# Patient Record
Sex: MALE | Race: BLACK OR AFRICAN AMERICAN | NOT HISPANIC OR LATINO | Employment: FULL TIME | URBAN - METROPOLITAN AREA
[De-identification: names, ages, dates, MRNs, and addresses within clinical notes are randomized per-mention and may not be internally consistent; named-entity substitution may affect disease eponyms.]

---

## 2023-04-14 DIAGNOSIS — D64.9 MILD ANEMIA: Primary | ICD-10-CM

## 2023-04-14 DIAGNOSIS — I10 BENIGN ESSENTIAL HYPERTENSION: ICD-10-CM

## 2023-04-14 PROBLEM — G43.909 MIGRAINES: Status: ACTIVE | Noted: 2023-04-14

## 2023-04-14 PROBLEM — R09.81 CHRONIC NASAL CONGESTION: Status: ACTIVE | Noted: 2023-04-14

## 2023-04-14 PROBLEM — D57.3 SICKLE CELL TRAIT (CMS-HCC): Status: ACTIVE | Noted: 2023-04-14

## 2023-04-14 PROBLEM — J30.2 SEASONAL ALLERGIES: Status: ACTIVE | Noted: 2023-04-14

## 2023-04-14 RX ORDER — FLUTICASONE PROPIONATE 50 MCG
2 SPRAY, SUSPENSION (ML) NASAL DAILY
COMMUNITY
Start: 2021-05-07 | End: 2023-04-17 | Stop reason: SDUPTHER

## 2023-04-14 RX ORDER — CETIRIZINE HYDROCHLORIDE 10 MG/1
1 TABLET ORAL DAILY
COMMUNITY
Start: 2021-06-11 | End: 2023-06-12 | Stop reason: WASHOUT

## 2023-04-14 RX ORDER — AMLODIPINE BESYLATE 10 MG/1
1 TABLET ORAL DAILY
COMMUNITY
Start: 2021-05-07 | End: 2023-04-17 | Stop reason: SDUPTHER

## 2023-04-16 PROBLEM — Z00.00 ANNUAL PHYSICAL EXAM: Status: ACTIVE | Noted: 2023-04-16

## 2023-04-16 NOTE — ASSESSMENT & PLAN NOTE
Start chlorthalidone as directed - Risks, benefits and side effects reviewed with patient.  Check BMP 3-4 weeks  Continue current dose amlodipine  Continue to monitor - call if consistently >140/90  Recheck 1 month

## 2023-04-16 NOTE — ASSESSMENT & PLAN NOTE
Yearly physical done.   Has received both initial COVID vaccines and at least one booster. Bivalent vaccine recommended.  Boostrix recommended   Labs pending

## 2023-04-16 NOTE — PROGRESS NOTES
"Subjective   Patient ID: Dena Rivero is a 22 y.o. male who presents for yearly physical.    HPI  Patient presents today for Annual Physical.    Has been taking BP med as directed - does not check BP at home.    Has had increased anxiety especially when going into public places since the pandemic. Denies depression - denies suicidal or homicidal ideation..     Also would like to start PreP. Is sexually active with male partners. No known diagnosis of HIV - has not had an HIV test in past.    Patient otherwise feels well. No other complaints or concerns.    The patient's relevant past medical, surgical, family, and social history was reviewed in [employer].  All pertinent lab work and results for this visit were reviewed with patient - labs from this AM are pending.      Review of Systems   A complete review of systems was performed and all systems were normal except what is noted in the HPI.        Objective   /83   Pulse 105   Temp 36.6 °C (97.9 °F)   Ht 1.956 m (6' 5\")   Wt (!) 172 kg (379 lb 9.6 oz)   SpO2 96%   BMI 45.01 kg/m²    Physical Exam  Constitutional:       General: He is not in acute distress.     Appearance: Normal appearance. He is obese.   HENT:      Head: Normocephalic and atraumatic.   Cardiovascular:      Rate and Rhythm: Normal rate and regular rhythm.      Heart sounds: Normal heart sounds.   Pulmonary:      Effort: Pulmonary effort is normal.      Breath sounds: Normal breath sounds.   Abdominal:      General: Abdomen is flat. Bowel sounds are normal.      Palpations: Abdomen is soft.      Tenderness: There is no abdominal tenderness.   Musculoskeletal:      Right lower leg: No edema.      Left lower leg: No edema.   Neurological:      Mental Status: He is alert.   Psychiatric:         Mood and Affect: Mood normal.         Behavior: Behavior normal.         Thought Content: Thought content normal.         Health Maintenance Due   Topic Date Due    HIV Screening  Never done    " Varicella Vaccines (2 of 2 - 2-dose childhood series) 01/03/2014    Hepatitis C Screening  Never done    COVID-19 Vaccine (3 - Booster for Moderna series) 07/18/2021    DTaP/Tdap/Td Vaccines (6 - Td or Tdap) 12/22/2021        Assessment/Plan   Problem List Items Addressed This Visit       Benign essential hypertension     Start chlorthalidone as directed - Risks, benefits and side effects reviewed with patient.  Check BMP 3-4 weeks  Continue current dose amlodipine  Continue to monitor - call if consistently >140/90  Recheck 1 month           Relevant Medications    amLODIPine (Norvasc) 10 mg tablet    chlorthalidone (Hygroton) 25 mg tablet    Other Relevant Orders    Basic metabolic panel    Mild anemia     Labs pending         Seasonal allergies    Relevant Medications    fluticasone (Flonase) 50 mcg/actuation nasal spray    Sickle cell trait (CMS/HCC)     No issues         Annual physical exam - Primary     Yearly physical done.   Has received both initial COVID vaccines and at least one booster. Bivalent vaccine recommended.  Boostrix recommended   Labs pending           At risk for HIV due to homosexual contact     HIV, Hep C, Hep B and syphilis testing next labs  Will discuss PreP further at next office visit   Other baseline labs (renal function) are pending  Urine for Chlamydia and gonorrhea today   No prior history of STI         Relevant Orders    HIV 1/2 antibodies, rapid    Hepatitis C Antibody    Hepatitis B surface antigen    C. Trachomatis / N. Gonorrhoeae, Amplified Detection    Hepatitis B surface antibody     Other Visit Diagnoses       Encounter for hepatitis C screening test for low risk patient        Relevant Orders    Hepatitis C Antibody    Generalized anxiety disorder        Relevant Medications    busPIRone (Buspar) 15 mg tablet              Patient understands and agrees with care plan.           Katerine Osborne MD

## 2023-04-17 ENCOUNTER — OFFICE VISIT (OUTPATIENT)
Dept: PRIMARY CARE | Facility: CLINIC | Age: 23
End: 2023-04-17
Payer: COMMERCIAL

## 2023-04-17 ENCOUNTER — LAB (OUTPATIENT)
Dept: LAB | Facility: LAB | Age: 23
End: 2023-04-17
Payer: COMMERCIAL

## 2023-04-17 VITALS
HEIGHT: 77 IN | HEART RATE: 105 BPM | SYSTOLIC BLOOD PRESSURE: 159 MMHG | DIASTOLIC BLOOD PRESSURE: 83 MMHG | BODY MASS INDEX: 37.19 KG/M2 | OXYGEN SATURATION: 96 % | WEIGHT: 315 LBS | TEMPERATURE: 97.9 F

## 2023-04-17 DIAGNOSIS — I10 BENIGN ESSENTIAL HYPERTENSION: ICD-10-CM

## 2023-04-17 DIAGNOSIS — D64.9 MILD ANEMIA: ICD-10-CM

## 2023-04-17 DIAGNOSIS — F41.1 GENERALIZED ANXIETY DISORDER: ICD-10-CM

## 2023-04-17 DIAGNOSIS — J30.2 SEASONAL ALLERGIES: ICD-10-CM

## 2023-04-17 DIAGNOSIS — D57.3 SICKLE CELL TRAIT (CMS-HCC): ICD-10-CM

## 2023-04-17 DIAGNOSIS — Z00.00 ANNUAL PHYSICAL EXAM: Primary | ICD-10-CM

## 2023-04-17 DIAGNOSIS — Z91.89 AT RISK FOR HIV DUE TO HOMOSEXUAL CONTACT: ICD-10-CM

## 2023-04-17 DIAGNOSIS — Z11.59 ENCOUNTER FOR HEPATITIS C SCREENING TEST FOR LOW RISK PATIENT: ICD-10-CM

## 2023-04-17 LAB
ALANINE AMINOTRANSFERASE (SGPT) (U/L) IN SER/PLAS: 23 U/L (ref 10–52)
ALBUMIN (G/DL) IN SER/PLAS: 4.1 G/DL (ref 3.4–5)
ALKALINE PHOSPHATASE (U/L) IN SER/PLAS: 83 U/L (ref 33–120)
ANION GAP IN SER/PLAS: 12 MMOL/L (ref 10–20)
ASPARTATE AMINOTRANSFERASE (SGOT) (U/L) IN SER/PLAS: 20 U/L (ref 9–39)
BILIRUBIN TOTAL (MG/DL) IN SER/PLAS: 0.3 MG/DL (ref 0–1.2)
CALCIUM (MG/DL) IN SER/PLAS: 8.9 MG/DL (ref 8.6–10.3)
CARBON DIOXIDE, TOTAL (MMOL/L) IN SER/PLAS: 28 MMOL/L (ref 21–32)
CHLORIDE (MMOL/L) IN SER/PLAS: 104 MMOL/L (ref 98–107)
CHOLESTEROL (MG/DL) IN SER/PLAS: 172 MG/DL (ref 0–199)
CHOLESTEROL IN HDL (MG/DL) IN SER/PLAS: 44.9 MG/DL
CHOLESTEROL IN LDL (MG/DL) IN SER/PLAS BY DIRECT ASSAY: 128 MG/DL (ref 0–129)
CHOLESTEROL/HDL RATIO: 3.8
COBALAMIN (VITAMIN B12) (PG/ML) IN SER/PLAS: 210 PG/ML (ref 211–911)
CREATININE (MG/DL) IN SER/PLAS: 0.89 MG/DL (ref 0.5–1.3)
ERYTHROCYTE DISTRIBUTION WIDTH (RATIO) BY AUTOMATED COUNT: 12.4 % (ref 11.5–14.5)
ERYTHROCYTE MEAN CORPUSCULAR HEMOGLOBIN CONCENTRATION (G/DL) BY AUTOMATED: 31.4 G/DL (ref 32–36)
ERYTHROCYTE MEAN CORPUSCULAR VOLUME (FL) BY AUTOMATED COUNT: 85 FL (ref 80–100)
ERYTHROCYTES (10*6/UL) IN BLOOD BY AUTOMATED COUNT: 5.37 X10E12/L (ref 4.5–5.9)
FERRITIN (UG/LL) IN SER/PLAS: 55 UG/L (ref 20–300)
FOLATE (NG/ML) IN SER/PLAS: 5.6 NG/ML
GFR MALE: >90 ML/MIN/1.73M2
GLUCOSE (MG/DL) IN SER/PLAS: 95 MG/DL (ref 74–99)
HEMATOCRIT (%) IN BLOOD BY AUTOMATED COUNT: 45.6 % (ref 41–52)
HEMOGLOBIN (G/DL) IN BLOOD: 14.3 G/DL (ref 13.5–17.5)
IRON (UG/DL) IN SER/PLAS: 55 UG/DL (ref 35–150)
IRON BINDING CAPACITY (UG/DL) IN SER/PLAS: 309 UG/DL (ref 240–445)
IRON SATURATION (%) IN SER/PLAS: 18 % (ref 25–45)
LDL: 115 MG/DL (ref 0–119)
LEUKOCYTES (10*3/UL) IN BLOOD BY AUTOMATED COUNT: 7.2 X10E9/L (ref 4.4–11.3)
NON HDL CHOLESTEROL: 127 MG/DL (ref 0–149)
PLATELETS (10*3/UL) IN BLOOD AUTOMATED COUNT: 260 X10E9/L (ref 150–450)
POTASSIUM (MMOL/L) IN SER/PLAS: 4.7 MMOL/L (ref 3.5–5.3)
PROTEIN TOTAL: 7.7 G/DL (ref 6.4–8.2)
SODIUM (MMOL/L) IN SER/PLAS: 139 MMOL/L (ref 136–145)
THYROTROPIN (MIU/L) IN SER/PLAS BY DETECTION LIMIT <= 0.05 MIU/L: 2.2 MIU/L (ref 0.44–3.98)
TRIGLYCERIDE (MG/DL) IN SER/PLAS: 61 MG/DL (ref 0–149)
UREA NITROGEN (MG/DL) IN SER/PLAS: 12 MG/DL (ref 6–23)
VLDL: 12 MG/DL (ref 0–40)

## 2023-04-17 PROCEDURE — 82607 VITAMIN B-12: CPT

## 2023-04-17 PROCEDURE — 1036F TOBACCO NON-USER: CPT | Performed by: FAMILY MEDICINE

## 2023-04-17 PROCEDURE — 85027 COMPLETE CBC AUTOMATED: CPT

## 2023-04-17 PROCEDURE — 82746 ASSAY OF FOLIC ACID SERUM: CPT

## 2023-04-17 PROCEDURE — 99214 OFFICE O/P EST MOD 30 MIN: CPT | Performed by: FAMILY MEDICINE

## 2023-04-17 PROCEDURE — 84443 ASSAY THYROID STIM HORMONE: CPT

## 2023-04-17 PROCEDURE — 87491 CHLMYD TRACH DNA AMP PROBE: CPT

## 2023-04-17 PROCEDURE — 99395 PREV VISIT EST AGE 18-39: CPT | Performed by: FAMILY MEDICINE

## 2023-04-17 PROCEDURE — 87591 N.GONORRHOEAE DNA AMP PROB: CPT

## 2023-04-17 PROCEDURE — 3008F BODY MASS INDEX DOCD: CPT | Performed by: FAMILY MEDICINE

## 2023-04-17 PROCEDURE — 3079F DIAST BP 80-89 MM HG: CPT | Performed by: FAMILY MEDICINE

## 2023-04-17 PROCEDURE — 83540 ASSAY OF IRON: CPT

## 2023-04-17 PROCEDURE — 80053 COMPREHEN METABOLIC PANEL: CPT

## 2023-04-17 PROCEDURE — 80061 LIPID PANEL: CPT

## 2023-04-17 PROCEDURE — 83721 ASSAY OF BLOOD LIPOPROTEIN: CPT

## 2023-04-17 PROCEDURE — 3077F SYST BP >= 140 MM HG: CPT | Performed by: FAMILY MEDICINE

## 2023-04-17 PROCEDURE — 83550 IRON BINDING TEST: CPT

## 2023-04-17 PROCEDURE — 82728 ASSAY OF FERRITIN: CPT

## 2023-04-17 PROCEDURE — 36415 COLL VENOUS BLD VENIPUNCTURE: CPT

## 2023-04-17 RX ORDER — BUSPIRONE HYDROCHLORIDE 15 MG/1
7.5 TABLET ORAL 2 TIMES DAILY
Qty: 30 TABLET | Refills: 11 | Status: SHIPPED | OUTPATIENT
Start: 2023-04-17 | End: 2023-06-12 | Stop reason: SDUPTHER

## 2023-04-17 RX ORDER — FLUTICASONE PROPIONATE 50 MCG
2 SPRAY, SUSPENSION (ML) NASAL DAILY
Qty: 16 G | Refills: 2 | Status: SHIPPED | OUTPATIENT
Start: 2023-04-17

## 2023-04-17 RX ORDER — AMLODIPINE BESYLATE 10 MG/1
10 TABLET ORAL DAILY
Qty: 30 TABLET | Refills: 3 | Status: SHIPPED | OUTPATIENT
Start: 2023-04-17 | End: 2024-02-02 | Stop reason: SDUPTHER

## 2023-04-17 RX ORDER — CHLORTHALIDONE 25 MG/1
25 TABLET ORAL DAILY
Qty: 30 TABLET | Refills: 11 | Status: SHIPPED | OUTPATIENT
Start: 2023-04-17 | End: 2024-02-02 | Stop reason: SDUPTHER

## 2023-04-17 ASSESSMENT — PATIENT HEALTH QUESTIONNAIRE - PHQ9
SUM OF ALL RESPONSES TO PHQ9 QUESTIONS 1 AND 2: 0
1. LITTLE INTEREST OR PLEASURE IN DOING THINGS: NOT AT ALL
2. FEELING DOWN, DEPRESSED OR HOPELESS: NOT AT ALL

## 2023-04-17 NOTE — ASSESSMENT & PLAN NOTE
HIV, Hep C, Hep B and syphilis testing next labs  Will discuss PreP further at next office visit   Other baseline labs (renal function) are pending  Urine for Chlamydia and gonorrhea today   No prior history of STI

## 2023-04-17 NOTE — PATIENT INSTRUCTIONS
I would like you to follow up in 1 months  Please have all labs that were ordered done at least 1 week prior to your visit.    Continue current medication for hypertension. Continue to monitor blood pressure.  Call if blood pressure consistently >140/90.  Low salt diet recommended.  Increase physical activity as able.  Reevaluate in 1 months.

## 2023-04-18 LAB
CHLAMYDIA TRACH., AMPLIFIED: NEGATIVE
N. GONORRHEA, AMPLIFIED: NEGATIVE

## 2023-04-18 RX ORDER — MULTIVITAMIN/IRON/FOLIC ACID 18MG-0.4MG
1 TABLET ORAL DAILY
COMMUNITY
End: 2023-06-12 | Stop reason: WASHOUT

## 2023-04-18 NOTE — TELEPHONE ENCOUNTER
----- Message from Katerine Osborne MD sent at 4/18/2023  9:26 AM EDT -----  Please notify patient B vitamins are low  Recommd OTC B complex please put in chart  Must keep office visit - will give b12 injection at that visit

## 2023-06-02 PROBLEM — E53.8 B12 DEFICIENCY: Status: ACTIVE | Noted: 2023-06-02

## 2023-06-02 PROBLEM — F41.1 GENERALIZED ANXIETY DISORDER: Status: ACTIVE | Noted: 2023-06-02

## 2023-06-05 ENCOUNTER — APPOINTMENT (OUTPATIENT)
Dept: PRIMARY CARE | Facility: CLINIC | Age: 23
End: 2023-06-05

## 2023-06-10 ENCOUNTER — LAB (OUTPATIENT)
Dept: LAB | Facility: LAB | Age: 23
End: 2023-06-10
Payer: COMMERCIAL

## 2023-06-10 DIAGNOSIS — Z11.59 ENCOUNTER FOR HEPATITIS C SCREENING TEST FOR LOW RISK PATIENT: ICD-10-CM

## 2023-06-10 DIAGNOSIS — Z91.89 AT RISK FOR HIV DUE TO HOMOSEXUAL CONTACT: ICD-10-CM

## 2023-06-10 DIAGNOSIS — E53.8 B12 DEFICIENCY: ICD-10-CM

## 2023-06-10 DIAGNOSIS — I10 BENIGN ESSENTIAL HYPERTENSION: ICD-10-CM

## 2023-06-10 LAB
ANION GAP IN SER/PLAS: 15 MMOL/L (ref 10–20)
CALCIUM (MG/DL) IN SER/PLAS: 9.8 MG/DL (ref 8.6–10.3)
CARBON DIOXIDE, TOTAL (MMOL/L) IN SER/PLAS: 24 MMOL/L (ref 21–32)
CHLORIDE (MMOL/L) IN SER/PLAS: 104 MMOL/L (ref 98–107)
COBALAMIN (VITAMIN B12) (PG/ML) IN SER/PLAS: 231 PG/ML (ref 211–911)
CREATININE (MG/DL) IN SER/PLAS: 0.9 MG/DL (ref 0.5–1.3)
GFR MALE: >90 ML/MIN/1.73M2
GLUCOSE (MG/DL) IN SER/PLAS: 83 MG/DL (ref 74–99)
HEPATITIS B VIRUS SURFACE AB (MIU/ML) IN SERUM: <3.1 MIU/ML
HEPATITIS B VIRUS SURFACE AG PRESENCE IN SERUM: NONREACTIVE
HEPATITIS C VIRUS AB PRESENCE IN SERUM: NONREACTIVE
HIV 1/ 2 AG/AB SCREEN: NONREACTIVE
POTASSIUM (MMOL/L) IN SER/PLAS: 4.1 MMOL/L (ref 3.5–5.3)
SODIUM (MMOL/L) IN SER/PLAS: 139 MMOL/L (ref 136–145)
UREA NITROGEN (MG/DL) IN SER/PLAS: 8 MG/DL (ref 6–23)

## 2023-06-10 PROCEDURE — 82607 VITAMIN B-12: CPT

## 2023-06-10 PROCEDURE — 87340 HEPATITIS B SURFACE AG IA: CPT

## 2023-06-10 PROCEDURE — 87389 HIV-1 AG W/HIV-1&-2 AB AG IA: CPT

## 2023-06-10 PROCEDURE — 36415 COLL VENOUS BLD VENIPUNCTURE: CPT

## 2023-06-10 PROCEDURE — 86803 HEPATITIS C AB TEST: CPT

## 2023-06-10 PROCEDURE — 80048 BASIC METABOLIC PNL TOTAL CA: CPT

## 2023-06-10 PROCEDURE — 86706 HEP B SURFACE ANTIBODY: CPT

## 2023-06-12 ENCOUNTER — OFFICE VISIT (OUTPATIENT)
Dept: PRIMARY CARE | Facility: CLINIC | Age: 23
End: 2023-06-12
Payer: COMMERCIAL

## 2023-06-12 VITALS
HEIGHT: 77 IN | BODY MASS INDEX: 37.19 KG/M2 | HEART RATE: 88 BPM | TEMPERATURE: 97.6 F | WEIGHT: 315 LBS | DIASTOLIC BLOOD PRESSURE: 94 MMHG | OXYGEN SATURATION: 95 % | SYSTOLIC BLOOD PRESSURE: 147 MMHG

## 2023-06-12 DIAGNOSIS — Z91.89 AT RISK FOR HIV DUE TO HOMOSEXUAL CONTACT: ICD-10-CM

## 2023-06-12 DIAGNOSIS — J01.10 ACUTE NON-RECURRENT FRONTAL SINUSITIS: ICD-10-CM

## 2023-06-12 DIAGNOSIS — F41.1 GENERALIZED ANXIETY DISORDER: ICD-10-CM

## 2023-06-12 DIAGNOSIS — E53.8 B12 DEFICIENCY: Primary | ICD-10-CM

## 2023-06-12 DIAGNOSIS — I10 BENIGN ESSENTIAL HYPERTENSION: ICD-10-CM

## 2023-06-12 DIAGNOSIS — E66.01 MORBID OBESITY WITH BODY MASS INDEX (BMI) OF 40.0 TO 44.9 IN ADULT (MULTI): ICD-10-CM

## 2023-06-12 PROBLEM — D64.9 MILD ANEMIA: Status: RESOLVED | Noted: 2023-04-14 | Resolved: 2023-06-12

## 2023-06-12 PROCEDURE — 3077F SYST BP >= 140 MM HG: CPT | Performed by: FAMILY MEDICINE

## 2023-06-12 PROCEDURE — 96372 THER/PROPH/DIAG INJ SC/IM: CPT | Performed by: FAMILY MEDICINE

## 2023-06-12 PROCEDURE — 1036F TOBACCO NON-USER: CPT | Performed by: FAMILY MEDICINE

## 2023-06-12 PROCEDURE — 3008F BODY MASS INDEX DOCD: CPT | Performed by: FAMILY MEDICINE

## 2023-06-12 PROCEDURE — 99214 OFFICE O/P EST MOD 30 MIN: CPT | Performed by: FAMILY MEDICINE

## 2023-06-12 PROCEDURE — 3080F DIAST BP >= 90 MM HG: CPT | Performed by: FAMILY MEDICINE

## 2023-06-12 RX ORDER — CYANOCOBALAMIN 1000 UG/ML
1000 INJECTION, SOLUTION INTRAMUSCULAR; SUBCUTANEOUS ONCE
Status: COMPLETED | OUTPATIENT
Start: 2023-06-12 | End: 2023-06-12

## 2023-06-12 RX ORDER — METHYLPREDNISOLONE 4 MG/1
TABLET ORAL
Qty: 21 TABLET | Refills: 0 | Status: SHIPPED | OUTPATIENT
Start: 2023-06-12 | End: 2023-06-19

## 2023-06-12 RX ORDER — METFORMIN HYDROCHLORIDE 500 MG/1
500 TABLET ORAL
Qty: 30 TABLET | Refills: 11 | Status: SHIPPED | OUTPATIENT
Start: 2023-06-12 | End: 2024-02-02 | Stop reason: SDUPTHER

## 2023-06-12 RX ORDER — LANOLIN ALCOHOL/MO/W.PET/CERES
1000 CREAM (GRAM) TOPICAL DAILY
Qty: 30 TABLET | Refills: 11
Start: 2023-06-12 | End: 2023-12-08 | Stop reason: WASHOUT

## 2023-06-12 RX ORDER — LOSARTAN POTASSIUM 50 MG/1
50 TABLET ORAL DAILY
Qty: 30 TABLET | Refills: 11 | Status: SHIPPED | OUTPATIENT
Start: 2023-06-12 | End: 2024-02-02 | Stop reason: DRUGHIGH

## 2023-06-12 RX ORDER — BUSPIRONE HYDROCHLORIDE 15 MG/1
15 TABLET ORAL 2 TIMES DAILY
Qty: 60 TABLET | Refills: 11 | Status: SHIPPED | OUTPATIENT
Start: 2023-06-12 | End: 2024-02-02 | Stop reason: SDUPTHER

## 2023-06-12 RX ORDER — DOXYCYCLINE 100 MG/1
100 TABLET ORAL 2 TIMES DAILY
Qty: 20 TABLET | Refills: 0 | Status: SHIPPED | OUTPATIENT
Start: 2023-06-12 | End: 2023-06-22

## 2023-06-12 RX ADMIN — CYANOCOBALAMIN 1000 MCG: 1000 INJECTION, SOLUTION INTRAMUSCULAR; SUBCUTANEOUS at 12:22

## 2023-06-12 ASSESSMENT — PATIENT HEALTH QUESTIONNAIRE - PHQ9
1. LITTLE INTEREST OR PLEASURE IN DOING THINGS: NOT AT ALL
SUM OF ALL RESPONSES TO PHQ9 QUESTIONS 1 AND 2: 0
2. FEELING DOWN, DEPRESSED OR HOPELESS: NOT AT ALL

## 2023-06-12 NOTE — PROGRESS NOTES
Subjective   Patient ID: Dena Rivero is a 22 y.o. male who presents for Follow-up.      HPI  Patient presents today for follow up labs and chronic conditions.  Anxiety some improved on buspar - still some shakiness. No significant side effects - denies suicidal or homicidal ideation.   Tolerating BP meds no side effects.  Would like to start PreP    Patient otherwise feels well. No other complaints or concerns.    The patient's relevant past medical, surgical, family, and social history was reviewed in [employer].  All pertinent lab work and results for this visit were reviewed with patient.    Recent Results (from the past 1344 hour(s))   C. Trachomatis / N. Gonorrhoeae, Amplified Detection    Collection Time: 04/17/23  3:16 PM   Result Value Ref Range    Neisseria gonorrhea,Amplified NEGATIVE Negative    Chlamydia trachomatis, Amplified NEGATIVE Negative   HIV 1/2 antibodies, rapid    Collection Time: 06/10/23  9:25 AM   Result Value Ref Range    HIV 1 and 2 Screen NONREACTIVE NONREACTIVE   Hepatitis C Antibody    Collection Time: 06/10/23  9:25 AM   Result Value Ref Range    Hepatitis C Ab NONREACTIVE NONREACTIVE   Hepatitis B surface antigen    Collection Time: 06/10/23  9:25 AM   Result Value Ref Range    Hepatitis B Surface Ag NONREACTIVE NONREACTIVE   Hepatitis B surface antibody    Collection Time: 06/10/23  9:25 AM   Result Value Ref Range    Hepatitis B Surface Ab <3.1 <10 mIU/mL   Basic metabolic panel    Collection Time: 06/10/23  9:25 AM   Result Value Ref Range    Glucose 83 74 - 99 mg/dL    Sodium 139 136 - 145 mmol/L    Potassium 4.1 3.5 - 5.3 mmol/L    Chloride 104 98 - 107 mmol/L    Bicarbonate 24 21 - 32 mmol/L    Anion Gap 15 10 - 20 mmol/L    Urea Nitrogen 8 6 - 23 mg/dL    Creatinine 0.90 0.50 - 1.30 mg/dL    GFR MALE >90 >90 mL/min/1.73m2    Calcium 9.8 8.6 - 10.3 mg/dL   Vitamin B12    Collection Time: 06/10/23  9:25 AM   Result Value Ref Range    Vitamin B-12 231 211 - 911 pg/mL  "          Review of Systems   A complete review of systems was performed and all systems were normal except what is noted in the HPI.        Objective   BP (!) 147/94   Pulse 88   Temp 36.4 °C (97.6 °F)   Ht 1.956 m (6' 5\")   Wt (!) 165 kg (364 lb 3.2 oz)   SpO2 95%   BMI 43.19 kg/m²    Physical Exam  Constitutional:       General: He is not in acute distress.     Appearance: Normal appearance. He is obese.   HENT:      Head: Normocephalic and atraumatic.   Cardiovascular:      Rate and Rhythm: Normal rate and regular rhythm.      Heart sounds: Normal heart sounds.   Pulmonary:      Effort: Pulmonary effort is normal.      Breath sounds: Normal breath sounds.   Abdominal:      General: Abdomen is flat. Bowel sounds are normal.      Palpations: Abdomen is soft.      Tenderness: There is no abdominal tenderness.   Musculoskeletal:      Right lower leg: No edema.      Left lower leg: No edema.   Neurological:      Mental Status: He is alert.   Psychiatric:         Mood and Affect: Mood normal.         Behavior: Behavior normal.         Thought Content: Thought content normal.         Health Maintenance Due   Topic Date Due    Varicella Vaccines (2 of 2 - 2-dose childhood series) 01/03/2014    COVID-19 Vaccine (3 - Booster for Moderna series) 07/18/2021    DTaP/Tdap/Td Vaccines (6 - Td or Tdap) 12/22/2021        Assessment/Plan   Problem List Items Addressed This Visit       Benign essential hypertension - Primary     Start losartan as directed Risks, benefits and side effects reviewed with patient.   otherwise continue current medication   Reevaluate in 3 months.           Relevant Medications    losartan (Cozaar) 50 mg tablet    At risk for HIV due to homosexual contact     Screening labs within normal limits   Will refer to PreP clinic  Safe sex counseling done         Generalized anxiety disorder     Increase buspar to 15 mg BID Risks, benefits and side effects reviewed with patient.   Call for worsening " depression or anxiety, suicidal or homicidal ideation.   Reevaluate in 3 months.           Relevant Medications    busPIRone (Buspar) 15 mg tablet    B12 deficiency     B12 injection today  Start OTC b12 as directed   Reevaluate in 3 months.           Relevant Medications    cyanocobalamin (Vitamin B-12) 1,000 mcg tablet    Other Relevant Orders    Vitamin B12    Morbid obesity with body mass index (BMI) of 40.0 to 44.9 in adult (CMS/[facility])     Start metformin as directed - Risks, benefits and side effects reviewed with patient.   Work on diet reviewed with patient.   Recommend at least 150 minutes of cardiovascular exercise weekly.   Reevaluate in 3 months.           Relevant Medications    metFORMIN (Glucophage) 500 mg tablet     Other Visit Diagnoses       Acute non-recurrent frontal sinusitis        start medrol dose pack and doxcycline as directed - risks/benefits/side effects reviewed with patient  increase fluids, rest  call if worsens or persists    Relevant Medications    methylPREDNISolone (Medrol Dospak) 4 mg tablets    doxycycline (Adoxa) 100 mg tablet              Patient understands and agrees with care plan.           Katerine Osborne MD

## 2023-06-12 NOTE — LETTER
June 12, 2023     Patient: Dena Rivero   YOB: 2000   Date of Visit: 6/12/2023       To Whom It May Concern:    Dena Rivero was seen in my clinic on 6/12/2023 at 11:40 am. Please excuse Dena for his absence from work on this day to make the appointment.    If you have any questions or concerns, please don't hesitate to call.         Sincerely,         Katerine Osborne MD        CC: No Recipients

## 2023-06-12 NOTE — PATIENT INSTRUCTIONS
I would like you to follow up in 3 months  Please have all labs that were ordered done at least 1 week prior to your visit.    Continue current medication for hypertension. Continue to monitor blood pressure.  Call if blood pressure consistently >140/90.  Low salt diet recommended.  Increase physical activity as able.  Reevaluate in 3 months.     Recommend healthy diet based around fruits, vegetables, and lean proteins such as chicken, turkey, fish, and beans.  Also include moderate portions of healthy carbohydrates such as wheat bread and pasta, sweet potatoes. Limit sweets and alcoholic beverages. Try not drink more than 100 calories in beverages daily.   It is important to get a protein-rich breakfast daily such as oatmeal, eggs or Greek yogurt.  Increase activity as able to a recommended goal of at least 30 minutes of cardiovascular exercise (walking, swimming, biking, jogging etc.) at least 5 days weekly and a goal of 45 minutes or more most days of the week for weight loss. This exercise can be done all at one time or broken up into 2 or more sessions throughout the day.

## 2023-06-12 NOTE — ASSESSMENT & PLAN NOTE
Start metformin as directed - Risks, benefits and side effects reviewed with patient.   Work on diet reviewed with patient.   Recommend at least 150 minutes of cardiovascular exercise weekly.   Reevaluate in 3 months.

## 2023-06-12 NOTE — ASSESSMENT & PLAN NOTE
Increase buspar to 15 mg BID Risks, benefits and side effects reviewed with patient.   Call for worsening depression or anxiety, suicidal or homicidal ideation.   Reevaluate in 3 months.

## 2023-06-12 NOTE — ASSESSMENT & PLAN NOTE
Start losartan as directed Risks, benefits and side effects reviewed with patient.   otherwise continue current medication   Reevaluate in 3 months.

## 2023-08-28 ENCOUNTER — APPOINTMENT (OUTPATIENT)
Dept: PRIMARY CARE | Facility: CLINIC | Age: 23
End: 2023-08-28

## 2023-09-22 ENCOUNTER — APPOINTMENT (OUTPATIENT)
Dept: PRIMARY CARE | Facility: CLINIC | Age: 23
End: 2023-09-22

## 2023-11-03 ENCOUNTER — APPOINTMENT (OUTPATIENT)
Dept: PRIMARY CARE | Facility: CLINIC | Age: 23
End: 2023-11-03

## 2023-12-04 ENCOUNTER — TELEPHONE (OUTPATIENT)
Dept: PRIMARY CARE | Facility: CLINIC | Age: 23
End: 2023-12-04

## 2023-12-04 NOTE — TELEPHONE ENCOUNTER
Patient called in with a compliant of a stomach virus that has lasted about 10 days. He said he has had stomach pain, nausea, vomiting. He also said he has had a sore throat and neck pain, and that the neck pain migrates down through his right arm. Urgent Care was heavily advised if anything worsens. Please advise.

## 2023-12-06 ASSESSMENT — ENCOUNTER SYMPTOMS
HOARSE VOICE: 0
STRIDOR: 0
VOMITING: 0
COUGH: 1
NECK PAIN: 1
TROUBLE SWALLOWING: 1
ABDOMINAL PAIN: 1
SORE THROAT: 1
DIARRHEA: 1
HEADACHES: 1
SWOLLEN GLANDS: 1
SHORTNESS OF BREATH: 0

## 2023-12-07 PROBLEM — G47.30 SLEEP APNEA: Status: ACTIVE | Noted: 2023-12-07

## 2023-12-07 PROBLEM — J03.90 TONSILLITIS: Status: ACTIVE | Noted: 2023-12-07

## 2023-12-07 RX ORDER — AMOXICILLIN 500 MG/1
500 CAPSULE ORAL 2 TIMES DAILY
COMMUNITY
Start: 2023-12-04 | End: 2024-01-31 | Stop reason: ALTCHOICE

## 2023-12-07 NOTE — PROGRESS NOTES
Subjective   Patient ID: Dena Rivero is a 23 y.o. male who presents for Follow-up.  Sore Throat   This is a chronic problem. The current episode started 1 to 4 weeks ago. The problem has been gradually worsening. The pain is worse on the right side. The maximum temperature recorded prior to his arrival was more than 104 F. The fever has been present for 1 to 2 days. The pain is at a severity of 9/10. Associated symptoms include abdominal pain, coughing, diarrhea, headaches, neck pain, swollen glands and trouble swallowing. Pertinent negatives include no congestion, drooling, ear discharge, ear pain, hoarse voice, plugged ear sensation, shortness of breath, stridor or vomiting. He has had no exposure to strep or mono.     Patient presents today for ER follow up. Seen [facility] ER 12/4 with complaint of fever, sore throat and abdominal pain. ER work up showed mild leukocysosis. CT neck showed tonsillitis without abscess. CT abdomen benign. Testing for COVID, flu, mono and strep were negative. Discharged on amoxil. Returned to ER 12/6 with persistent symptoms. Chlamydia testing done which was negative. Treated with IV fluids, decadron, zosyn and zithromax. Noted to be mildly hypoxic while sleeping - ER recommended testing for [place].  Since 12/6 patient not much better. Feels ok for about 12 hours after seen in ER and gets IV fluids and antibiotic but then throat hirts as much as it did before. Having trouble with po intake due to pain. Stomach pain has resolved. Still having fevers off and on.    Otherwise feels well. No other complaints or concerns.    The patient's relevant past medical, surgical, family and social history was reviewed in [employer].    Review of Systems   HENT:  Positive for sore throat and trouble swallowing. Negative for congestion, drooling, ear discharge, ear pain and hoarse voice.    Respiratory:  Positive for cough. Negative for shortness of breath and stridor.    Gastrointestinal:  Positive for  abdominal pain and diarrhea. Negative for vomiting.   Musculoskeletal:  Positive for neck pain.   Neurological:  Positive for headaches.     A complete review of systems was performed and all systems were normal except what is noted in the HPI.    Objective   Physical Exam  Constitutional:       General: He is not in acute distress.     Appearance: Normal appearance. He is obese.   HENT:      Head: Normocephalic and atraumatic.      Right Ear: Tympanic membrane and ear canal normal.      Left Ear: Tympanic membrane and ear canal normal.      Mouth/Throat:      Lips: Pink.      Mouth: Mucous membranes are moist.      Palate: No mass and lesions.      Pharynx: Posterior oropharyngeal erythema present. No pharyngeal swelling or uvula swelling.      Tonsils: Tonsillar exudate present. No tonsillar abscesses. 3+ on the right. 3+ on the left.   Cardiovascular:      Rate and Rhythm: Normal rate and regular rhythm.      Heart sounds: Normal heart sounds.   Pulmonary:      Effort: Pulmonary effort is normal.      Breath sounds: Normal breath sounds.   Abdominal:      General: Abdomen is flat. Bowel sounds are normal.      Palpations: Abdomen is soft.      Tenderness: There is no abdominal tenderness.   Musculoskeletal:      Right lower leg: No edema.      Left lower leg: No edema.   Neurological:      Mental Status: He is alert.   Psychiatric:         Mood and Affect: Mood normal.         Behavior: Behavior normal.         Thought Content: Thought content normal.             Assessment/Plan   Problem List Items Addressed This Visit       Benign essential hypertension     Well controlled. Continue current medication and recheck in 3 months.           Tonsillitis - Primary     S/P decadron x 2, zosyn and zithromax in ER  Strep/COVID/flu/mono and chlamydia testing all negative  CT ER negative for abscess  Throat culture today  Ceftriaxone 1 gm IM today  Kenalog 40 mg Im today  Stop amoxil  Start cefdinir and prednisone as  directed 12/9/23 - Risks, benefits and side effects reviewed with patient.   Start Magic Mouthwash as directed   Small sips fluids, ice chips frequently  Tylenol or motrin as directed as needed   Increase fluids, rest  call if worsens or persists   Recheck in 3-5 days         Relevant Medications    cefTRIAXone (Rocephin) vial 1 g (Start on 12/8/2023  1:45 PM)    triamcinolone acetonide (Kenalog-40) injection 40 mg (Start on 12/8/2023  1:45 PM)    cefdinir (Omnicef) 300 mg capsule    predniSONE (Deltasone) 20 mg tablet    magic mouthwash (lidocaine, diphenhydrAMINE, Maalox 1:1:1)    Other Relevant Orders    Tissue/Wound Culture/Smear    Sleep apnea     Will arrange for follow up testing once acute issues resolved             Patient and family understand and agree with treatment plan.         Katerine Osborne MD

## 2023-12-07 NOTE — PATIENT INSTRUCTIONS
I would like you to follow up in 3-5 days.  Please have all labs that were ordered done at least 1 week prior to your visit.

## 2023-12-07 NOTE — ASSESSMENT & PLAN NOTE
S/P decadron x 2, zosyn and zithromax in ER  Strep/COVID/flu/mono and chlamydia testing all negative  CT ER negative for abscess  Throat culture today  Ceftriaxone 1 gm IM today  Kenalog 40 mg Im today  Stop amoxil  Start cefdinir and prednisone as directed 12/9/23 - Risks, benefits and side effects reviewed with patient.   Start Magic Mouthwash as directed   Small sips fluids, ice chips frequently  Tylenol or motrin as directed as needed   Increase fluids, rest  call if worsens or persists   Recheck in 3-5 days

## 2023-12-08 ENCOUNTER — OFFICE VISIT (OUTPATIENT)
Dept: PRIMARY CARE | Facility: CLINIC | Age: 23
End: 2023-12-08
Payer: COMMERCIAL

## 2023-12-08 ENCOUNTER — TELEPHONE (OUTPATIENT)
Dept: PRIMARY CARE | Facility: CLINIC | Age: 23
End: 2023-12-08

## 2023-12-08 VITALS
HEART RATE: 104 BPM | DIASTOLIC BLOOD PRESSURE: 83 MMHG | HEIGHT: 77 IN | BODY MASS INDEX: 37.19 KG/M2 | TEMPERATURE: 97.8 F | WEIGHT: 315 LBS | OXYGEN SATURATION: 94 % | SYSTOLIC BLOOD PRESSURE: 138 MMHG

## 2023-12-08 DIAGNOSIS — G47.33 OBSTRUCTIVE SLEEP APNEA SYNDROME: ICD-10-CM

## 2023-12-08 DIAGNOSIS — I10 BENIGN ESSENTIAL HYPERTENSION: ICD-10-CM

## 2023-12-08 DIAGNOSIS — J03.90 TONSILLITIS: Primary | ICD-10-CM

## 2023-12-08 PROCEDURE — 3079F DIAST BP 80-89 MM HG: CPT | Performed by: FAMILY MEDICINE

## 2023-12-08 PROCEDURE — 87070 CULTURE OTHR SPECIMN AEROBIC: CPT

## 2023-12-08 PROCEDURE — 99213 OFFICE O/P EST LOW 20 MIN: CPT | Performed by: FAMILY MEDICINE

## 2023-12-08 PROCEDURE — 96372 THER/PROPH/DIAG INJ SC/IM: CPT | Performed by: FAMILY MEDICINE

## 2023-12-08 PROCEDURE — 1036F TOBACCO NON-USER: CPT | Performed by: FAMILY MEDICINE

## 2023-12-08 PROCEDURE — 3008F BODY MASS INDEX DOCD: CPT | Performed by: FAMILY MEDICINE

## 2023-12-08 PROCEDURE — 87075 CULTR BACTERIA EXCEPT BLOOD: CPT

## 2023-12-08 PROCEDURE — 3075F SYST BP GE 130 - 139MM HG: CPT | Performed by: FAMILY MEDICINE

## 2023-12-08 RX ORDER — CEFDINIR 300 MG/1
300 CAPSULE ORAL 2 TIMES DAILY
Qty: 20 CAPSULE | Refills: 0 | Status: SHIPPED | OUTPATIENT
Start: 2023-12-08 | End: 2024-01-31 | Stop reason: ALTCHOICE

## 2023-12-08 RX ORDER — PREDNISONE 20 MG/1
TABLET ORAL
Qty: 21 TABLET | Refills: 0 | Status: SHIPPED | OUTPATIENT
Start: 2023-12-08 | End: 2024-02-02 | Stop reason: ALTCHOICE

## 2023-12-08 RX ORDER — LIDOCAINE HYDROCHLORIDE 20 MG/ML
1.25 SOLUTION OROPHARYNGEAL EVERY 6 HOURS PRN
Qty: 100 ML | Refills: 0 | Status: SHIPPED | OUTPATIENT
Start: 2023-12-08 | End: 2023-12-28

## 2023-12-08 RX ORDER — DIPHENHYDRAMINE HCL 12.5MG/5ML
25 LIQUID (ML) ORAL EVERY 6 HOURS PRN
Qty: 118 ML | Refills: 0 | Status: SHIPPED | OUTPATIENT
Start: 2023-12-08 | End: 2024-02-02 | Stop reason: WASHOUT

## 2023-12-08 RX ORDER — TRIAMCINOLONE ACETONIDE 40 MG/ML
40 INJECTION, SUSPENSION INTRA-ARTICULAR; INTRAMUSCULAR ONCE
Status: COMPLETED | OUTPATIENT
Start: 2023-12-08 | End: 2023-12-08

## 2023-12-08 RX ORDER — CEFTRIAXONE 1 G/1
1 INJECTION, POWDER, FOR SOLUTION INTRAMUSCULAR; INTRAVENOUS ONCE
Status: COMPLETED | OUTPATIENT
Start: 2023-12-08 | End: 2023-12-08

## 2023-12-08 RX ADMIN — TRIAMCINOLONE ACETONIDE 40 MG: 40 INJECTION, SUSPENSION INTRA-ARTICULAR; INTRAMUSCULAR at 14:01

## 2023-12-08 RX ADMIN — CEFTRIAXONE 1 G: 1 INJECTION, POWDER, FOR SOLUTION INTRAMUSCULAR; INTRAVENOUS at 14:00

## 2023-12-08 ASSESSMENT — ENCOUNTER SYMPTOMS
COUGH: 1
HOARSE VOICE: 0
NECK PAIN: 1
SHORTNESS OF BREATH: 0
STRIDOR: 0
VOMITING: 0
HEADACHES: 1
SWOLLEN GLANDS: 1
TROUBLE SWALLOWING: 1
ABDOMINAL PAIN: 1
DIARRHEA: 1
SORE THROAT: 1

## 2023-12-08 ASSESSMENT — PAIN SCALES - GENERAL: PAINLEVEL: 8

## 2023-12-11 LAB
BACTERIA SPEC CULT: ABNORMAL
GRAM STN SPEC: ABNORMAL
GRAM STN SPEC: ABNORMAL

## 2023-12-14 ENCOUNTER — TELEPHONE (OUTPATIENT)
Dept: PRIMARY CARE | Facility: CLINIC | Age: 23
End: 2023-12-14
Payer: COMMERCIAL

## 2023-12-14 NOTE — TELEPHONE ENCOUNTER
Please call patient and see how his tonsillitis is doing - he was going  see IB this week but got rescheduled.

## 2023-12-19 ENCOUNTER — APPOINTMENT (OUTPATIENT)
Dept: PRIMARY CARE | Facility: CLINIC | Age: 23
End: 2023-12-19
Payer: COMMERCIAL

## 2024-01-30 ENCOUNTER — LAB (OUTPATIENT)
Dept: LAB | Facility: LAB | Age: 24
End: 2024-01-30
Payer: COMMERCIAL

## 2024-01-30 DIAGNOSIS — Z91.89 AT RISK FOR HIV DUE TO HOMOSEXUAL CONTACT: ICD-10-CM

## 2024-01-30 DIAGNOSIS — I10 BENIGN ESSENTIAL HYPERTENSION: ICD-10-CM

## 2024-01-30 DIAGNOSIS — E53.8 B12 DEFICIENCY: ICD-10-CM

## 2024-01-30 LAB
ANION GAP SERPL CALC-SCNC: 10 MMOL/L (ref 10–20)
BUN SERPL-MCNC: 9 MG/DL (ref 6–23)
CALCIUM SERPL-MCNC: 9 MG/DL (ref 8.6–10.3)
CHLORIDE SERPL-SCNC: 104 MMOL/L (ref 98–107)
CO2 SERPL-SCNC: 29 MMOL/L (ref 21–32)
CREAT SERPL-MCNC: 0.89 MG/DL (ref 0.5–1.3)
EGFRCR SERPLBLD CKD-EPI 2021: >90 ML/MIN/1.73M*2
ERYTHROCYTE [DISTWIDTH] IN BLOOD BY AUTOMATED COUNT: 12.4 % (ref 11.5–14.5)
GLUCOSE SERPL-MCNC: 96 MG/DL (ref 74–99)
HCT VFR BLD AUTO: 45.1 % (ref 41–52)
HGB BLD-MCNC: 14.4 G/DL (ref 13.5–17.5)
HIV 1+2 AB+HIV1 P24 AG SERPL QL IA: NONREACTIVE
MCH RBC QN AUTO: 26.9 PG (ref 26–34)
MCHC RBC AUTO-ENTMCNC: 31.9 G/DL (ref 32–36)
MCV RBC AUTO: 84 FL (ref 80–100)
NRBC BLD-RTO: 0 /100 WBCS (ref 0–0)
PLATELET # BLD AUTO: 256 X10*3/UL (ref 150–450)
POTASSIUM SERPL-SCNC: 4.2 MMOL/L (ref 3.5–5.3)
RBC # BLD AUTO: 5.36 X10*6/UL (ref 4.5–5.9)
SODIUM SERPL-SCNC: 139 MMOL/L (ref 136–145)
VIT B12 SERPL-MCNC: 205 PG/ML (ref 211–911)
WBC # BLD AUTO: 6 X10*3/UL (ref 4.4–11.3)

## 2024-01-30 PROCEDURE — 85027 COMPLETE CBC AUTOMATED: CPT

## 2024-01-30 PROCEDURE — 87389 HIV-1 AG W/HIV-1&-2 AB AG IA: CPT

## 2024-01-30 PROCEDURE — 36415 COLL VENOUS BLD VENIPUNCTURE: CPT

## 2024-01-30 PROCEDURE — 80048 BASIC METABOLIC PNL TOTAL CA: CPT

## 2024-01-30 PROCEDURE — 82607 VITAMIN B-12: CPT

## 2024-01-31 PROBLEM — J03.90 TONSILLITIS: Status: RESOLVED | Noted: 2023-12-07 | Resolved: 2024-01-31

## 2024-01-31 NOTE — ASSESSMENT & PLAN NOTE
Some breakthrough symptoms - denies suicidal or homicidal ideation.   Start sertraline as directed - Risks, benefits and side effects reviewed with patient.   Continue current dose buspar  Call for worsening depression or anxiety, suicidal or homicidal ideation.   Recheck 6 weeks

## 2024-01-31 NOTE — ASSESSMENT & PLAN NOTE
Increase losartan to 100 mg daily - Risks, benefits and side effects reviewed with patient.   Recheck 6-8 weeks

## 2024-01-31 NOTE — ASSESSMENT & PLAN NOTE
Work on diet reviewed with patient.   Recommend at least 150 minutes of cardiovascular exercise weekly.   Reevaluate in 3 months.

## 2024-01-31 NOTE — PATIENT INSTRUCTIONS
Please start OTC b12 1000 mcg daily    Increase losartan to 100 mg daily, Otherwise continue current medication for hypertension. Continue to monitor blood pressure.  Call if blood pressure consistently >140/90.  Low salt diet recommended.  Increase physical activity as able.  Reevaluate in 6 weeks..      I would like you to follow up in 3 months  Please have all labs that were ordered done at least 1 week prior to your visit.    Recommend healthy diet based around fruits, vegetables, and lean proteins such as chicken, turkey, fish, and beans.  Also include moderate portions of healthy carbohydrates such as wheat bread and pasta, sweet potatoes. Limit sweets and alcoholic beverages. Try not drink more than 100 calories in beverages daily.   It is important to get a protein-rich breakfast daily such as oatmeal, eggs or Greek yogurt.  Increase activity as able to a recommended goal of at least 30 minutes of cardiovascular exercise (walking, swimming, biking, jogging etc.) at least 5 days weekly and a goal of 45 minutes or more most days of the week for weight loss. This exercise can be done all at one time or broken up into 2 or more sessions throughout the day.

## 2024-01-31 NOTE — PROGRESS NOTES
Subjective   Patient ID: Dena Rivero is a 23 y.o. male who presents for Follow-up.    HPI  Patient presents today for follow up labs and chronic conditions.  Would like to start PreP - at risk due to having male sexual partners.  Patient otherwise feels well. No other complaints or concerns.    The patient's relevant past medical, surgical, family, and social history was reviewed in [facility].  All pertinent lab work and results for this visit were reviewed with patient.    Lab on 01/30/2024   Component Date Value Ref Range Status    Vitamin B12 01/30/2024 205 (L)  211 - 911 pg/mL Final    Glucose 01/30/2024 96  74 - 99 mg/dL Final    Sodium 01/30/2024 139  136 - 145 mmol/L Final    Potassium 01/30/2024 4.2  3.5 - 5.3 mmol/L Final    Chloride 01/30/2024 104  98 - 107 mmol/L Final    Bicarbonate 01/30/2024 29  21 - 32 mmol/L Final    Anion Gap 01/30/2024 10  10 - 20 mmol/L Final    Urea Nitrogen 01/30/2024 9  6 - 23 mg/dL Final    Creatinine 01/30/2024 0.89  0.50 - 1.30 mg/dL Final    eGFR 01/30/2024 >90  >60 mL/min/1.73m*2 Final    Calculations of estimated GFR are performed using the 2021 CKD-EPI Study Refit equation without the race variable for the IDMS-Traceable creatinine methods.  https://jasn.asnjournals.org/content/early/2021/09/22/ASN.3390918086    Calcium 01/30/2024 9.0  8.6 - 10.3 mg/dL Final    HIV 1/2 Antigen/Antibody Screen wi* 01/30/2024 Nonreactive  Nonreactive Final    WBC 01/30/2024 6.0  4.4 - 11.3 x10*3/uL Final    nRBC 01/30/2024 0.0  0.0 - 0.0 /100 WBCs Final    RBC 01/30/2024 5.36  4.50 - 5.90 x10*6/uL Final    Hemoglobin 01/30/2024 14.4  13.5 - 17.5 g/dL Final    Hematocrit 01/30/2024 45.1  41.0 - 52.0 % Final    MCV 01/30/2024 84  80 - 100 fL Final    MCH 01/30/2024 26.9  26.0 - 34.0 pg Final    MCHC 01/30/2024 31.9 (L)  32.0 - 36.0 g/dL Final    RDW 01/30/2024 12.4  11.5 - 14.5 % Final    Platelets 01/30/2024 256  150 - 450 x10*3/uL Final           Review of Systems   A complete review of  "systems was performed and all systems were normal except what is noted in the HPI.        Objective   /85   Pulse 82   Temp 36.3 °C (97.3 °F)   Ht 1.956 m (6' 5\")   Wt (!) 169 kg (371 lb 12.8 oz)   SpO2 98%   BMI 44.09 kg/m²    Physical Exam  Constitutional:       General: He is not in acute distress.     Appearance: Normal appearance. He is obese.   HENT:      Head: Normocephalic and atraumatic.   Cardiovascular:      Rate and Rhythm: Normal rate and regular rhythm.      Heart sounds: Normal heart sounds.   Pulmonary:      Effort: Pulmonary effort is normal.      Breath sounds: Normal breath sounds.   Abdominal:      General: Abdomen is flat. Bowel sounds are normal.      Palpations: Abdomen is soft.      Tenderness: There is no abdominal tenderness.   Musculoskeletal:      Right lower leg: No edema.      Left lower leg: No edema.   Neurological:      Mental Status: He is alert.   Psychiatric:         Mood and Affect: Mood normal.         Behavior: Behavior normal.         Thought Content: Thought content normal.         Health Maintenance Due   Topic Date Due    Varicella Vaccines (2 of 2 - 2-dose childhood series) 01/03/2014    COVID-19 Vaccine (3 - Moderna risk series) 06/20/2021    DTaP/Tdap/Td Vaccines (6 - Td or Tdap) 12/22/2021    Influenza Vaccine (1) 09/01/2023        Assessment/Plan   Problem List Items Addressed This Visit       Benign essential hypertension - Primary     Increase losartan to 100 mg daily - Risks, benefits and side effects reviewed with patient.   Recheck 6-8 weeks           Relevant Medications    amLODIPine (Norvasc) 10 mg tablet    chlorthalidone (Hygroton) 25 mg tablet    losartan (Cozaar) 100 mg tablet    Other Relevant Orders    Basic metabolic panel (Completed)    TSH with reflex to Free T4 if abnormal    Lipid Panel    Cholesterol, LDL Direct    Comprehensive Metabolic Panel    Referral to Clinical Pharmacy    At risk for HIV due to homosexual contact     Repeat HIV " negative  Refer to clinical pharmacy for assistance with PreP         Relevant Orders    HIV 1/2 Antigen/Antibody Screen with Reflex to Confirmation (Completed)    Referral to Clinical Pharmacy    Generalized anxiety disorder     Some breakthrough symptoms - denies suicidal or homicidal ideation.   Start sertraline as directed - Risks, benefits and side effects reviewed with patient.   Continue current dose buspar  Call for worsening depression or anxiety, suicidal or homicidal ideation.   Recheck 6 weeks         Relevant Medications    busPIRone (Buspar) 15 mg tablet    sertraline (Zoloft) 50 mg tablet    B12 deficiency     Low  B12 injection today  Restart OTC b12   Recheck 3 months         Relevant Medications    cyanocobalamin (Vitamin B-12) injection 1,000 mcg    Other Relevant Orders    CBC (Completed)    Vitamin B12    CBC    Vitamin B12    Morbid obesity with body mass index (BMI) of 40.0 to 44.9 in adult (CMS/[facility])     Work on diet reviewed with patient.   Recommend at least 150 minutes of cardiovascular exercise weekly.   Reevaluate in 3 months.           Relevant Medications    metFORMIN (Glucophage) 500 mg tablet    Other Relevant Orders    TSH with reflex to Free T4 if abnormal    Hemoglobin A1C         Patient understands and agrees with care plan.           Katerine Osborne MD

## 2024-02-02 ENCOUNTER — OFFICE VISIT (OUTPATIENT)
Dept: PRIMARY CARE | Facility: CLINIC | Age: 24
End: 2024-02-02
Payer: COMMERCIAL

## 2024-02-02 VITALS
BODY MASS INDEX: 37.19 KG/M2 | TEMPERATURE: 97.3 F | HEART RATE: 82 BPM | SYSTOLIC BLOOD PRESSURE: 145 MMHG | WEIGHT: 315 LBS | OXYGEN SATURATION: 98 % | DIASTOLIC BLOOD PRESSURE: 85 MMHG | HEIGHT: 77 IN

## 2024-02-02 DIAGNOSIS — E53.8 B12 DEFICIENCY: ICD-10-CM

## 2024-02-02 DIAGNOSIS — Z91.89 AT RISK FOR HIV DUE TO HOMOSEXUAL CONTACT: ICD-10-CM

## 2024-02-02 DIAGNOSIS — E66.01 MORBID OBESITY WITH BODY MASS INDEX (BMI) OF 40.0 TO 44.9 IN ADULT (MULTI): ICD-10-CM

## 2024-02-02 DIAGNOSIS — I10 BENIGN ESSENTIAL HYPERTENSION: Primary | ICD-10-CM

## 2024-02-02 DIAGNOSIS — F41.1 GENERALIZED ANXIETY DISORDER: ICD-10-CM

## 2024-02-02 PROCEDURE — 3079F DIAST BP 80-89 MM HG: CPT | Performed by: FAMILY MEDICINE

## 2024-02-02 PROCEDURE — 1036F TOBACCO NON-USER: CPT | Performed by: FAMILY MEDICINE

## 2024-02-02 PROCEDURE — 96372 THER/PROPH/DIAG INJ SC/IM: CPT | Performed by: FAMILY MEDICINE

## 2024-02-02 PROCEDURE — 99214 OFFICE O/P EST MOD 30 MIN: CPT | Performed by: FAMILY MEDICINE

## 2024-02-02 PROCEDURE — 90715 TDAP VACCINE 7 YRS/> IM: CPT | Performed by: FAMILY MEDICINE

## 2024-02-02 PROCEDURE — 90471 IMMUNIZATION ADMIN: CPT | Performed by: FAMILY MEDICINE

## 2024-02-02 PROCEDURE — 3077F SYST BP >= 140 MM HG: CPT | Performed by: FAMILY MEDICINE

## 2024-02-02 PROCEDURE — 3008F BODY MASS INDEX DOCD: CPT | Performed by: FAMILY MEDICINE

## 2024-02-02 RX ORDER — BUSPIRONE HYDROCHLORIDE 15 MG/1
15 TABLET ORAL 2 TIMES DAILY
Qty: 180 TABLET | Refills: 3 | Status: SHIPPED | OUTPATIENT
Start: 2024-02-02 | End: 2025-02-01

## 2024-02-02 RX ORDER — METFORMIN HYDROCHLORIDE 500 MG/1
500 TABLET ORAL
Qty: 180 TABLET | Refills: 3 | Status: SHIPPED | OUTPATIENT
Start: 2024-02-02 | End: 2025-02-01

## 2024-02-02 RX ORDER — AMLODIPINE BESYLATE 10 MG/1
10 TABLET ORAL DAILY
Qty: 90 TABLET | Refills: 3 | Status: SHIPPED | OUTPATIENT
Start: 2024-02-02 | End: 2025-02-01

## 2024-02-02 RX ORDER — SERTRALINE HYDROCHLORIDE 50 MG/1
TABLET, FILM COATED ORAL
Qty: 90 TABLET | Refills: 3 | Status: SHIPPED | OUTPATIENT
Start: 2024-02-02

## 2024-02-02 RX ORDER — LANOLIN ALCOHOL/MO/W.PET/CERES
1000 CREAM (GRAM) TOPICAL DAILY
COMMUNITY

## 2024-02-02 RX ORDER — CHLORTHALIDONE 25 MG/1
25 TABLET ORAL DAILY
Qty: 90 TABLET | Refills: 3 | Status: SHIPPED | OUTPATIENT
Start: 2024-02-02 | End: 2025-02-01

## 2024-02-02 RX ORDER — CYANOCOBALAMIN 1000 UG/ML
1000 INJECTION, SOLUTION INTRAMUSCULAR; SUBCUTANEOUS ONCE
Status: COMPLETED | OUTPATIENT
Start: 2024-02-02 | End: 2024-02-02

## 2024-02-02 RX ORDER — LOSARTAN POTASSIUM 100 MG/1
100 TABLET ORAL DAILY
Qty: 90 TABLET | Refills: 3 | Status: SHIPPED | OUTPATIENT
Start: 2024-02-02 | End: 2025-02-01

## 2024-02-02 RX ORDER — LOSARTAN POTASSIUM 50 MG/1
50 TABLET ORAL DAILY
Qty: 30 TABLET | Refills: 11 | Status: CANCELLED | OUTPATIENT
Start: 2024-02-02 | End: 2025-02-01

## 2024-02-02 RX ADMIN — CYANOCOBALAMIN 1000 MCG: 1000 INJECTION, SOLUTION INTRAMUSCULAR; SUBCUTANEOUS at 11:31

## 2024-02-02 ASSESSMENT — PATIENT HEALTH QUESTIONNAIRE - PHQ9
SUM OF ALL RESPONSES TO PHQ9 QUESTIONS 1 AND 2: 0
2. FEELING DOWN, DEPRESSED OR HOPELESS: NOT AT ALL
1. LITTLE INTEREST OR PLEASURE IN DOING THINGS: NOT AT ALL

## 2024-02-16 ENCOUNTER — TELEMEDICINE (OUTPATIENT)
Dept: PHARMACY | Facility: HOSPITAL | Age: 24
End: 2024-02-16
Payer: COMMERCIAL

## 2024-02-16 DIAGNOSIS — Z91.89 AT RISK FOR HIV DUE TO HOMOSEXUAL CONTACT: ICD-10-CM

## 2024-02-16 DIAGNOSIS — I10 BENIGN ESSENTIAL HYPERTENSION: ICD-10-CM

## 2024-02-16 RX ORDER — EMTRICITABINE AND TENOFOVIR DISOPROXIL FUMARATE 200; 300 MG/1; MG/1
1 TABLET, FILM COATED ORAL DAILY
Qty: 30 TABLET | Refills: 0 | Status: SHIPPED | OUTPATIENT
Start: 2024-02-16 | End: 2024-03-15 | Stop reason: SDUPTHER

## 2024-02-16 NOTE — ASSESSMENT & PLAN NOTE
Patient seen today for interest in PrEP. We discuss necessary lab testing once PrEP is initated. Also encouraged the importance of adherence for the prevention of infection. Patient is agreeable to continuing follow up with myself and Dr. Osborne for management. Labs are in order for patient to start PrEP therapy today.     Discuss Truvada (CDC recommended PrEP therapy). We review potential adverse events and importance of adherence to medication, appointments and monitoring.     Plan:   START Truvada 200/300 once daily  New rx sent to [facility] per patient request. If any concerns with ability to get the rx, patient to call me back.   Counseled patient on MOA, expectations, side effects, duration of therapy, contraindications, administration techniques, and monitoring parameters  Answered all other questions and concerns

## 2024-02-16 NOTE — ASSESSMENT & PLAN NOTE
Patient's last office blood pressure was elevated to 145/85 (goal <130/80). At last PCP visit, losartan was increased to 100 mg. Patient tolerated dose increase well. He states that his home blood pressures normally average around 130/80. Education provided today on proper monitoring of blood pressures.     Plan:   CONTINUE amlodipine, losartan and chlorthalidone

## 2024-02-16 NOTE — PROGRESS NOTES
"Subjective   Patient ID: Dena Rivero is a 23 y.o. male who presents for Hypertension, PrEP.    Referring Provider: Katerine Osborne MD     HPI  Hypertension    Current Pharmacotherapy:   Amlodipine 10 mg   Losartan 100 mg (recently increased)  Chlorthalidone 25 mg    Notable Historical Therapy: none    Current Monitoring:   Last Office BP Readin/85    BP Cuff at Home: Yes   Testing BP at Home: Yes, but not regularly (every couple days)  -testing when he gets home from work     Home BP Readings: 130/80 range (no specific values)    Home Blood Pressure Monitoring Education Provided: Yes (24)     PrEP  Last Negative HIV test (every 3 months):2024  Renal Function test (every 6 months): >90 (2024)  Lipid Panel (annual): 2023      Review of Systems    Medication System Management:  Affordability/Accessibility: none  Adherence/Organization: none  Adverse Effects: none    Objective     There were no vitals taken for this visit.     Labs  Lab Results   Component Value Date    BILITOT 0.3 2023    CALCIUM 9.0 2024    CO2 29 2024     2024    CREATININE 0.89 2024    GLUCOSE 96 2024    ALKPHOS 83 2023    K 4.2 2024    PROT 7.7 2023     2024    AST 20 2023    ALT 23 2023    BUN 9 2024    ANIONGAP 10 2024    ALBUMIN 4.1 2023    GFRMALE >90 06/10/2023     Lab Results   Component Value Date    TRIG 61 2023    CHOL 172 2023    HDL 44.9 2023     No results found for: \"HGBA1C\"    Current Outpatient Medications on File Prior to Visit   Medication Sig Dispense Refill    amLODIPine (Norvasc) 10 mg tablet Take 1 tablet (10 mg) by mouth once daily. 90 tablet 3    busPIRone (Buspar) 15 mg tablet Take 1 tablet (15 mg) by mouth 2 times a day. 180 tablet 3    chlorthalidone (Hygroton) 25 mg tablet Take 1 tablet (25 mg) by mouth once daily. 90 tablet 3    cyanocobalamin (Vitamin B-12) 1,000 " mcg tablet Take 1 tablet (1,000 mcg) by mouth once daily.      fluticasone (Flonase) 50 mcg/actuation nasal spray Administer 2 sprays into each nostril once daily. (Patient taking differently: Administer 2 sprays into each nostril if needed for rhinitis or allergies.) 16 g 2    losartan (Cozaar) 100 mg tablet Take 1 tablet (100 mg) by mouth once daily. 90 tablet 3    metFORMIN (Glucophage) 500 mg tablet Take 1 tablet (500 mg) by mouth 2 times a day with meals. 180 tablet 3    sertraline (Zoloft) 50 mg tablet Take 1/2 tab daily for first 8 days then take 1 tab daily 90 tablet 3     No current facility-administered medications on file prior to visit.        Assessment/Plan   Problem List Items Addressed This Visit       Benign essential hypertension     Patient's last office blood pressure was elevated to 145/85 (goal <130/80). At last PCP visit, losartan was increased to 100 mg. Patient tolerated dose increase well. He states that his home blood pressures normally average around 130/80. Education provided today on proper monitoring of blood pressures.     Plan:   CONTINUE amlodipine, losartan and chlorthalidone          At risk for HIV due to homosexual contact     Patient seen today for interest in PrEP. We discuss necessary lab testing once PrEP is initated. Also encouraged the importance of adherence for the prevention of infection. Patient is agreeable to continuing follow up with myself and Dr. Osborne for management. Labs are in order for patient to start PrEP therapy today.     Discuss Truvada (CDC recommended PrEP therapy). We review potential adverse events and importance of adherence to medication, appointments and monitoring.     Plan:   START Truvada 200/300 once daily  New rx sent to [facility] per patient request. If any concerns with ability to get the rx, patient to call me back.   Counseled patient on MOA, expectations, side effects, duration of therapy, contraindications, administration techniques, and  monitoring parameters  Answered all other questions and concerns          Pharmacy Follow Up: 3/15 @ 10:30 AM   PCP Follow Up: 3/25/2024    Gail Mosquera PharmD    Continue all meds under the continuation of care with the referring provider and clinical pharmacy team.

## 2024-03-15 ENCOUNTER — TELEMEDICINE (OUTPATIENT)
Dept: PHARMACY | Facility: HOSPITAL | Age: 24
End: 2024-03-15
Payer: COMMERCIAL

## 2024-03-15 DIAGNOSIS — Z91.89 AT RISK FOR HIV DUE TO HOMOSEXUAL CONTACT: ICD-10-CM

## 2024-03-15 DIAGNOSIS — I10 BENIGN ESSENTIAL HYPERTENSION: Primary | ICD-10-CM

## 2024-03-15 RX ORDER — EMTRICITABINE AND TENOFOVIR DISOPROXIL FUMARATE 200; 300 MG/1; MG/1
1 TABLET, FILM COATED ORAL DAILY
Qty: 30 TABLET | Refills: 2 | Status: SHIPPED | OUTPATIENT
Start: 2024-03-15 | End: 2024-06-13

## 2024-03-15 NOTE — ASSESSMENT & PLAN NOTE
Patient's last office blood pressure was elevated to 145/85 (goal <130/80). At last PCP visit, losartan was increased to 100 mg. Patient states that his blood pressures continue to stay close to goal of 130/80, with the occasional pressure reaching closer to 140/80. Encourage him to continue to test blood pressures a few times weekly to allow for improved accuracy of medication titration.    Plan:   CONTINUE amlodipine, losartan and chlorthalidone

## 2024-03-15 NOTE — PROGRESS NOTES
"Subjective   Patient ID: Dena Rivero is a 23 y.o. male who presents for Hypertension, PrEP.    Referring Provider: Katerine Osborne MD     HPI  Hypertension    Current Pharmacotherapy:   Amlodipine 10 mg   Losartan 100 mg (recently increased)  Chlorthalidone 25 mg    Notable Historical Therapy: none    Current Monitoring:   Last Office BP Readin/85    BP Cuff at Home: Yes   Testing BP at Home: Yes, but not regularly (every couple days)  -testing when he gets home from work     Home BP Readings: 130/80 range (no specific values)    Home Blood Pressure Monitoring Education Provided: Yes (24)     PrEP  Last Negative HIV test (every 3 months): negative (2024)  Renal Function test (every 6 months): >90 (2024)  Lipid Panel (annual): 2023      Review of Systems    Medication System Management:  Affordability/Accessibility: none  Adherence/Organization: none  Adverse Effects: none    Objective     There were no vitals taken for this visit.     Labs  Lab Results   Component Value Date    BILITOT 0.3 2023    CALCIUM 9.0 2024    CO2 29 2024     2024    CREATININE 0.89 2024    GLUCOSE 96 2024    ALKPHOS 83 2023    K 4.2 2024    PROT 7.7 2023     2024    AST 20 2023    ALT 23 2023    BUN 9 2024    ANIONGAP 10 2024    ALBUMIN 4.1 2023    GFRMALE >90 06/10/2023     Lab Results   Component Value Date    TRIG 61 2023    CHOL 172 2023    HDL 44.9 2023     No results found for: \"HGBA1C\"    Current Outpatient Medications on File Prior to Visit   Medication Sig Dispense Refill    amLODIPine (Norvasc) 10 mg tablet Take 1 tablet (10 mg) by mouth once daily. 90 tablet 3    busPIRone (Buspar) 15 mg tablet Take 1 tablet (15 mg) by mouth 2 times a day. 180 tablet 3    chlorthalidone (Hygroton) 25 mg tablet Take 1 tablet (25 mg) by mouth once daily. 90 tablet 3    cyanocobalamin (Vitamin " B-12) 1,000 mcg tablet Take 1 tablet (1,000 mcg) by mouth once daily.      fluticasone (Flonase) 50 mcg/actuation nasal spray Administer 2 sprays into each nostril once daily. (Patient taking differently: Administer 2 sprays into each nostril if needed for rhinitis or allergies.) 16 g 2    losartan (Cozaar) 100 mg tablet Take 1 tablet (100 mg) by mouth once daily. 90 tablet 3    metFORMIN (Glucophage) 500 mg tablet Take 1 tablet (500 mg) by mouth 2 times a day with meals. 180 tablet 3    sertraline (Zoloft) 50 mg tablet Take 1/2 tab daily for first 8 days then take 1 tab daily 90 tablet 3    [DISCONTINUED] emtricitabine-tenofovir, TDF, (Truvada) 200-300 mg tablet Take 1 tablet by mouth once daily. 30 tablet 0     No current facility-administered medications on file prior to visit.        Assessment/Plan   Problem List Items Addressed This Visit       Benign essential hypertension - Primary     Patient's last office blood pressure was elevated to 145/85 (goal <130/80). At last PCP visit, losartan was increased to 100 mg. Patient states that his blood pressures continue to stay close to goal of 130/80, with the occasional pressure reaching closer to 140/80. Encourage him to continue to test blood pressures a few times weekly to allow for improved accuracy of medication titration.    Plan:   CONTINUE amlodipine, losartan and chlorthalidone          At risk for HIV due to homosexual contact     At last visit, patient started PrEP therapy with Truvada. He has had some off-and-on GI upset. We discuss trying to take daily with food to see if this will improve symptoms. Patient is agreeable. Otherwise, no questions or concerns with therapy at this time.     Patient will be due for HIV test and lipid panel at the end of April, 2024. Asked patient to please stop and get the labs approximately 1 week prior to appointment.     Plan:   CONTINUE Truvada 200/300 once daily  Refills sent to [facility] per patient request. If any concerns  with ability to get the rx, patient to call me back.          Relevant Medications    emtricitabine-tenofovir, TDF, (Truvada) 200-300 mg tablet    Other Relevant Orders    Lipid panel    HIV 1/2 Antigen/Antibody Screen with Reflex to Confirmation    LDL cholesterol, direct     Pharmacy Follow Up: 4/26 @ 10:30 AM   PCP Follow Up: 3/25/2024    Gail Mosquera, PharmD    Continue all meds under the continuation of care with the referring provider and clinical pharmacy team.

## 2024-03-15 NOTE — ASSESSMENT & PLAN NOTE
At last visit, patient started PrEP therapy with Truvada. He has had some off-and-on GI upset. We discuss trying to take daily with food to see if this will improve symptoms. Patient is agreeable. Otherwise, no questions or concerns with therapy at this time.     Patient will be due for HIV test and lipid panel at the end of April, 2024. Asked patient to please stop and get the labs approximately 1 week prior to appointment.     Plan:   CONTINUE Truvada 200/300 once daily  Refills sent to [facility] per patient request. If any concerns with ability to get the rx, patient to call me back.

## 2024-04-26 ENCOUNTER — APPOINTMENT (OUTPATIENT)
Dept: PHARMACY | Facility: HOSPITAL | Age: 24
End: 2024-04-26
Payer: COMMERCIAL

## 2024-05-10 ENCOUNTER — APPOINTMENT (OUTPATIENT)
Dept: PRIMARY CARE | Facility: CLINIC | Age: 24
End: 2024-05-10
Payer: COMMERCIAL

## 2024-06-21 DIAGNOSIS — J01.90 ACUTE NON-RECURRENT SINUSITIS, UNSPECIFIED LOCATION: Primary | ICD-10-CM

## 2024-06-21 RX ORDER — DOXYCYCLINE 100 MG/1
100 TABLET ORAL 2 TIMES DAILY
Qty: 20 TABLET | Refills: 0 | Status: SHIPPED | OUTPATIENT
Start: 2024-06-21 | End: 2024-07-01

## 2024-06-22 ENCOUNTER — HOSPITAL ENCOUNTER (EMERGENCY)
Facility: HOSPITAL | Age: 24
Discharge: HOME | End: 2024-06-22
Attending: EMERGENCY MEDICINE
Payer: COMMERCIAL

## 2024-06-22 ENCOUNTER — APPOINTMENT (OUTPATIENT)
Dept: RADIOLOGY | Facility: HOSPITAL | Age: 24
End: 2024-06-22
Payer: COMMERCIAL

## 2024-06-22 VITALS
DIASTOLIC BLOOD PRESSURE: 92 MMHG | OXYGEN SATURATION: 100 % | HEART RATE: 78 BPM | SYSTOLIC BLOOD PRESSURE: 146 MMHG | TEMPERATURE: 98.3 F | RESPIRATION RATE: 18 BRPM | HEIGHT: 77 IN | WEIGHT: 315 LBS | BODY MASS INDEX: 37.19 KG/M2

## 2024-06-22 DIAGNOSIS — G51.0 RIGHT-SIDED BELL'S PALSY: Primary | ICD-10-CM

## 2024-06-22 LAB
ANION GAP SERPL CALC-SCNC: 12 MMOL/L (ref 10–20)
BASOPHILS # BLD AUTO: 0.04 X10*3/UL (ref 0–0.1)
BASOPHILS NFR BLD AUTO: 0.5 %
BUN SERPL-MCNC: 11 MG/DL (ref 6–23)
CALCIUM SERPL-MCNC: 9.1 MG/DL (ref 8.6–10.3)
CHLORIDE SERPL-SCNC: 105 MMOL/L (ref 98–107)
CO2 SERPL-SCNC: 25 MMOL/L (ref 21–32)
CREAT SERPL-MCNC: 0.94 MG/DL (ref 0.5–1.3)
EGFRCR SERPLBLD CKD-EPI 2021: >90 ML/MIN/1.73M*2
EOSINOPHIL # BLD AUTO: 0.04 X10*3/UL (ref 0–0.7)
EOSINOPHIL NFR BLD AUTO: 0.5 %
ERYTHROCYTE [DISTWIDTH] IN BLOOD BY AUTOMATED COUNT: 11.9 % (ref 11.5–14.5)
GLUCOSE BLD MANUAL STRIP-MCNC: 98 MG/DL (ref 74–99)
GLUCOSE SERPL-MCNC: 92 MG/DL (ref 74–99)
HCT VFR BLD AUTO: 44.8 % (ref 36–52)
HGB BLD-MCNC: 15.3 G/DL (ref 12–17.5)
HIV 1+2 AB+HIV1 P24 AG SERPL QL IA: NONREACTIVE
IMM GRANULOCYTES # BLD AUTO: 0.01 X10*3/UL (ref 0–0.7)
IMM GRANULOCYTES NFR BLD AUTO: 0.1 % (ref 0–0.9)
LYMPHOCYTES # BLD AUTO: 2.49 X10*3/UL (ref 1.2–4.8)
LYMPHOCYTES NFR BLD AUTO: 30.4 %
MCH RBC QN AUTO: 28.3 PG (ref 26–34)
MCHC RBC AUTO-ENTMCNC: 34.2 G/DL (ref 32–36)
MCV RBC AUTO: 83 FL (ref 80–100)
MONOCYTES # BLD AUTO: 0.79 X10*3/UL (ref 0.1–1)
MONOCYTES NFR BLD AUTO: 9.6 %
NEUTROPHILS # BLD AUTO: 4.83 X10*3/UL (ref 1.2–7.7)
NEUTROPHILS NFR BLD AUTO: 58.9 %
NRBC BLD-RTO: 0 /100 WBCS (ref 0–0)
PLATELET # BLD AUTO: 268 X10*3/UL (ref 150–450)
POTASSIUM SERPL-SCNC: 3.8 MMOL/L (ref 3.5–5.3)
RBC # BLD AUTO: 5.41 X10*6/UL (ref 4–5.9)
SODIUM SERPL-SCNC: 138 MMOL/L (ref 136–145)
WBC # BLD AUTO: 8.2 X10*3/UL (ref 4.4–11.3)

## 2024-06-22 PROCEDURE — 2500000002 HC RX 250 W HCPCS SELF ADMINISTERED DRUGS (ALT 637 FOR MEDICARE OP, ALT 636 FOR OP/ED): Performed by: EMERGENCY MEDICINE

## 2024-06-22 PROCEDURE — 82947 ASSAY GLUCOSE BLOOD QUANT: CPT

## 2024-06-22 PROCEDURE — 80074 ACUTE HEPATITIS PANEL: CPT | Mod: PORLAB | Performed by: EMERGENCY MEDICINE

## 2024-06-22 PROCEDURE — 36415 COLL VENOUS BLD VENIPUNCTURE: CPT | Performed by: EMERGENCY MEDICINE

## 2024-06-22 PROCEDURE — 2500000004 HC RX 250 GENERAL PHARMACY W/ HCPCS (ALT 636 FOR OP/ED): Performed by: EMERGENCY MEDICINE

## 2024-06-22 PROCEDURE — 85025 COMPLETE CBC W/AUTO DIFF WBC: CPT | Performed by: EMERGENCY MEDICINE

## 2024-06-22 PROCEDURE — 87389 HIV-1 AG W/HIV-1&-2 AB AG IA: CPT | Mod: PORLAB | Performed by: EMERGENCY MEDICINE

## 2024-06-22 PROCEDURE — 71045 X-RAY EXAM CHEST 1 VIEW: CPT | Mod: FOREIGN READ | Performed by: RADIOLOGY

## 2024-06-22 PROCEDURE — 99283 EMERGENCY DEPT VISIT LOW MDM: CPT

## 2024-06-22 PROCEDURE — 86780 TREPONEMA PALLIDUM: CPT | Mod: PORLAB | Performed by: EMERGENCY MEDICINE

## 2024-06-22 PROCEDURE — 71045 X-RAY EXAM CHEST 1 VIEW: CPT

## 2024-06-22 PROCEDURE — 80048 BASIC METABOLIC PNL TOTAL CA: CPT | Performed by: EMERGENCY MEDICINE

## 2024-06-22 RX ORDER — PREDNISONE 20 MG/1
60 TABLET ORAL ONCE
Status: COMPLETED | OUTPATIENT
Start: 2024-06-22 | End: 2024-06-22

## 2024-06-22 RX ORDER — VALACYCLOVIR HYDROCHLORIDE 1 G/1
1000 TABLET, FILM COATED ORAL ONCE
Status: COMPLETED | OUTPATIENT
Start: 2024-06-22 | End: 2024-06-22

## 2024-06-22 RX ORDER — PREDNISONE 20 MG/1
60 TABLET ORAL DAILY
Qty: 21 TABLET | Refills: 0 | Status: SHIPPED | OUTPATIENT
Start: 2024-06-22 | End: 2024-06-29

## 2024-06-22 RX ORDER — VALACYCLOVIR HYDROCHLORIDE 1 G/1
1000 TABLET, FILM COATED ORAL 3 TIMES DAILY
Qty: 21 TABLET | Refills: 0 | Status: SHIPPED | OUTPATIENT
Start: 2024-06-22 | End: 2024-06-29

## 2024-06-22 RX ORDER — VALACYCLOVIR HYDROCHLORIDE 1 G/1
1000 TABLET, FILM COATED ORAL EVERY 8 HOURS SCHEDULED
Status: DISCONTINUED | OUTPATIENT
Start: 2024-06-22 | End: 2024-06-22

## 2024-06-22 RX ADMIN — VALACYCLOVIR 1000 MG: 1 TABLET, FILM COATED ORAL at 20:16

## 2024-06-22 RX ADMIN — PREDNISONE 60 MG: 20 TABLET ORAL at 18:42

## 2024-06-22 ASSESSMENT — LIFESTYLE VARIABLES
EVER FELT BAD OR GUILTY ABOUT YOUR DRINKING: NO
HAVE YOU EVER FELT YOU SHOULD CUT DOWN ON YOUR DRINKING: NO
HAVE PEOPLE ANNOYED YOU BY CRITICIZING YOUR DRINKING: NO
EVER HAD A DRINK FIRST THING IN THE MORNING TO STEADY YOUR NERVES TO GET RID OF A HANGOVER: NO
TOTAL SCORE: 0

## 2024-06-22 ASSESSMENT — PAIN SCALES - GENERAL
PAINLEVEL_OUTOF10: 0 - NO PAIN
PAINLEVEL_OUTOF10: 0 - NO PAIN

## 2024-06-22 ASSESSMENT — PAIN - FUNCTIONAL ASSESSMENT: PAIN_FUNCTIONAL_ASSESSMENT: 0-10

## 2024-06-22 ASSESSMENT — COLUMBIA-SUICIDE SEVERITY RATING SCALE - C-SSRS
6. HAVE YOU EVER DONE ANYTHING, STARTED TO DO ANYTHING, OR PREPARED TO DO ANYTHING TO END YOUR LIFE?: NO
1. IN THE PAST MONTH, HAVE YOU WISHED YOU WERE DEAD OR WISHED YOU COULD GO TO SLEEP AND NOT WAKE UP?: NO
2. HAVE YOU ACTUALLY HAD ANY THOUGHTS OF KILLING YOURSELF?: NO

## 2024-06-22 NOTE — DISCHARGE INSTRUCTIONS
Please take the antiviral medications and steroids for the next 1 week.  Please use the artificial tears in your right eye to avoid dryness as her right eye may not close completely.  Please follow-up with your primary care doctor as well as her eye doctor within the next week.  Return for any worsening facial droop, numbness, weakness or any other concerning symptoms.

## 2024-06-22 NOTE — ED PROVIDER NOTES
HPI   No chief complaint on file.      HPI    This is a 23-year-old nonbinary patient who presents with facial droop that started at approximately 11 AM.  Patient was driving to visit a friend and they felt unsteadiness in the right side of their face.  Friend noticed facial droop.  Patient noted recent illness specifically stating that her allergies were acting up and it had a nonproductive cough.  Was put on an antibiotic for this.  Denies any other focal numbness or weakness in arms or legs, denies any speech deficits.  Denies any recent head trauma.                    No data recorded                     Patient History   Past Medical History:   Diagnosis Date    Allergic     Anemia     Headache     Hypertension     Mild anemia 04/14/2023    Personal history of diseases of the blood and blood-forming organs and certain disorders involving the immune mechanism     History of sickle cell trait    Personal history of diseases of the skin and subcutaneous tissue 03/07/2017    History of keloid of skin    Personal history of other diseases of urinary system 08/22/2016    History of hematuria    Tonsillitis 12/07/2023     No past surgical history on file.  Family History   Problem Relation Name Age of Onset    Hypertension Maternal Grandmother Malaika Rivero     Diabetes Maternal Grandmother Malaika Rivero      Social History     Tobacco Use    Smoking status: Never    Smokeless tobacco: Never   Vaping Use    Vaping status: Some Days    Substances: CBD    Devices: Disposable   Substance Use Topics    Alcohol use: Not Currently     Comment: Socially.    Drug use: Never       Physical Exam   ED Triage Vitals [06/22/24 1742]   Temperature Heart Rate Respirations BP   36.8 °C (98.3 °F) 91 16 (!) 210/136      Pulse Ox Temp Source Heart Rate Source Patient Position   97 % Temporal Monitor Sitting      BP Location FiO2 (%)     Left arm --       Physical Exam  Vitals reviewed.   Constitutional:       Appearance: Normal  appearance. Lad is obese.   HENT:      Head: Normocephalic and atraumatic.   Eyes:      Extraocular Movements: Extraocular movements intact.      Conjunctiva/sclera: Conjunctivae normal.      Pupils: Pupils are equal, round, and reactive to light.   Cardiovascular:      Rate and Rhythm: Normal rate.      Pulses: Normal pulses.   Pulmonary:      Effort: Pulmonary effort is normal.   Musculoskeletal:         General: Normal range of motion.      Cervical back: Normal range of motion and neck supple.   Skin:     General: Skin is warm and dry.      Capillary Refill: Capillary refill takes less than 2 seconds.   Neurological:      Mental Status: Lad is alert.      Comments: Right facial droop, diminished eyebrow lift on right, increased sensation to light touch on left side of face, 5/5 strength in all 4 extremities, normal sensation intact to light touch in all 4 extremities.  Normal finger-nose and heel-to-shin bilaterally.         ED Course & MDM   Diagnoses as of 06/26/24 0508   Right-sided Bell's palsy       Medical Decision Making  EKG interpreted by myself (ED attending physician). I independently interpreted the (Study: EKG), which showed - n/a, not ordered    External Records Reviewed: I reviewed recent and relevant outside records including: Reviewed prior ED notes for past medical history, has a history of sickle cell trait, hypertension    Independent Interpretation of Studies:  I independently interpreted the [CXR, CT, POCUS, etc.], which showed   -reviewed portable chest x-ray which was negative for any signs of pneumonia, cardiomegaly, or other acute cardiac or respiratory abnormalities.    Escalation of Care:  Appropriate for outpatient management    Patient comes in with right facial droop that started at 11 AM.  On my initial evaluation patient was found to have right-sided Bell's palsy, there was clear forehead involvement.  Patient had no other focal numbness, weakness or speech deficits.  Will start  oral antivirals as well as prednisone here as well as start artificial tears.  Potential etiology is prior upper respiratory tract infection.  Chest x-ray was obtained to evaluate for progression to pneumonia - this was negative for PNA on my read     Social Determinants Affecting Care: none    Prescription Drug Consideration: Steroids, antivirals, eyedrops    Diagnostic testing considered: Basic labs ordered which did not show any abnormalities    Discussion of Management with Other Providers:   I discussed the patient/results with: Spoke with patient and mother about Bell's palsy in depth.  Patient does have a primary care doctor as well as an eye doctor.  Recommended close outpatient follow-up.  Will also put referral for ENT for the Bell's palsy.  Discussed outpatient treatment       Raul Wood MD MPH  06/22/24 5048       Raul Wood MD MPH  06/26/24 4742

## 2024-06-23 LAB
HAV IGM SER QL: NONREACTIVE
HBV CORE IGM SER QL: NONREACTIVE
HBV SURFACE AG SERPL QL IA: NONREACTIVE
HCV AB SER QL: NONREACTIVE
TREPONEMA PALLIDUM IGG+IGM AB [PRESENCE] IN SERUM OR PLASMA BY IMMUNOASSAY: NONREACTIVE

## 2024-06-24 ENCOUNTER — OFFICE VISIT (OUTPATIENT)
Dept: PRIMARY CARE | Facility: CLINIC | Age: 24
End: 2024-06-24
Payer: COMMERCIAL

## 2024-06-24 VITALS
BODY MASS INDEX: 37.19 KG/M2 | HEART RATE: 83 BPM | OXYGEN SATURATION: 95 % | SYSTOLIC BLOOD PRESSURE: 165 MMHG | TEMPERATURE: 97.5 F | DIASTOLIC BLOOD PRESSURE: 92 MMHG | RESPIRATION RATE: 18 BRPM | WEIGHT: 315 LBS | HEIGHT: 77 IN

## 2024-06-24 DIAGNOSIS — J30.2 SEASONAL ALLERGIES: Primary | ICD-10-CM

## 2024-06-24 DIAGNOSIS — I10 BENIGN ESSENTIAL HYPERTENSION: ICD-10-CM

## 2024-06-24 DIAGNOSIS — J06.9 ACUTE UPPER RESPIRATORY INFECTION: ICD-10-CM

## 2024-06-24 DIAGNOSIS — G51.0 BELL'S PALSY: ICD-10-CM

## 2024-06-24 PROCEDURE — 99214 OFFICE O/P EST MOD 30 MIN: CPT | Performed by: NURSE PRACTITIONER

## 2024-06-24 PROCEDURE — 3077F SYST BP >= 140 MM HG: CPT | Performed by: NURSE PRACTITIONER

## 2024-06-24 PROCEDURE — 3008F BODY MASS INDEX DOCD: CPT | Performed by: NURSE PRACTITIONER

## 2024-06-24 PROCEDURE — 3078F DIAST BP <80 MM HG: CPT | Performed by: NURSE PRACTITIONER

## 2024-06-24 PROCEDURE — 1036F TOBACCO NON-USER: CPT | Performed by: NURSE PRACTITIONER

## 2024-06-24 RX ORDER — EMTRICITABINE AND TENOFOVIR DISOPROXIL FUMARATE 200; 300 MG/1; MG/1
1 TABLET, FILM COATED ORAL
COMMUNITY
Start: 2024-06-20

## 2024-06-24 RX ORDER — FLUTICASONE PROPIONATE 50 MCG
2 SPRAY, SUSPENSION (ML) NASAL DAILY
Qty: 16 G | Refills: 2 | Status: SHIPPED | OUTPATIENT
Start: 2024-06-24

## 2024-06-24 RX ORDER — AZITHROMYCIN 250 MG/1
TABLET, FILM COATED ORAL DAILY
Qty: 6 TABLET | Refills: 0 | Status: SHIPPED | OUTPATIENT
Start: 2024-06-24 | End: 2024-06-29

## 2024-06-24 ASSESSMENT — ENCOUNTER SYMPTOMS
PALPITATIONS: 0
DIARRHEA: 0
COUGH: 1
WEAKNESS: 0
FEVER: 0
SINUS PAIN: 1
SORE THROAT: 0
HEADACHES: 0
FATIGUE: 0
NAUSEA: 0
CONSTIPATION: 0
DIZZINESS: 0
VOMITING: 0
SHORTNESS OF BREATH: 0
WHEEZING: 0
SINUS PRESSURE: 1

## 2024-06-24 NOTE — PROGRESS NOTES
"Subjective   Patient ID: Robert Rivero is a 23 y.o. adult who presents for ER Follow-up (6/22/24), Hypertension, Bell's Palsy, and Cough.    Emergency department follow-up.  Patient seen in ED on 6/22/2024 and diagnosed with Bell's palsy/right sided facial weakness  Patient treated with prednisone taper and antiviral  Also had a chest x-ray for follow-up pneumonia.  Recent treatment with antibiotic course prior to right-sided facial drooping occurring.  Chest x-ray unremarkable    Hypertension: Elevated on today.  Likely related to prednisone taper  Patient has taken chlorthalidone, losartan, and amlodipine on today  Asymptomatic  Reports that typically he is 140 systolic and 80s diastolic    Upper respiratory infection/seasonal allergies: Patient on doxycycline.  Chest x-ray unremarkable.  Asking for change in antibiotic.  Has not felt any relief of congestion, cough and sinus pressure    ER Follow-up  Associated symptoms include coughing. Pertinent negatives include no chest pain, fatigue, fever, headaches, nausea, sore throat, vomiting or weakness.        Review of Systems   Constitutional:  Negative for fatigue and fever.   HENT:  Positive for postnasal drip, sinus pressure, sinus pain and sneezing. Negative for ear pain and sore throat.    Respiratory:  Positive for cough. Negative for shortness of breath and wheezing.    Cardiovascular:  Negative for chest pain and palpitations.   Gastrointestinal:  Negative for constipation, diarrhea, nausea and vomiting.   Neurological:  Negative for dizziness, weakness and headaches.       Objective   BP (!) 165/92   Pulse 83   Temp 36.4 °C (97.5 °F) (Temporal)   Resp 18   Ht 1.956 m (6' 5\")   Wt (!) 162 kg (357 lb 3.2 oz)   SpO2 95%   BMI 42.36 kg/m²     Physical Exam  Vitals reviewed.   Constitutional:       Appearance: Robert is obese.   HENT:      Right Ear: Tympanic membrane normal.      Left Ear: Tympanic membrane normal.      Nose: Congestion present. "   Cardiovascular:      Rate and Rhythm: Normal rate and regular rhythm.      Pulses: Normal pulses.      Heart sounds: Normal heart sounds.   Pulmonary:      Breath sounds: Normal breath sounds.   Neurological:      Mental Status: Lad is alert and oriented to person, place, and time.   Psychiatric:         Mood and Affect: Mood normal.         Behavior: Behavior normal.         Assessment/Plan   Problem List Items Addressed This Visit             ICD-10-CM    Benign essential hypertension I10     No added salt diet, increase physical activity  Continue losartan 100 mg, chlorthalidone 25 mg, amlodipine 10 mg  Follow-up 1 month  Likely increased due to prednisone         Seasonal allergies - Primary J30.2     Refill on flonase   Follow up 1 month for eval         Relevant Medications    fluticasone (Flonase) 50 mcg/actuation nasal spray    azithromycin (Zithromax) 250 mg tablet    Acute upper respiratory infection J06.9     Discontinue doxycycline  Start Z-Cristian as directed  Follow-up 1 month for eval         Relevant Medications    fluticasone (Flonase) 50 mcg/actuation nasal spray    azithromycin (Zithromax) 250 mg tablet    Bell's palsy G51.0     Continue prednisone taper, antiviral   Some improvement   No numbness, tingling or speech changes.

## 2024-06-24 NOTE — ASSESSMENT & PLAN NOTE
Continue prednisone taper, antiviral   Some improvement   No numbness, tingling or speech changes.

## 2024-06-24 NOTE — ASSESSMENT & PLAN NOTE
No added salt diet, increase physical activity  Continue losartan 100 mg, chlorthalidone 25 mg, amlodipine 10 mg  Follow-up 1 month  Likely increased due to prednisone

## 2024-06-28 ENCOUNTER — TELEPHONE (OUTPATIENT)
Dept: PRIMARY CARE | Facility: CLINIC | Age: 24
End: 2024-06-28
Payer: COMMERCIAL

## 2024-06-28 DIAGNOSIS — G51.0 RIGHT-SIDED BELL'S PALSY: ICD-10-CM

## 2024-06-28 NOTE — TELEPHONE ENCOUNTER
Spoke with patient gave  recommendations to give medication some time to work if not better after weekend to call back next step would be PT

## 2024-06-28 NOTE — TELEPHONE ENCOUNTER
Rx Refill Request Telephone Encounter    Name:  Dena Rivero  :  [id number]  Medication Name:        valACYclovir (Valtrex) 1 gram tablet [[id number]]  1 tab taken by mouth 3 times a day      predniSONE (Deltasone) tablet 60 mg   [[id number]] take 3 tabs by mouth once daily                      Specific Pharmacy location:  [facility] in [place]  Date of last appointment:  2024  Date of next appointment:  2024  Best number to reach patient:  [phone]

## 2024-07-25 ENCOUNTER — APPOINTMENT (OUTPATIENT)
Dept: PRIMARY CARE | Facility: CLINIC | Age: 24
End: 2024-07-25
Payer: COMMERCIAL

## 2024-09-20 ENCOUNTER — APPOINTMENT (OUTPATIENT)
Dept: PRIMARY CARE | Facility: CLINIC | Age: 24
End: 2024-09-20
Payer: COMMERCIAL

## 2024-11-22 DIAGNOSIS — I10 BENIGN ESSENTIAL HYPERTENSION: Primary | ICD-10-CM

## 2024-12-02 ENCOUNTER — APPOINTMENT (OUTPATIENT)
Dept: PHARMACY | Facility: HOSPITAL | Age: 24
End: 2024-12-02
Payer: COMMERCIAL

## 2024-12-02 DIAGNOSIS — I10 BENIGN ESSENTIAL HYPERTENSION: ICD-10-CM

## 2024-12-02 DIAGNOSIS — Z29.81 ENCOUNTER FOR PRE-EXPOSURE PROPHYLAXIS FOR HIV: Primary | ICD-10-CM

## 2024-12-02 DIAGNOSIS — F41.1 GENERALIZED ANXIETY DISORDER: ICD-10-CM

## 2024-12-02 RX ORDER — BUSPIRONE HYDROCHLORIDE 15 MG/1
15 TABLET ORAL 2 TIMES DAILY
Qty: 180 TABLET | Refills: 0 | Status: SHIPPED | OUTPATIENT
Start: 2024-12-02 | End: 2025-12-02

## 2024-12-02 RX ORDER — SERTRALINE HYDROCHLORIDE 50 MG/1
TABLET, FILM COATED ORAL
Qty: 90 TABLET | Refills: 0 | Status: SHIPPED | OUTPATIENT
Start: 2024-12-02

## 2024-12-02 RX ORDER — CHLORTHALIDONE 25 MG/1
25 TABLET ORAL DAILY
Qty: 90 TABLET | Refills: 0 | Status: SHIPPED | OUTPATIENT
Start: 2024-12-02 | End: 2025-12-02

## 2024-12-02 RX ORDER — LOSARTAN POTASSIUM 100 MG/1
100 TABLET ORAL DAILY
Qty: 90 TABLET | Refills: 0 | Status: SHIPPED | OUTPATIENT
Start: 2024-12-02 | End: 2025-12-02

## 2024-12-02 RX ORDER — AMLODIPINE BESYLATE 10 MG/1
10 TABLET ORAL DAILY
Qty: 90 TABLET | Refills: 0 | Status: SHIPPED | OUTPATIENT
Start: 2024-12-02 | End: 2025-12-02

## 2024-12-02 NOTE — ASSESSMENT & PLAN NOTE
At last visit, patient started PrEP therapy with Truvada. Patient has done well with therapy.     We discuss that it is time for updated labs. Patient to get updated HIV, lipid panel, and renal function panel this week. Will then be eligible for further refills.     Plan:   CONTINUE Truvada 200/300 once daily  Refills sent to [facility] per patient request. If any concerns with ability to get the rx, patient to call me back.

## 2024-12-02 NOTE — PROGRESS NOTES
"  Clinical Pharmacy Appointment    Patient ID: Dena Rivero \"Robert\" is a 24 y.o. adult who presents for PrEP.    Pt is here for Follow Up     Referring Provider: Princess Quinonez APRN-CNP  PCP: DEBORAH Tijerina   Last visit with PCP: 2024   Next visit with PCP: --      Subjective     Interval History  N/A    HPI  Hypertension  Current Pharmacotherapy:   Amlodipine 10 mg   Losartan 100 mg   Chlorthalidone 25 mg     Notable Historical Therapy: none     Current Monitoring:   Last Office BP Readin/92     BP Cuff at Home: Yes   Testing BP at Home: Yes, but not regularly (every couple days)  -testing when he gets home from work      Home BP Readings:      Home Blood Pressure Monitoring Education Provided: Yes (24)     PrEP  Last Negative HIV test (every 3 months): negative (2024)  Renal Function test (every 6 months): >90 (2024)  Lipid Panel (annual): 2023      Objective   No Known Allergies  Social History     Social History Narrative    Not on file      Medication Review  Current Outpatient Medications   Medication Instructions    amLODIPine (NORVASC) 10 mg, oral, Daily    busPIRone (BUSPAR) 15 mg, oral, 2 times daily    chlorthalidone (HYGROTON) 25 mg, oral, Daily    cyanocobalamin (VITAMIN B-12) 1,000 mcg, oral, Daily    emtricitabine-tenofovir, TDF, (Truvada) 200-300 mg tablet 1 tablet, oral, Daily (630)    fluticasone (Flonase) 50 mcg/actuation nasal spray 2 sprays, Each Nostril, Daily    losartan (COZAAR) 100 mg, oral, Daily    sertraline (Zoloft) 50 mg tablet Take 1/2 tab daily for first 8 days then take 1 tab daily      Vitals  BP Readings from Last 2 Encounters:   24 (!) 165/92   24 (!) 146/92     BMI Readings from Last 1 Encounters:   24 42.36 kg/m²      Labs  A1C  No results found for: \"HGBA1C\"  [facility]  Lab Results   Component Value Date    CALCIUM 9.1 2024     2024    K 3.8 2024    CO2 25 2024     2024    BUN 11 " "06/22/2024    CREATININE 0.94 06/22/2024    EGFR >90 06/22/2024     LFTs  Lab Results   Component Value Date    ALT 23 04/17/2023    AST 20 04/17/2023    ALKPHOS 83 04/17/2023    BILITOT 0.3 04/17/2023     FLP  Lab Results   Component Value Date    TRIG 61 04/17/2023    CHOL 172 04/17/2023    LDLF 115 04/17/2023    HDL 44.9 04/17/2023     Urine Microalbumin  No results found for: \"MICROALBCREA\"    Weight Management  Wt Readings from Last 3 Encounters:   06/24/24 (!) 162 kg (357 lb 3.2 oz)   06/22/24 143 kg (316 lb)   02/02/24 (!) 169 kg (371 lb 12.8 oz)      There is no height or weight on file to calculate BMI.     Assessment/Plan   Problem List Items Addressed This Visit       Benign essential hypertension    Relevant Medications    amLODIPine (Norvasc) 10 mg tablet    chlorthalidone (Hygroton) 25 mg tablet    losartan (Cozaar) 100 mg tablet    Encounter for pre-exposure prophylaxis for HIV - Primary     At last visit, patient started PrEP therapy with Truvada. Patient has done well with therapy.     We discuss that it is time for updated labs. Patient to get updated HIV, lipid panel, and renal function panel this week. Will then be eligible for further refills.     Plan:   CONTINUE Truvada 200/300 once daily  Refills sent to [facility] per patient request. If any concerns with ability to get the rx, patient to call me back.          Relevant Orders    HIV 1/2 Antigen/Antibody Screen with Reflex to Confirmation    Renal function panel    Lipid panel    Generalized anxiety disorder    Relevant Medications    busPIRone (Buspar) 15 mg tablet    sertraline (Zoloft) 50 mg tablet       Clinical Pharmacist follow-up: 3-6 mo, Telehealth visit    Continue all meds under the continuation of care with the referring provider and clinical pharmacy team.    Thank you,  Gail Mosquera, PharmD  Clinical Pharmacy Specialist     Verbal consent to manage patient's drug therapy was obtained from the patient. They were informed they may " decline to participate or withdraw from participation in pharmacy services at any time.

## 2024-12-12 ENCOUNTER — LAB (OUTPATIENT)
Dept: LAB | Facility: LAB | Age: 24
End: 2024-12-12
Payer: COMMERCIAL

## 2024-12-12 DIAGNOSIS — Z29.81 ENCOUNTER FOR PRE-EXPOSURE PROPHYLAXIS FOR HIV: ICD-10-CM

## 2024-12-12 LAB
ALBUMIN SERPL BCP-MCNC: 4.1 G/DL (ref 3.4–5)
ANION GAP SERPL CALC-SCNC: 12 MMOL/L (ref 10–20)
BUN SERPL-MCNC: 13 MG/DL (ref 6–23)
CALCIUM SERPL-MCNC: 8.8 MG/DL (ref 8.6–10.3)
CHLORIDE SERPL-SCNC: 105 MMOL/L (ref 98–107)
CHOLEST SERPL-MCNC: 177 MG/DL (ref 0–199)
CHOLESTEROL/HDL RATIO: 3.9
CO2 SERPL-SCNC: 26 MMOL/L (ref 21–32)
CREAT SERPL-MCNC: 0.9 MG/DL (ref 0.5–1.3)
EGFRCR SERPLBLD CKD-EPI 2021: >90 ML/MIN/1.73M*2
GLUCOSE SERPL-MCNC: 91 MG/DL (ref 74–99)
HDLC SERPL-MCNC: 45.7 MG/DL
HIV 1+2 AB+HIV1 P24 AG SERPL QL IA: NONREACTIVE
LDLC SERPL CALC-MCNC: 120 MG/DL
NON HDL CHOLESTEROL: 131 MG/DL (ref 0–149)
PHOSPHATE SERPL-MCNC: 4 MG/DL (ref 2.5–4.9)
POTASSIUM SERPL-SCNC: 4.3 MMOL/L (ref 3.5–5.3)
SODIUM SERPL-SCNC: 139 MMOL/L (ref 136–145)
TRIGL SERPL-MCNC: 57 MG/DL (ref 0–114)
VLDL: 11 MG/DL (ref 0–40)

## 2024-12-12 PROCEDURE — 87389 HIV-1 AG W/HIV-1&-2 AB AG IA: CPT

## 2024-12-12 PROCEDURE — 80069 RENAL FUNCTION PANEL: CPT

## 2024-12-12 PROCEDURE — 36415 COLL VENOUS BLD VENIPUNCTURE: CPT

## 2024-12-12 PROCEDURE — 80061 LIPID PANEL: CPT

## 2025-01-08 ENCOUNTER — OFFICE VISIT (OUTPATIENT)
Dept: URGENT CARE | Age: 25
End: 2025-01-08
Payer: COMMERCIAL

## 2025-01-08 VITALS
DIASTOLIC BLOOD PRESSURE: 92 MMHG | OXYGEN SATURATION: 99 % | SYSTOLIC BLOOD PRESSURE: 127 MMHG | TEMPERATURE: 98.9 F | HEART RATE: 112 BPM

## 2025-01-08 DIAGNOSIS — J02.9 SORE THROAT: Primary | ICD-10-CM

## 2025-01-08 DIAGNOSIS — J22 ACUTE LOWER RESPIRATORY TRACT INFECTION: ICD-10-CM

## 2025-01-08 DIAGNOSIS — R05.1 ACUTE COUGH: ICD-10-CM

## 2025-01-08 LAB
POC BINAX EXPIRATION: NORMAL
POC RAPID INFLUENZA A: NEGATIVE
POC RAPID INFLUENZA B: NEGATIVE
POC RAPID STREP: NEGATIVE
POC SARS-COV-2 AG BINAX: NORMAL

## 2025-01-08 PROCEDURE — 1036F TOBACCO NON-USER: CPT | Performed by: PHYSICIAN ASSISTANT

## 2025-01-08 PROCEDURE — 87811 SARS-COV-2 COVID19 W/OPTIC: CPT | Performed by: PHYSICIAN ASSISTANT

## 2025-01-08 PROCEDURE — 87804 INFLUENZA ASSAY W/OPTIC: CPT | Performed by: PHYSICIAN ASSISTANT

## 2025-01-08 PROCEDURE — 87880 STREP A ASSAY W/OPTIC: CPT | Performed by: PHYSICIAN ASSISTANT

## 2025-01-08 PROCEDURE — 3080F DIAST BP >= 90 MM HG: CPT | Performed by: PHYSICIAN ASSISTANT

## 2025-01-08 PROCEDURE — 99204 OFFICE O/P NEW MOD 45 MIN: CPT | Performed by: PHYSICIAN ASSISTANT

## 2025-01-08 PROCEDURE — 3074F SYST BP LT 130 MM HG: CPT | Performed by: PHYSICIAN ASSISTANT

## 2025-01-08 PROCEDURE — 94640 AIRWAY INHALATION TREATMENT: CPT | Performed by: PHYSICIAN ASSISTANT

## 2025-01-08 RX ORDER — AZITHROMYCIN 250 MG/1
TABLET, FILM COATED ORAL
Qty: 6 TABLET | Refills: 0 | Status: SHIPPED | OUTPATIENT
Start: 2025-01-08 | End: 2025-01-13

## 2025-01-08 RX ORDER — IPRATROPIUM BROMIDE AND ALBUTEROL SULFATE 2.5; .5 MG/3ML; MG/3ML
3 SOLUTION RESPIRATORY (INHALATION) ONCE
Status: COMPLETED | OUTPATIENT
Start: 2025-01-08 | End: 2025-01-08

## 2025-01-08 RX ORDER — BENZONATATE 100 MG/1
100 CAPSULE ORAL 3 TIMES DAILY PRN
Qty: 42 CAPSULE | Refills: 0 | Status: SHIPPED | OUTPATIENT
Start: 2025-01-08 | End: 2025-02-07

## 2025-01-08 RX ORDER — METHYLPREDNISOLONE 4 MG/1
TABLET ORAL
Qty: 21 TABLET | Refills: 0 | Status: SHIPPED | OUTPATIENT
Start: 2025-01-08 | End: 2025-01-14

## 2025-01-08 RX ORDER — ALBUTEROL SULFATE 90 UG/1
2 INHALANT RESPIRATORY (INHALATION) EVERY 4 HOURS PRN
Qty: 6.7 G | Refills: 0 | Status: SHIPPED | OUTPATIENT
Start: 2025-01-08 | End: 2026-01-08

## 2025-01-08 RX ADMIN — IPRATROPIUM BROMIDE AND ALBUTEROL SULFATE 3 ML: 2.5; .5 SOLUTION RESPIRATORY (INHALATION) at 10:01

## 2025-01-08 ASSESSMENT — ENCOUNTER SYMPTOMS
WHEEZING: 0
FEVER: 1
SORE THROAT: 1
CHEST TIGHTNESS: 1
FATIGUE: 1
COUGH: 1

## 2025-01-08 NOTE — PROGRESS NOTES
"Subjective   Patient ID: Dena Rivero \"Robert\" is a 24 y.o. adult. They present today with a chief complaint of Sore Throat and Cough (Sweats day/night).    History of Present Illness  Has had a cough for 2 weeks.  Cough is occasionally productive.  Denies chest pain endorses some tightness in his chest and shortness of breath at times.  Has had a sore throat nasal congestion headache fevers chills body aches.      History provided by:  Patient   used: No    Sore Throat   Associated symptoms include congestion and coughing.   Cough  Associated symptoms include a fever and a sore throat. Pertinent negatives include no wheezing.       Past Medical History  Allergies as of 01/08/2025    (No Known Allergies)       (Not in a hospital admission)       Past Medical History:   Diagnosis Date    Allergic     Anemia     Headache     Hypertension     Mild anemia 04/14/2023    Personal history of diseases of the blood and blood-forming organs and certain disorders involving the immune mechanism     History of sickle cell trait    Personal history of diseases of the skin and subcutaneous tissue 03/07/2017    History of keloid of skin    Personal history of other diseases of urinary system 08/22/2016    History of hematuria    Tonsillitis 12/07/2023       History reviewed. No pertinent surgical history.     reports that Lad has never smoked. Lad has never used smokeless tobacco. Lad reports that Lad does not currently use alcohol. Lad reports that Lad does not use drugs.    Review of Systems  Review of Systems   Constitutional:  Positive for fatigue and fever.   HENT:  Positive for congestion and sore throat.    Respiratory:  Positive for cough and chest tightness. Negative for wheezing.                                   Objective    Vitals:    01/08/25 0933   BP: (!) 127/92   Pulse: (!) 112   Temp: 37.2 °C (98.9 °F)   SpO2: 99%     No LMP recorded.    Physical Exam  Vitals (Tachycardic.  Blood pressure " slightly elevated) and nursing note reviewed.   Constitutional:       Appearance: Lad is obese.      Comments: Pleasant cooperative 24-year-old in no acute stress   HENT:      Head: Normocephalic and atraumatic.      Right Ear: Tympanic membrane, ear canal and external ear normal.      Left Ear: Tympanic membrane, ear canal and external ear normal.      Nose: Congestion present.      Mouth/Throat:      Mouth: Mucous membranes are moist.   Eyes:      Extraocular Movements: Extraocular movements intact.      Conjunctiva/sclera: Conjunctivae normal.      Pupils: Pupils are equal, round, and reactive to light.   Cardiovascular:      Rate and Rhythm: Tachycardia present.      Pulses: Normal pulses.   Pulmonary:      Effort: Pulmonary effort is normal. No respiratory distress.      Comments: Breath sounds diminished laterally although this may in part be due to body habitus  Musculoskeletal:      Cervical back: Normal range of motion and neck supple.   Skin:     General: Skin is warm and dry.   Neurological:      General: No focal deficit present.      Mental Status: Robert is oriented to person, place, and time.   Psychiatric:         Mood and Affect: Mood normal.         Behavior: Behavior normal.         Procedures    Point of Care Test & Imaging Results from this visit  No results found for this visit on 01/08/25.   No results found.    Diagnostic study results (if any) were reviewed by June Justin PA-C.    Assessment/Plan   Allergies, medications, history, and pertinent labs/EKGs/Imaging reviewed by June Justin PA-C.     Medical Decision Making flu and strep testing was negative here  History and physical nation are consistent with bronchitis.  Breath sounds improved after an albuterol treatment here.  His blood pressure and heart rate were somewhat elevated.  He denied taking any over-the-counter medications.  He was advised to monitor his vital signs and a room elevated seek treatment.  Will treat his  acute lower respiratory tract infection with Zithromax, we will give him Medrol Dosepak and inhaler and Tessalon Perles to help with his symptoms.  If he does not feel improved in a day or 2 he is encouraged to return to urgent care.  If his symptoms worsen especially if he develops severe or concerning symptoms such as shortness of breath chest pain etc. he will go to the emergency department.  He requested and was given a work use.    Orders and Diagnoses  Diagnoses and all orders for this visit:  Sore throat  -     POCT Covid-19 Rapid Antigen  -     POCT rapid strep A manually resulted  -     POCT Influenza A/B manually resulted  Acute lower respiratory tract infection  Acute cough      Medical Admin Record      Patient disposition: Home    Electronically signed by June Justin PA-C  9:46 AM

## 2025-01-08 NOTE — LETTER
January 8, 2025     Patient: Dena Rivero   YOB: 2000   Date of Visit: 1/8/2025       To Whom It May Concern:    Dena Rivero was seen in my clinic on 1/8/2025 at 9:45 am. Please excuse Dena for Lad's absence from work on this day to make the appointment. Dena may return to work on 1/10/2025 due to his illness.    If you have any questions or concerns, please don't hesitate to call.         Sincerely,         June Justin PA-C        CC: No Recipients

## 2025-01-24 ENCOUNTER — TELEPHONE (OUTPATIENT)
Dept: PRIMARY CARE | Facility: CLINIC | Age: 25
End: 2025-01-24
Payer: COMMERCIAL

## 2025-01-24 DIAGNOSIS — J02.9 PHARYNGITIS, UNSPECIFIED ETIOLOGY: Primary | ICD-10-CM

## 2025-01-24 DIAGNOSIS — R05.1 ACUTE COUGH: ICD-10-CM

## 2025-01-24 DIAGNOSIS — K62.89 RECTAL IRRITATION: ICD-10-CM

## 2025-01-24 RX ORDER — TRIAMCINOLONE ACETONIDE 1 MG/G
CREAM TOPICAL 2 TIMES DAILY
Qty: 30 G | Refills: 0 | Status: ON HOLD | OUTPATIENT
Start: 2025-01-24 | End: 2025-02-01 | Stop reason: ALTCHOICE

## 2025-01-24 RX ORDER — PROMETHAZINE HYDROCHLORIDE AND DEXTROMETHORPHAN HYDROBROMIDE 6.25; 15 MG/5ML; MG/5ML
5 SYRUP ORAL EVERY 4 HOURS PRN
Qty: 120 ML | Refills: 0 | Status: ON HOLD | OUTPATIENT
Start: 2025-01-24 | End: 2025-02-23

## 2025-01-24 RX ORDER — AZITHROMYCIN 250 MG/1
TABLET, FILM COATED ORAL
Qty: 6 TABLET | Refills: 0 | Status: ON HOLD | OUTPATIENT
Start: 2025-01-24 | End: 2025-02-01 | Stop reason: ALTCHOICE

## 2025-01-24 NOTE — TELEPHONE ENCOUNTER
The patient called today to state they had called earlier in the week but there were no messages in their chart.     Lad was in urgent care on 1/8/2025 for sore throat and cough.    They were given:    Albuterol ( Proventil HFA) 90mcg/actuation inhaler   Benzonatate (Tessalon) 100mg capsule     The patient now has additional symptoms of the chills and diarrhea, along with still having cough and sore throat.     They stated that an OTC steroid cream is not helping the rectum and surrounding area which is irritated.     They are requesting a steroid cream, cough medicine and antibiotics if possible.     Pharmacy: [facility]    Last office visit: 6/24/2024

## 2025-01-30 ENCOUNTER — OFFICE VISIT (OUTPATIENT)
Dept: PRIMARY CARE | Facility: CLINIC | Age: 25
End: 2025-01-30
Payer: COMMERCIAL

## 2025-01-30 VITALS
DIASTOLIC BLOOD PRESSURE: 82 MMHG | OXYGEN SATURATION: 97 % | HEART RATE: 107 BPM | SYSTOLIC BLOOD PRESSURE: 146 MMHG | TEMPERATURE: 97.3 F | WEIGHT: 315 LBS | BODY MASS INDEX: 42.81 KG/M2

## 2025-01-30 DIAGNOSIS — R59.9 LYMPH NODE ENLARGEMENT: ICD-10-CM

## 2025-01-30 DIAGNOSIS — R74.01 TRANSAMINITIS: ICD-10-CM

## 2025-01-30 DIAGNOSIS — M79.10 MYALGIA: Primary | ICD-10-CM

## 2025-01-30 PROCEDURE — 3077F SYST BP >= 140 MM HG: CPT | Performed by: NURSE PRACTITIONER

## 2025-01-30 PROCEDURE — 1036F TOBACCO NON-USER: CPT | Performed by: NURSE PRACTITIONER

## 2025-01-30 PROCEDURE — 99213 OFFICE O/P EST LOW 20 MIN: CPT | Performed by: NURSE PRACTITIONER

## 2025-01-30 PROCEDURE — 3079F DIAST BP 80-89 MM HG: CPT | Performed by: NURSE PRACTITIONER

## 2025-01-30 RX ORDER — METHOCARBAMOL 500 MG/1
500 TABLET, FILM COATED ORAL 4 TIMES DAILY PRN
Qty: 40 TABLET | Refills: 0 | Status: ON HOLD | OUTPATIENT
Start: 2025-01-30 | End: 2025-03-31

## 2025-01-30 ASSESSMENT — ENCOUNTER SYMPTOMS
NERVOUS/ANXIOUS: 0
WEAKNESS: 0
VOMITING: 0
CONSTIPATION: 0
CHILLS: 0
CONFUSION: 0
DIZZINESS: 0
COUGH: 0
FATIGUE: 1
PALPITATIONS: 0
HEADACHES: 0
MYALGIAS: 1
ACTIVITY CHANGE: 0
NAUSEA: 0
FEVER: 0
ABDOMINAL PAIN: 0
SHORTNESS OF BREATH: 0
DIARRHEA: 0
APNEA: 0
RESPIRATORY NEGATIVE: 1

## 2025-01-30 NOTE — ASSESSMENT & PLAN NOTE
Trial off Truvada  Recommend safe sex practices  Robaxin 500 mg 4 times daily as needed.  Risk/benefits/side effects discussed  Recheck liver enzymes, add lipase and amylase  Ultrasound liver

## 2025-01-30 NOTE — PROGRESS NOTES
Subjective   Patient ID: Robert Rivero is a 24 y.o. adult who presents for myalgia and Elevated Hepatic Enzymes.    Patient seen in urgent care on 1/8/2025 due to cough, sweats and sore throat.  Negative COVID, influenza A&B and strep.  Patient was treated for acute lower respiratory tract infection with Zithromax, Medrol Dosepak, albuterol, Tessalon Perles.  Respiratory symptoms improved however patient returned to ED for eval on yesterday 1/29/25 d/t myalgias and lymph node swelling. During ED visit patient had unremarkable chest x-ray, negative Monospot test.  Normal CBC with differential.  Today patient also reports he feels fatigue and is developing a rash on upper extremities.  Patient is on Truvada (prophylaxis) Negative Rapid HIV 1/29/25.  Discussed side effect profile as well as noted elevated liver enzymes.  Patient also reports that he feels some abdominal pain         Review of Systems   Constitutional:  Positive for fatigue. Negative for activity change, chills and fever.   Respiratory: Negative.  Negative for apnea, cough and shortness of breath.    Cardiovascular:  Negative for chest pain and palpitations.   Gastrointestinal:  Negative for abdominal pain, constipation, diarrhea, nausea and vomiting.   Musculoskeletal:  Positive for myalgias.   Neurological:  Negative for dizziness, weakness and headaches.   Psychiatric/Behavioral:  Negative for confusion. The patient is not nervous/anxious.        Objective   /82   Pulse 107   Temp 36.3 °C (97.3 °F) (Temporal)   Wt (!) 164 kg (361 lb)   SpO2 97%   BMI 42.81 kg/m²     Physical Exam  Vitals reviewed.   Constitutional:       Appearance: Normal appearance.   HENT:      Head: Normocephalic.   Cardiovascular:      Rate and Rhythm: Normal rate and regular rhythm.      Pulses: Normal pulses.      Heart sounds: Normal heart sounds.   Pulmonary:      Effort: Pulmonary effort is normal. No respiratory distress.      Breath sounds: Normal breath sounds.  No wheezing.   Abdominal:      General: Bowel sounds are normal.   Lymphadenopathy:      Head:      Right side of head: Occipital adenopathy present.      Left side of head: Occipital adenopathy present.   Neurological:      General: No focal deficit present.      Mental Status: Lad is alert and oriented to person, place, and time.   Psychiatric:         Mood and Affect: Mood normal.         Behavior: Behavior normal.         Assessment/Plan   Problem List Items Addressed This Visit             ICD-10-CM    Myalgia - Primary M79.10     Trial off Truvada  Recommend safe sex practices  Robaxin 500 mg 4 times daily as needed.  Risk/benefits/side effects discussed  Recheck liver enzymes, add lipase and amylase  Ultrasound liver         Relevant Medications    methocarbamol (Robaxin) 500 mg tablet    Other Relevant Orders    HIV 1/2 Antigen/Antibody Screen with Reflex to Confirmation    Creatine Kinase    Transaminitis R74.01     Recheck liver enzymes, lipase and amylase  Ultrasound of liver         Relevant Orders    Hepatic function panel    Lipase    Amylase    US abdomen limited liver    Hepatitis C Antibody    Lymph node enlargement R59.9     CBC unremarkable           Relevant Orders    HIV 1/2 Antigen/Antibody Screen with Reflex to Confirmation

## 2025-01-31 ENCOUNTER — HOSPITAL ENCOUNTER (OUTPATIENT)
Facility: HOSPITAL | Age: 25
Setting detail: OBSERVATION
DRG: 442 | End: 2025-01-31
Attending: EMERGENCY MEDICINE | Admitting: INTERNAL MEDICINE
Payer: COMMERCIAL

## 2025-01-31 ENCOUNTER — APPOINTMENT (OUTPATIENT)
Dept: RADIOLOGY | Facility: HOSPITAL | Age: 25
DRG: 442 | End: 2025-01-31
Payer: COMMERCIAL

## 2025-01-31 ENCOUNTER — APPOINTMENT (OUTPATIENT)
Dept: CARDIOLOGY | Facility: HOSPITAL | Age: 25
DRG: 442 | End: 2025-01-31
Payer: COMMERCIAL

## 2025-01-31 DIAGNOSIS — K21.9 GASTROESOPHAGEAL REFLUX DISEASE WITHOUT ESOPHAGITIS: ICD-10-CM

## 2025-01-31 DIAGNOSIS — R79.89 ELEVATED LFTS: ICD-10-CM

## 2025-01-31 DIAGNOSIS — R10.9 ABDOMINAL PAIN, UNSPECIFIED ABDOMINAL LOCATION: ICD-10-CM

## 2025-01-31 DIAGNOSIS — R59.9 LYMPH NODE ENLARGEMENT: ICD-10-CM

## 2025-01-31 DIAGNOSIS — R74.8 ELEVATED LIPASE: Primary | ICD-10-CM

## 2025-01-31 DIAGNOSIS — I10 BENIGN ESSENTIAL HYPERTENSION: ICD-10-CM

## 2025-01-31 LAB
ALBUMIN SERPL BCP-MCNC: 3.7 G/DL (ref 3.4–5)
ALBUMIN SERPL-MCNC: 3.8 G/DL (ref 3.6–5.1)
ALBUMIN/GLOB SERPL: 0.8 (CALC) (ref 1–2.5)
ALP SERPL-CCNC: 135 U/L (ref 36–130)
ALP SERPL-CCNC: 151 U/L (ref 33–120)
ALT SERPL W P-5'-P-CCNC: 354 U/L (ref 7–52)
ALT SERPL-CCNC: 317 U/L (ref 9–46)
AMPHETAMINES UR QL SCN: ABNORMAL
AMYLASE SERPL-CCNC: 95 U/L (ref 21–101)
ANION GAP SERPL CALC-SCNC: 10 MMOL/L (ref 10–20)
APPEARANCE UR: CLEAR
APTT PPP: 43 SECONDS (ref 27–38)
AST SERPL W P-5'-P-CCNC: 181 U/L (ref 9–39)
AST SERPL-CCNC: 179 U/L (ref 10–40)
BARBITURATES UR QL SCN: ABNORMAL
BASOPHILS # BLD AUTO: 0.03 X10*3/UL (ref 0–0.1)
BASOPHILS NFR BLD AUTO: 0.5 %
BENZODIAZ UR QL SCN: ABNORMAL
BILIRUB DIRECT SERPL-MCNC: 0.2 MG/DL
BILIRUB INDIRECT SERPL-MCNC: 0.5 MG/DL (CALC) (ref 0.2–1.2)
BILIRUB SERPL-MCNC: 0.6 MG/DL (ref 0–1.2)
BILIRUB SERPL-MCNC: 0.7 MG/DL (ref 0.2–1.2)
BILIRUB UR STRIP.AUTO-MCNC: NEGATIVE MG/DL
BUN SERPL-MCNC: 8 MG/DL (ref 6–23)
BZE UR QL SCN: ABNORMAL
CALCIUM SERPL-MCNC: 9 MG/DL (ref 8.6–10.3)
CANNABINOIDS UR QL SCN: ABNORMAL
CARDIAC TROPONIN I PNL SERPL HS: 3 NG/L (ref 0–20)
CHLORIDE SERPL-SCNC: 104 MMOL/L (ref 98–107)
CK SERPL-CCNC: 108 U/L (ref 26–366)
CO2 SERPL-SCNC: 25 MMOL/L (ref 21–32)
COLOR UR: YELLOW
CREAT SERPL-MCNC: 0.78 MG/DL (ref 0.5–1.3)
EGFRCR SERPLBLD CKD-EPI 2021: >90 ML/MIN/1.73M*2
EOSINOPHIL # BLD AUTO: 0.1 X10*3/UL (ref 0–0.7)
EOSINOPHIL NFR BLD AUTO: 1.5 %
ERYTHROCYTE [DISTWIDTH] IN BLOOD BY AUTOMATED COUNT: 11.9 % (ref 11.5–14.5)
FENTANYL+NORFENTANYL UR QL SCN: ABNORMAL
GLOBULIN SER CALC-MCNC: 4.8 G/DL (CALC) (ref 1.9–3.7)
GLUCOSE SERPL-MCNC: 94 MG/DL (ref 74–99)
GLUCOSE UR STRIP.AUTO-MCNC: NORMAL MG/DL
HCT VFR BLD AUTO: 38 % (ref 36–52)
HGB BLD-MCNC: 12.4 G/DL (ref 12–17.5)
HIV 1+2 AB+HIV1 P24 AG SERPL QL IA: NORMAL
IMM GRANULOCYTES # BLD AUTO: 0.02 X10*3/UL (ref 0–0.7)
IMM GRANULOCYTES NFR BLD AUTO: 0.3 % (ref 0–0.9)
INR PPP: 1.2 (ref 0.9–1.1)
KETONES UR STRIP.AUTO-MCNC: NEGATIVE MG/DL
LACTATE SERPL-SCNC: 1 MMOL/L (ref 0.4–2)
LEUKOCYTE ESTERASE UR QL STRIP.AUTO: NEGATIVE
LIPASE SERPL-CCNC: 188 U/L (ref 9–82)
LIPASE SERPL-CCNC: 88 U/L (ref 7–60)
LYMPHOCYTES # BLD AUTO: 1.16 X10*3/UL (ref 1.2–4.8)
LYMPHOCYTES NFR BLD AUTO: 17.6 %
MCH RBC QN AUTO: 26.6 PG (ref 26–34)
MCHC RBC AUTO-ENTMCNC: 32.6 G/DL (ref 32–36)
MCV RBC AUTO: 81 FL (ref 80–100)
METHADONE UR QL SCN: ABNORMAL
MONOCYTES # BLD AUTO: 0.74 X10*3/UL (ref 0.1–1)
MONOCYTES NFR BLD AUTO: 11.2 %
NEUTROPHILS # BLD AUTO: 4.55 X10*3/UL (ref 1.2–7.7)
NEUTROPHILS NFR BLD AUTO: 68.9 %
NITRITE UR QL STRIP.AUTO: NEGATIVE
NRBC BLD-RTO: 0 /100 WBCS (ref 0–0)
OPIATES UR QL SCN: ABNORMAL
OXYCODONE+OXYMORPHONE UR QL SCN: ABNORMAL
PCP UR QL SCN: ABNORMAL
PH UR STRIP.AUTO: 5.5 [PH]
PLATELET # BLD AUTO: 250 X10*3/UL (ref 150–450)
POTASSIUM SERPL-SCNC: 4.1 MMOL/L (ref 3.5–5.3)
PROT SERPL-MCNC: 8.6 G/DL (ref 6.1–8.1)
PROT SERPL-MCNC: 8.8 G/DL (ref 6.4–8.2)
PROT UR STRIP.AUTO-MCNC: NEGATIVE MG/DL
PROTHROMBIN TIME: 13.6 SECONDS (ref 9.8–12.8)
RBC # BLD AUTO: 4.67 X10*6/UL (ref 4–5.9)
RBC # UR STRIP.AUTO: NEGATIVE /UL
SODIUM SERPL-SCNC: 135 MMOL/L (ref 136–145)
SP GR UR STRIP.AUTO: 1.02
UROBILINOGEN UR STRIP.AUTO-MCNC: ABNORMAL MG/DL
WBC # BLD AUTO: 6.6 X10*3/UL (ref 4.4–11.3)

## 2025-01-31 PROCEDURE — 99285 EMERGENCY DEPT VISIT HI MDM: CPT | Mod: 25 | Performed by: EMERGENCY MEDICINE

## 2025-01-31 PROCEDURE — 80307 DRUG TEST PRSMV CHEM ANLYZR: CPT | Performed by: PHYSICIAN ASSISTANT

## 2025-01-31 PROCEDURE — 85610 PROTHROMBIN TIME: CPT | Performed by: PHYSICIAN ASSISTANT

## 2025-01-31 PROCEDURE — 82390 ASSAY OF CERULOPLASMIN: CPT | Mod: PORLAB | Performed by: INTERNAL MEDICINE

## 2025-01-31 PROCEDURE — 96376 TX/PRO/DX INJ SAME DRUG ADON: CPT

## 2025-01-31 PROCEDURE — 36415 COLL VENOUS BLD VENIPUNCTURE: CPT | Performed by: PHYSICIAN ASSISTANT

## 2025-01-31 PROCEDURE — 85025 COMPLETE CBC W/AUTO DIFF WBC: CPT | Performed by: PHYSICIAN ASSISTANT

## 2025-01-31 PROCEDURE — 86381 MITOCHONDRIAL ANTIBODY EACH: CPT | Mod: PORLAB | Performed by: INTERNAL MEDICINE

## 2025-01-31 PROCEDURE — 99222 1ST HOSP IP/OBS MODERATE 55: CPT | Performed by: INTERNAL MEDICINE

## 2025-01-31 PROCEDURE — 80074 ACUTE HEPATITIS PANEL: CPT | Mod: PORLAB | Performed by: PHYSICIAN ASSISTANT

## 2025-01-31 PROCEDURE — 74177 CT ABD & PELVIS W/CONTRAST: CPT

## 2025-01-31 PROCEDURE — 82784 ASSAY IGA/IGD/IGG/IGM EACH: CPT | Mod: PORLAB | Performed by: INTERNAL MEDICINE

## 2025-01-31 PROCEDURE — 84484 ASSAY OF TROPONIN QUANT: CPT | Performed by: PHYSICIAN ASSISTANT

## 2025-01-31 PROCEDURE — 82104 ALPHA-1-ANTITRYPSIN PHENO: CPT | Mod: PORLAB | Performed by: INTERNAL MEDICINE

## 2025-01-31 PROCEDURE — 86695 HERPES SIMPLEX TYPE 1 TEST: CPT | Mod: PORLAB | Performed by: INTERNAL MEDICINE

## 2025-01-31 PROCEDURE — 74177 CT ABD & PELVIS W/CONTRAST: CPT | Performed by: RADIOLOGY

## 2025-01-31 PROCEDURE — 86376 MICROSOMAL ANTIBODY EACH: CPT | Mod: PORLAB | Performed by: INTERNAL MEDICINE

## 2025-01-31 PROCEDURE — 76705 ECHO EXAM OF ABDOMEN: CPT | Mod: FOREIGN READ | Performed by: RADIOLOGY

## 2025-01-31 PROCEDURE — 96374 THER/PROPH/DIAG INJ IV PUSH: CPT

## 2025-01-31 PROCEDURE — 83516 IMMUNOASSAY NONANTIBODY: CPT | Mod: PORLAB | Performed by: INTERNAL MEDICINE

## 2025-01-31 PROCEDURE — 2550000001 HC RX 255 CONTRASTS: Performed by: PHYSICIAN ASSISTANT

## 2025-01-31 PROCEDURE — 83690 ASSAY OF LIPASE: CPT | Performed by: PHYSICIAN ASSISTANT

## 2025-01-31 PROCEDURE — 86706 HEP B SURFACE ANTIBODY: CPT | Mod: PORLAB | Performed by: INTERNAL MEDICINE

## 2025-01-31 PROCEDURE — 86015 ACTIN ANTIBODY EACH: CPT | Mod: PORLAB | Performed by: INTERNAL MEDICINE

## 2025-01-31 PROCEDURE — 80053 COMPREHEN METABOLIC PANEL: CPT | Performed by: PHYSICIAN ASSISTANT

## 2025-01-31 PROCEDURE — 2500000004 HC RX 250 GENERAL PHARMACY W/ HCPCS (ALT 636 FOR OP/ED): Performed by: PHYSICIAN ASSISTANT

## 2025-01-31 PROCEDURE — 76705 ECHO EXAM OF ABDOMEN: CPT

## 2025-01-31 PROCEDURE — 86235 NUCLEAR ANTIGEN ANTIBODY: CPT | Performed by: INTERNAL MEDICINE

## 2025-01-31 PROCEDURE — 86704 HEP B CORE ANTIBODY TOTAL: CPT | Mod: PORLAB | Performed by: INTERNAL MEDICINE

## 2025-01-31 PROCEDURE — 96375 TX/PRO/DX INJ NEW DRUG ADDON: CPT

## 2025-01-31 PROCEDURE — 81003 URINALYSIS AUTO W/O SCOPE: CPT | Performed by: PHYSICIAN ASSISTANT

## 2025-01-31 PROCEDURE — 83605 ASSAY OF LACTIC ACID: CPT | Performed by: PHYSICIAN ASSISTANT

## 2025-01-31 PROCEDURE — 71046 X-RAY EXAM CHEST 2 VIEWS: CPT | Performed by: STUDENT IN AN ORGANIZED HEALTH CARE EDUCATION/TRAINING PROGRAM

## 2025-01-31 PROCEDURE — 36415 COLL VENOUS BLD VENIPUNCTURE: CPT | Performed by: INTERNAL MEDICINE

## 2025-01-31 PROCEDURE — 93005 ELECTROCARDIOGRAM TRACING: CPT

## 2025-01-31 PROCEDURE — 86038 ANTINUCLEAR ANTIBODIES: CPT | Mod: PORLAB | Performed by: INTERNAL MEDICINE

## 2025-01-31 PROCEDURE — 71046 X-RAY EXAM CHEST 2 VIEWS: CPT

## 2025-01-31 PROCEDURE — 85730 THROMBOPLASTIN TIME PARTIAL: CPT | Performed by: PHYSICIAN ASSISTANT

## 2025-01-31 RX ORDER — ONDANSETRON HYDROCHLORIDE 2 MG/ML
4 INJECTION, SOLUTION INTRAVENOUS EVERY 8 HOURS PRN
Status: DISCONTINUED | OUTPATIENT
Start: 2025-01-31 | End: 2025-02-04 | Stop reason: HOSPADM

## 2025-01-31 RX ORDER — ACETAMINOPHEN 160 MG/5ML
650 SOLUTION ORAL EVERY 4 HOURS PRN
Status: DISCONTINUED | OUTPATIENT
Start: 2025-01-31 | End: 2025-02-04 | Stop reason: HOSPADM

## 2025-01-31 RX ORDER — DIPHENHYDRAMINE HYDROCHLORIDE 50 MG/ML
25 INJECTION INTRAMUSCULAR; INTRAVENOUS ONCE
Status: COMPLETED | OUTPATIENT
Start: 2025-01-31 | End: 2025-01-31

## 2025-01-31 RX ORDER — ALUMINUM HYDROXIDE, MAGNESIUM HYDROXIDE, AND SIMETHICONE 1200; 120; 1200 MG/30ML; MG/30ML; MG/30ML
30 SUSPENSION ORAL EVERY 6 HOURS PRN
Status: DISCONTINUED | OUTPATIENT
Start: 2025-01-31 | End: 2025-02-04 | Stop reason: HOSPADM

## 2025-01-31 RX ORDER — POLYETHYLENE GLYCOL 3350 17 G/17G
17 POWDER, FOR SOLUTION ORAL 2 TIMES DAILY PRN
Status: DISCONTINUED | OUTPATIENT
Start: 2025-01-31 | End: 2025-02-04 | Stop reason: HOSPADM

## 2025-01-31 RX ORDER — ACETAMINOPHEN 650 MG/1
650 SUPPOSITORY RECTAL EVERY 4 HOURS PRN
Status: DISCONTINUED | OUTPATIENT
Start: 2025-01-31 | End: 2025-02-04 | Stop reason: HOSPADM

## 2025-01-31 RX ORDER — MORPHINE SULFATE 4 MG/ML
4 INJECTION INTRAVENOUS ONCE
Status: COMPLETED | OUTPATIENT
Start: 2025-01-31 | End: 2025-01-31

## 2025-01-31 RX ORDER — ONDANSETRON 4 MG/1
4 TABLET, FILM COATED ORAL EVERY 8 HOURS PRN
Status: DISCONTINUED | OUTPATIENT
Start: 2025-01-31 | End: 2025-02-04 | Stop reason: HOSPADM

## 2025-01-31 RX ORDER — ACETAMINOPHEN 325 MG/1
650 TABLET ORAL EVERY 4 HOURS PRN
Status: DISCONTINUED | OUTPATIENT
Start: 2025-01-31 | End: 2025-02-04 | Stop reason: HOSPADM

## 2025-01-31 RX ORDER — ONDANSETRON HYDROCHLORIDE 2 MG/ML
4 INJECTION, SOLUTION INTRAVENOUS ONCE
Status: COMPLETED | OUTPATIENT
Start: 2025-01-31 | End: 2025-01-31

## 2025-01-31 RX ORDER — GUAIFENESIN/DEXTROMETHORPHAN 100-10MG/5
5 SYRUP ORAL EVERY 4 HOURS PRN
Status: DISCONTINUED | OUTPATIENT
Start: 2025-01-31 | End: 2025-02-04 | Stop reason: HOSPADM

## 2025-01-31 RX ORDER — GUAIFENESIN 600 MG/1
600 TABLET, EXTENDED RELEASE ORAL EVERY 12 HOURS PRN
Status: DISCONTINUED | OUTPATIENT
Start: 2025-01-31 | End: 2025-02-04 | Stop reason: HOSPADM

## 2025-01-31 RX ORDER — TALC
3 POWDER (GRAM) TOPICAL NIGHTLY PRN
Status: DISCONTINUED | OUTPATIENT
Start: 2025-01-31 | End: 2025-02-04 | Stop reason: HOSPADM

## 2025-01-31 RX ADMIN — MORPHINE SULFATE 4 MG: 4 INJECTION, SOLUTION INTRAMUSCULAR; INTRAVENOUS at 19:14

## 2025-01-31 RX ADMIN — DIPHENHYDRAMINE HYDROCHLORIDE 25 MG: 50 INJECTION, SOLUTION INTRAMUSCULAR; INTRAVENOUS at 19:14

## 2025-01-31 RX ADMIN — IOHEXOL 75 ML: 350 INJECTION, SOLUTION INTRAVENOUS at 21:13

## 2025-01-31 RX ADMIN — ONDANSETRON 4 MG: 2 INJECTION INTRAMUSCULAR; INTRAVENOUS at 19:15

## 2025-01-31 RX ADMIN — MORPHINE SULFATE 4 MG: 4 INJECTION, SOLUTION INTRAMUSCULAR; INTRAVENOUS at 22:53

## 2025-01-31 ASSESSMENT — PAIN - FUNCTIONAL ASSESSMENT: PAIN_FUNCTIONAL_ASSESSMENT: 0-10

## 2025-01-31 ASSESSMENT — PAIN SCALES - GENERAL
PAINLEVEL_OUTOF10: 7
PAINLEVEL_OUTOF10: 7

## 2025-01-31 NOTE — TELEPHONE ENCOUNTER
Patient called in stating they are experiencing hives now, I did ask if they are having shortness of breath and they stated no also I notified them that you will not be back in until Monday.     Patient advised to go to ER if symptoms worsen

## 2025-01-31 NOTE — ED TRIAGE NOTES
Pt to ed via private vehicle from home c/o abd pain, CP, and hives X one week. Pt states was seen earlier this week, diagnosed with high liver enzymes but nothing else noted. Pt states CP is midsternal, abdominal pain is diffuse throughout abdomen. Pt denies N/V/D. Pt last BM this morning, states normal for patient. Denies trouble urinating. Pt states hard to take full breath but denies SOB.

## 2025-02-01 LAB
ALBUMIN SERPL BCP-MCNC: 3.3 G/DL (ref 3.4–5)
ALP SERPL-CCNC: 136 U/L (ref 33–120)
ALT SERPL W P-5'-P-CCNC: 291 U/L (ref 7–52)
ANION GAP SERPL CALC-SCNC: 11 MMOL/L (ref 10–20)
AST SERPL W P-5'-P-CCNC: 135 U/L (ref 9–39)
ATRIAL RATE: 99 BPM
BILIRUB DIRECT SERPL-MCNC: 0 MG/DL (ref 0–0.3)
BILIRUB SERPL-MCNC: 0.5 MG/DL (ref 0–1.2)
BUN SERPL-MCNC: 10 MG/DL (ref 6–23)
CALCIUM SERPL-MCNC: 8.6 MG/DL (ref 8.6–10.3)
CHLORIDE SERPL-SCNC: 104 MMOL/L (ref 98–107)
CO2 SERPL-SCNC: 27 MMOL/L (ref 21–32)
CREAT SERPL-MCNC: 0.79 MG/DL (ref 0.5–1.3)
EGFRCR SERPLBLD CKD-EPI 2021: >90 ML/MIN/1.73M*2
ERYTHROCYTE [DISTWIDTH] IN BLOOD BY AUTOMATED COUNT: 12.1 % (ref 11.5–14.5)
FERRITIN SERPL-MCNC: 193 NG/ML (ref 8–300)
GLUCOSE SERPL-MCNC: 82 MG/DL (ref 74–99)
HAV IGM SER QL: NONREACTIVE
HBV CORE AB SER QL: NONREACTIVE
HBV CORE IGM SER QL: NONREACTIVE
HBV SURFACE AB SER-ACNC: <3.1 MIU/ML
HBV SURFACE AG SERPL QL IA: NONREACTIVE
HCT VFR BLD AUTO: 35.7 % (ref 36–52)
HCV AB SER QL: NONREACTIVE
HERPES SIMPLEX VIRUS 1 IGG: 0.3 INDEX
HERPES SIMPLEX VIRUS 2 IGG: >8 INDEX
HGB BLD-MCNC: 11.7 G/DL (ref 12–17.5)
HOLD SPECIMEN: NORMAL
IRON SATN MFR SERPL: 22 % (ref 25–45)
IRON SERPL-MCNC: 50 UG/DL (ref 35–150)
MCH RBC QN AUTO: 27.5 PG (ref 26–34)
MCHC RBC AUTO-ENTMCNC: 32.8 G/DL (ref 32–36)
MCV RBC AUTO: 84 FL (ref 80–100)
NRBC BLD-RTO: 0 /100 WBCS (ref 0–0)
P AXIS: 44 DEGREES
PLATELET # BLD AUTO: 198 X10*3/UL (ref 150–450)
POTASSIUM SERPL-SCNC: 4.3 MMOL/L (ref 3.5–5.3)
PR INTERVAL: 141 MS
PROT SERPL-MCNC: 8.1 G/DL (ref 6.4–8.2)
Q ONSET: 251 MS
QRS COUNT: 16 BEATS
QRS DURATION: 83 MS
QT INTERVAL: 334 MS
QTC CALCULATION(BAZETT): 427 MS
QTC FREDERICIA: 393 MS
R AXIS: 39 DEGREES
RBC # BLD AUTO: 4.26 X10*6/UL (ref 4–5.9)
SODIUM SERPL-SCNC: 138 MMOL/L (ref 136–145)
T AXIS: 25 DEGREES
T OFFSET: 418 MS
TIBC SERPL-MCNC: 231 UG/DL (ref 240–445)
UIBC SERPL-MCNC: 181 UG/DL (ref 110–370)
VENTRICULAR RATE: 98 BPM
WBC # BLD AUTO: 5.9 X10*3/UL (ref 4.4–11.3)

## 2025-02-01 PROCEDURE — 99222 1ST HOSP IP/OBS MODERATE 55: CPT | Performed by: INTERNAL MEDICINE

## 2025-02-01 PROCEDURE — G0378 HOSPITAL OBSERVATION PER HR: HCPCS

## 2025-02-01 PROCEDURE — 83540 ASSAY OF IRON: CPT | Performed by: INTERNAL MEDICINE

## 2025-02-01 PROCEDURE — 86644 CMV ANTIBODY: CPT | Mod: PORLAB | Performed by: INTERNAL MEDICINE

## 2025-02-01 PROCEDURE — 84075 ASSAY ALKALINE PHOSPHATASE: CPT | Performed by: INTERNAL MEDICINE

## 2025-02-01 PROCEDURE — 80053 COMPREHEN METABOLIC PANEL: CPT | Performed by: INTERNAL MEDICINE

## 2025-02-01 PROCEDURE — 2500000001 HC RX 250 WO HCPCS SELF ADMINISTERED DRUGS (ALT 637 FOR MEDICARE OP): Performed by: INTERNAL MEDICINE

## 2025-02-01 PROCEDURE — 85027 COMPLETE CBC AUTOMATED: CPT | Performed by: INTERNAL MEDICINE

## 2025-02-01 PROCEDURE — 86708 HEPATITIS A ANTIBODY: CPT | Mod: PORLAB | Performed by: INTERNAL MEDICINE

## 2025-02-01 PROCEDURE — 82728 ASSAY OF FERRITIN: CPT | Performed by: INTERNAL MEDICINE

## 2025-02-01 PROCEDURE — 36415 COLL VENOUS BLD VENIPUNCTURE: CPT | Performed by: INTERNAL MEDICINE

## 2025-02-01 PROCEDURE — 99233 SBSQ HOSP IP/OBS HIGH 50: CPT | Performed by: FAMILY MEDICINE

## 2025-02-01 PROCEDURE — 86645 CMV ANTIBODY IGM: CPT | Performed by: INTERNAL MEDICINE

## 2025-02-01 RX ORDER — HYDROMORPHONE HYDROCHLORIDE 2 MG/1
2 TABLET ORAL EVERY 4 HOURS PRN
Status: DISCONTINUED | OUTPATIENT
Start: 2025-02-01 | End: 2025-02-02

## 2025-02-01 RX ORDER — DIPHENHYDRAMINE HYDROCHLORIDE 50 MG/ML
25 INJECTION INTRAMUSCULAR; INTRAVENOUS EVERY 6 HOURS PRN
Status: DISCONTINUED | OUTPATIENT
Start: 2025-02-01 | End: 2025-02-04 | Stop reason: HOSPADM

## 2025-02-01 RX ADMIN — ACETAMINOPHEN 650 MG: 325 TABLET ORAL at 20:32

## 2025-02-01 RX ADMIN — ACETAMINOPHEN 650 MG: 325 TABLET ORAL at 12:31

## 2025-02-01 RX ADMIN — HYDROMORPHONE HYDROCHLORIDE 2 MG: 2 TABLET ORAL at 23:01

## 2025-02-01 SDOH — ECONOMIC STABILITY: HOUSING INSECURITY: AT ANY TIME IN THE PAST 12 MONTHS, WERE YOU HOMELESS OR LIVING IN A SHELTER (INCLUDING NOW)?: NO

## 2025-02-01 SDOH — ECONOMIC STABILITY: FOOD INSECURITY: WITHIN THE PAST 12 MONTHS, THE FOOD YOU BOUGHT JUST DIDN'T LAST AND YOU DIDN'T HAVE MONEY TO GET MORE.: NEVER TRUE

## 2025-02-01 SDOH — ECONOMIC STABILITY: FOOD INSECURITY: HOW HARD IS IT FOR YOU TO PAY FOR THE VERY BASICS LIKE FOOD, HOUSING, MEDICAL CARE, AND HEATING?: NOT HARD AT ALL

## 2025-02-01 SDOH — SOCIAL STABILITY: SOCIAL INSECURITY: ARE THERE ANY APPARENT SIGNS OF INJURIES/BEHAVIORS THAT COULD BE RELATED TO ABUSE/NEGLECT?: NO

## 2025-02-01 SDOH — ECONOMIC STABILITY: HOUSING INSECURITY: IN THE LAST 12 MONTHS, WAS THERE A TIME WHEN YOU WERE NOT ABLE TO PAY THE MORTGAGE OR RENT ON TIME?: NO

## 2025-02-01 SDOH — SOCIAL STABILITY: SOCIAL INSECURITY: DO YOU FEEL ANYONE HAS EXPLOITED OR TAKEN ADVANTAGE OF YOU FINANCIALLY OR OF YOUR PERSONAL PROPERTY?: NO

## 2025-02-01 SDOH — SOCIAL STABILITY: SOCIAL INSECURITY: DOES ANYONE TRY TO KEEP YOU FROM HAVING/CONTACTING OTHER FRIENDS OR DOING THINGS OUTSIDE YOUR HOME?: NO

## 2025-02-01 SDOH — ECONOMIC STABILITY: INCOME INSECURITY: IN THE PAST 12 MONTHS HAS THE ELECTRIC, GAS, OIL, OR WATER COMPANY THREATENED TO SHUT OFF SERVICES IN YOUR HOME?: NO

## 2025-02-01 SDOH — SOCIAL STABILITY: SOCIAL INSECURITY: WITHIN THE LAST YEAR, HAVE YOU BEEN HUMILIATED OR EMOTIONALLY ABUSED IN OTHER WAYS BY YOUR PARTNER OR EX-PARTNER?: NO

## 2025-02-01 SDOH — SOCIAL STABILITY: SOCIAL INSECURITY: HAS ANYONE EVER THREATENED TO HURT YOUR FAMILY OR YOUR PETS?: NO

## 2025-02-01 SDOH — SOCIAL STABILITY: SOCIAL INSECURITY: WITHIN THE LAST YEAR, HAVE YOU BEEN AFRAID OF YOUR PARTNER OR EX-PARTNER?: NO

## 2025-02-01 SDOH — SOCIAL STABILITY: SOCIAL INSECURITY
WITHIN THE LAST YEAR, HAVE YOU BEEN RAPED OR FORCED TO HAVE ANY KIND OF SEXUAL ACTIVITY BY YOUR PARTNER OR EX-PARTNER?: NO

## 2025-02-01 SDOH — SOCIAL STABILITY: SOCIAL INSECURITY
WITHIN THE LAST YEAR, HAVE YOU BEEN KICKED, HIT, SLAPPED, OR OTHERWISE PHYSICALLY HURT BY YOUR PARTNER OR EX-PARTNER?: NO

## 2025-02-01 SDOH — ECONOMIC STABILITY: FOOD INSECURITY: WITHIN THE PAST 12 MONTHS, YOU WORRIED THAT YOUR FOOD WOULD RUN OUT BEFORE YOU GOT THE MONEY TO BUY MORE.: NEVER TRUE

## 2025-02-01 SDOH — SOCIAL STABILITY: SOCIAL INSECURITY: HAVE YOU HAD ANY THOUGHTS OF HARMING ANYONE ELSE?: NO

## 2025-02-01 SDOH — ECONOMIC STABILITY: TRANSPORTATION INSECURITY: IN THE PAST 12 MONTHS, HAS LACK OF TRANSPORTATION KEPT YOU FROM MEDICAL APPOINTMENTS OR FROM GETTING MEDICATIONS?: NO

## 2025-02-01 SDOH — ECONOMIC STABILITY: HOUSING INSECURITY: IN THE PAST 12 MONTHS, HOW MANY TIMES HAVE YOU MOVED WHERE YOU WERE LIVING?: 1

## 2025-02-01 SDOH — SOCIAL STABILITY: SOCIAL INSECURITY: ARE YOU OR HAVE YOU BEEN THREATENED OR ABUSED PHYSICALLY, EMOTIONALLY, OR SEXUALLY BY ANYONE?: NO

## 2025-02-01 SDOH — SOCIAL STABILITY: SOCIAL INSECURITY: ABUSE: ADULT

## 2025-02-01 SDOH — SOCIAL STABILITY: SOCIAL INSECURITY: WERE YOU ABLE TO COMPLETE ALL THE BEHAVIORAL HEALTH SCREENINGS?: YES

## 2025-02-01 SDOH — SOCIAL STABILITY: SOCIAL INSECURITY: DO YOU FEEL UNSAFE GOING BACK TO THE PLACE WHERE YOU ARE LIVING?: YES

## 2025-02-01 SDOH — SOCIAL STABILITY: SOCIAL INSECURITY: HAVE YOU HAD THOUGHTS OF HARMING ANYONE ELSE?: NO

## 2025-02-01 ASSESSMENT — COGNITIVE AND FUNCTIONAL STATUS - GENERAL
MOBILITY SCORE: 24
PATIENT BASELINE BEDBOUND: NO
DAILY ACTIVITIY SCORE: 24
MOBILITY SCORE: 24
DAILY ACTIVITIY SCORE: 24
MOBILITY SCORE: 24
DAILY ACTIVITIY SCORE: 24

## 2025-02-01 ASSESSMENT — PATIENT HEALTH QUESTIONNAIRE - PHQ9
2. FEELING DOWN, DEPRESSED OR HOPELESS: NOT AT ALL
SUM OF ALL RESPONSES TO PHQ9 QUESTIONS 1 & 2: 3
1. LITTLE INTEREST OR PLEASURE IN DOING THINGS: NEARLY EVERY DAY

## 2025-02-01 ASSESSMENT — LIFESTYLE VARIABLES
HOW OFTEN DO YOU HAVE 6 OR MORE DRINKS ON ONE OCCASION: NEVER
HOW MANY STANDARD DRINKS CONTAINING ALCOHOL DO YOU HAVE ON A TYPICAL DAY: 1 OR 2
HOW OFTEN DO YOU HAVE A DRINK CONTAINING ALCOHOL: MONTHLY OR LESS
SKIP TO QUESTIONS 9-10: 1
AUDIT-C TOTAL SCORE: 1
AUDIT-C TOTAL SCORE: 1

## 2025-02-01 ASSESSMENT — PAIN - FUNCTIONAL ASSESSMENT
PAIN_FUNCTIONAL_ASSESSMENT: 0-10

## 2025-02-01 ASSESSMENT — ACTIVITIES OF DAILY LIVING (ADL)
LACK_OF_TRANSPORTATION: NO
BATHING: INDEPENDENT
TOILETING: INDEPENDENT
LACK_OF_TRANSPORTATION: NO
PATIENT'S MEMORY ADEQUATE TO SAFELY COMPLETE DAILY ACTIVITIES?: YES
GROOMING: INDEPENDENT
FEEDING YOURSELF: INDEPENDENT
ADEQUATE_TO_COMPLETE_ADL: YES
HEARING - LEFT EAR: FUNCTIONAL
JUDGMENT_ADEQUATE_SAFELY_COMPLETE_DAILY_ACTIVITIES: YES
HEARING - RIGHT EAR: FUNCTIONAL
DRESSING YOURSELF: INDEPENDENT
LACK_OF_TRANSPORTATION: NO
WALKS IN HOME: INDEPENDENT

## 2025-02-01 ASSESSMENT — PAIN SCALES - GENERAL
PAINLEVEL_OUTOF10: 5 - MODERATE PAIN
PAINLEVEL_OUTOF10: 8
PAINLEVEL_OUTOF10: 0 - NO PAIN
PAINLEVEL_OUTOF10: 8
PAINLEVEL_OUTOF10: 7

## 2025-02-01 ASSESSMENT — PAIN DESCRIPTION - ORIENTATION
ORIENTATION: RIGHT;LEFT;MID
ORIENTATION: RIGHT;MID;LEFT

## 2025-02-01 ASSESSMENT — PAIN DESCRIPTION - LOCATION
LOCATION: ABDOMEN
LOCATION: ABDOMEN

## 2025-02-01 NOTE — PROGRESS NOTES
02/01/25 1419   Discharge Planning   Living Arrangements Alone   Support Systems Parent   Assistance Needed none   Type of Residence Private residence   Number of Stairs to Enter Residence 2   Home or Post Acute Services None   Expected Discharge Disposition Home   Housing Stability   At any time in the past 12 months, were you homeless or living in a shelter (including now)? N   Transportation Needs   In the past 12 months, has lack of transportation kept you from meetings, work, or from getting things needed for daily living? No   Stroke Family Assessment   Stroke Family Assessment Needed No   Intensity of Service   Intensity of Service 0-30 min     I spoke with patient to introduce discharge planning and explain role of TCC. Pt consented to speaking with me in presence of his mom.  He said he is independent at home where he lives alone in a first floor apartment.  He denies use of any DME or having any falls.  His PCP is Edda Osborne.  He is planning on returning home on DC. Will continue to follow should anything change for him.

## 2025-02-01 NOTE — ED NOTES
Pt arrives to ED via car from home    Code Status:  Full Code    HPI     Chief Complaint   Patient presents with    Chest Pain    Abdominal Pain    Hives       /81   Pulse 95   Temp 36.6 °C (97.9 °F) (Temporal)   Resp 18   Wt 117 kg (258 lb)   SpO2 99%     [place] Coma Scale Score: 15      LDA:   Peripheral IV 01/31/25 20 G Left Antecubital (Active)   Placement Date/Time: 01/31/25 1911   Hand Hygiene Completed: Yes  Size (Gauge): 20 G  Orientation: Left  Location: Antecubital   Number of days: 0        BACKGROUND  Past Medical History:   Diagnosis Date    Allergic     Anemia     Headache     Hypertension     Mild anemia 04/14/2023    Personal history of diseases of the blood and blood-forming organs and certain disorders involving the immune mechanism     History of sickle cell trait    Personal history of diseases of the skin and subcutaneous tissue 03/07/2017    History of keloid of skin    Personal history of other diseases of urinary system 08/22/2016    History of hematuria    Tonsillitis 12/07/2023     History reviewed. No pertinent surgical history.  No current facility-administered medications on file prior to encounter.     Current Outpatient Medications on File Prior to Encounter   Medication Sig Dispense Refill    albuterol (Proventil HFA) 90 mcg/actuation inhaler Inhale 2 puffs every 4 hours if needed for wheezing or shortness of breath. 6.7 g 0    amLODIPine (Norvasc) 10 mg tablet Take 1 tablet (10 mg) by mouth once daily. 90 tablet 0    azithromycin (Zithromax Z-Critsian) 250 mg tablet Take as directed. (Patient not taking: Reported on 1/30/2025) 6 tablet 0    benzonatate (Tessalon) 100 mg capsule Take 1 capsule (100 mg) by mouth 3 times a day as needed for cough. Do not crush or chew. (Patient not taking: Reported on 1/30/2025) 42 capsule 0    busPIRone (Buspar) 15 mg tablet Take 1 tablet (15 mg) by mouth 2 times a day. 180 tablet 0    chlorthalidone (Hygroton) 25 mg tablet Take 1 tablet (25 mg)  by mouth once daily. 90 tablet 0    cyanocobalamin (Vitamin B-12) 1,000 mcg tablet Take 1 tablet (1,000 mcg) by mouth once daily. (Patient not taking: Reported on 1/30/2025)      emtricitabine-tenofovir, TDF, (Truvada) 200-300 mg tablet Take 1 tablet by mouth early in the morning.. 90 tablet 1    fluticasone (Flonase) 50 mcg/actuation nasal spray Administer 2 sprays into each nostril once daily. 16 g 2    losartan (Cozaar) 100 mg tablet Take 1 tablet (100 mg) by mouth once daily. 90 tablet 0    methocarbamol (Robaxin) 500 mg tablet Take 1 tablet (500 mg) by mouth 4 times a day as needed for muscle spasms. 40 tablet 0    promethazine-DM (Phenergan-DM) 6.25-15 mg/5 mL syrup Take 5 mL by mouth every 4 hours if needed for cough. 120 mL 0    sertraline (Zoloft) 50 mg tablet Take 1/2 tab daily for first 8 days then take 1 tab daily 90 tablet 0    triamcinolone (Kenalog) 0.1 % cream Apply topically 2 times a day. Apply to affected area 1-2 times daily as needed. Avoid face and groin. 30 g 0        ASSESSMENT  ED Course as of 02/01/25 0235 Fri Jan 31, 2025   1905 Reviewed patient's blood work.  Patient does have elevation in ALT AST and alk phos.  Patient has had negative HIV screening as well as negative mono screening.  Patient also had negative COVID and flu recently. [CJ]   2040 Secondary to patient having increase in lipase and LFTs from recent visit with normal ultrasound patient will have CT abdomen and pelvis ordered. [CJ]   2102 Updated patient on the plan of care going forward.  He is in agreement with receiving CT imaging. [CJ]   2258 Spoke to the hospitalist Dr. Cardenas who agreed to admit the patient for observation. [CJ]      ED Course User Index  [CJ] Hong Jim PA-C         Diagnoses as of 02/01/25 0235   Elevated lipase   Elevated LFTs   Abdominal pain, unspecified abdominal location       Medications Currently Running:        Medications Given:  ED Medication Administration from 01/31/2025 1535 to  02/01/2025 0235         Date/Time Order Dose Route Action Action by     01/31/2025 1914 EST diphenhydrAMINE (BENADryl) injection 25 mg 25 mg intravenous Given Josi, K     01/31/2025 1914 EST morphine injection 4 mg 4 mg intravenous Given Josi, K     01/31/2025 1915 EST ondansetron (Zofran) injection 4 mg 4 mg intravenous Given Josi, K     01/31/2025 2113 EST iohexol (OMNIPaque) 350 mg iodine/mL solution 75 mL 75 mL intravenous Given Corona, GARRETT     01/31/2025 2253 EST morphine injection 4 mg 4 mg intravenous Given Brutto, R                 RESULTS    Imaging:  CT abdomen pelvis w IV contrast   Final Result   No acute abdominal or pelvic process.        No peripancreatic inflammatory changes or fluid is seen. Correlate   with laboratory examinationIf there is concern for early acute   pancreatitis.        Mild bladder wall thickening may relate to under distention.   Correlate with symptomatology and urinalysis if there is clinical   concern for cystitis.        Additional findings as described above.        MACRO:   None        Signed by: Gloria Sánchez 1/31/2025 9:53 PM   Dictation workstation:   [id number]      US gallbladder   Final Result   Unremarkable ultrasound of the right upper quadrant.  No   cholelithiasis, gallbladder wall thickening, or biliary dilatation is   appreciated.   Signed by Andrew Blakely,       XR chest 2 views   Final Result   1.  No evidence of acute cardiopulmonary process.                  MACRO:   None        Signed by: Domingo France 1/31/2025 8:25 PM   Dictation workstation:   [id number]         }  Labs ::99  Abnormal Labs Reviewed   CBC WITH AUTO DIFFERENTIAL - Abnormal; Notable for the following components:       Result Value    Lymphocytes Absolute 1.16 (*)     All other components within normal limits   COMPREHENSIVE METABOLIC PANEL - Abnormal; Notable for the following components:    Sodium 135 (*)     Alkaline Phosphatase 151 (*)     Total Protein 8.8 (*)       (*)      (*)     All other components within normal limits   LIPASE - Abnormal; Notable for the following components:    Lipase 188 (*)     All other components within normal limits    Narrative:     Venipuncture immediately after or during the administration of Metamizole may lead to falsely low results. Testing should be performed immediately prior to Metamizole dosing.   URINALYSIS WITH REFLEX CULTURE AND MICROSCOPIC - Abnormal; Notable for the following components:    Urobilinogen, Urine 2 (1+) (*)     All other components within normal limits   PROTIME-INR - Abnormal; Notable for the following components:    Protime 13.6 (*)     INR 1.2 (*)     All other components within normal limits   APTT - Abnormal; Notable for the following components:    aPTT 43 (*)     All other components within normal limits    Narrative:     The APTT is no longer used for monitoring Unfractionated Heparin Therapy. For monitoring Heparin Therapy, use the Heparin Assay.   DRUG SCREEN,URINE - Abnormal; Notable for the following components:    Cannabinoid Screen, Urine Presumptive Positive (*)     Opiate Screen, Urine Presumptive Positive (*)     All other components within normal limits    Narrative:     Drug screen results are presumptive and should not be used to assess                   compliance with prescribed medication. Contact the performing [facility] laboratory                   to add-on definitive confirmatory testing if clinically indicated.                                    Toxicology screening results are reported qualitatively. The concentration must                   be greater than or equal to the cutoff to be reported as positive. The concentration                   at which the screening test can detect an individual drug or metabolite varies.                   The absence of expected drug(s) and/or drug metabolite(s) may indicate non-compliance,                   inappropriate timing of specimen  collection relative to drug administration, poor drug                   absorption, diluted/adulterated urine, or limitations of testing. For medical purposes                   only; not valid for forensic use.                                    Interpretive questions should be directed to the laboratory medical directors.                 Gina Griffin RN  02/01/25 023

## 2025-02-01 NOTE — ED PROVIDER NOTES
EMERGENCY MEDICINE EVALUATION NOTE    History of Present Illness     Chief Complaint:   Chief Complaint   Patient presents with    Chest Pain    Abdominal Pain    Hives       HPI: Dena Rivero is a 24 y.o. adult presents with a chief complaint of multiple medical complaints.  Patient reports that he been seen multiple times in both the ER and by his primary care provider over the last week or so.  He states that he has been feeling fatigued and not feeling well for the last 2 to 3 weeks.  States that he was seen at a [facility] emergency department where they noted that he had elevated LFTs but they were not significantly elevated and they recommended he follow-up with his primary care provider and GI.  He states that he went back after not feeling any better and they did not notice any real significant change in his blood work.  He says that he followed up with his primary care provider yesterday who did notice a little bit of an increase in his LFTs.  He does state that he was seen and discharged home with the intent to have ultrasound performed as an outpatient.  Patient comes back today with worsening symptoms.  He states that he still experiencing this abdominal pain and feels that he is having some chest pain today as well.  He also notes that he is had some intermittent hives over these last couple weeks.  He worsened has been tested for flu COVID as well as mono which were all negative as an outpatient.    Previous History     Past Medical History:   Diagnosis Date    Allergic     Anemia     Headache     Hypertension     Mild anemia 04/14/2023    Personal history of diseases of the blood and blood-forming organs and certain disorders involving the immune mechanism     History of sickle cell trait    Personal history of diseases of the skin and subcutaneous tissue 03/07/2017    History of keloid of skin    Personal history of other diseases of urinary system 08/22/2016    History of hematuria    Tonsillitis  12/07/2023     History reviewed. No pertinent surgical history.  Social History     Tobacco Use    Smoking status: Never    Smokeless tobacco: Never   Vaping Use    Vaping status: Some Days    Substances: CBD    Devices: Disposable   Substance Use Topics    Alcohol use: Not Currently     Comment: Socially.    Drug use: Yes     Types: Marijuana     Family History   Problem Relation Name Age of Onset    Hypertension Maternal Grandmother Malaika Rivero     Diabetes Maternal Grandmother Malaika Rivero      No Known Allergies  Current Outpatient Medications   Medication Instructions    albuterol (Proventil HFA) 90 mcg/actuation inhaler 2 puffs, inhalation, Every 4 hours PRN    amLODIPine (NORVASC) 10 mg, oral, Daily    azithromycin (Zithromax Z-Cristian) 250 mg tablet Take as directed.    benzonatate (TESSALON) 100 mg, oral, 3 times daily PRN, Do not crush or chew.    busPIRone (BUSPAR) 15 mg, oral, 2 times daily    chlorthalidone (HYGROTON) 25 mg, oral, Daily    cyanocobalamin (VITAMIN B-12) 1,000 mcg, Daily    emtricitabine-tenofovir, TDF, (Truvada) 200-300 mg tablet 1 tablet, oral, Daily (0630)    fluticasone (Flonase) 50 mcg/actuation nasal spray 2 sprays, Each Nostril, Daily    losartan (COZAAR) 100 mg, oral, Daily    methocarbamol (ROBAXIN) 500 mg, oral, 4 times daily PRN    promethazine-DM (Phenergan-DM) 6.25-15 mg/5 mL syrup 5 mL, oral, Every 4 hours PRN    sertraline (Zoloft) 50 mg tablet Take 1/2 tab daily for first 8 days then take 1 tab daily    triamcinolone (Kenalog) 0.1 % cream Topical, 2 times daily, Apply to affected area 1-2 times daily as needed. Avoid face and groin.       Physical Exam     Appearance: Alert, oriented , cooperative,  in no acute distress.      Skin: Intact,  dry skin, no lesions, rash, petechiae or purpura.      Eyes: PERRLA, EOMs intact,  Conjunctiva pink      ENT: Hearing grossly intact. Pharynx clear     Neck: Supple. Trachea at midline.     Pulmonary: Clear bilaterally. No rales,  rhonchi or wheezing. No accessory muscle use or stridor.     Cardiac: Normal rate and rhythm without murmur     Abdomen: Soft, active bowel sounds.  Diffuse epigastric abdominal tenderness with no guarding or rebound.     Musculoskeletal: Full range of motion.      Neurological:Cranial nerves II through XII are grossly intact, normal sensation, no weakness, no focal findings identified.     Results     Labs Reviewed   CBC WITH AUTO DIFFERENTIAL - Abnormal       Result Value    WBC 6.6      nRBC 0.0      RBC 4.67      Hemoglobin 12.4      Hematocrit 38.0      MCV 81      MCH 26.6      MCHC 32.6      RDW 11.9      Platelets 250      Neutrophils % 68.9      Immature Granulocytes %, Automated 0.3      Lymphocytes % 17.6      Monocytes % 11.2      Eosinophils % 1.5      Basophils % 0.5      Neutrophils Absolute 4.55      Immature Granulocytes Absolute, Automated 0.02      Lymphocytes Absolute 1.16 (*)     Monocytes Absolute 0.74      Eosinophils Absolute 0.10      Basophils Absolute 0.03     COMPREHENSIVE METABOLIC PANEL - Abnormal    Glucose 94      Sodium 135 (*)     Potassium 4.1      Chloride 104      Bicarbonate 25      Anion Gap 10      Urea Nitrogen 8      Creatinine 0.78      eGFR >90      Calcium 9.0      Albumin 3.7      Alkaline Phosphatase 151 (*)     Total Protein 8.8 (*)      (*)     Bilirubin, Total 0.6       (*)    LIPASE - Abnormal    Lipase 188 (*)     Narrative:     Venipuncture immediately after or during the administration of Metamizole may lead to falsely low results. Testing should be performed immediately prior to Metamizole dosing.   URINALYSIS WITH REFLEX CULTURE AND MICROSCOPIC - Abnormal    Color, Urine Yellow      Appearance, Urine Clear      Specific Gravity, Urine 1.021      pH, Urine 5.5      Protein, Urine NEGATIVE      Glucose, Urine Normal      Blood, Urine NEGATIVE      Ketones, Urine NEGATIVE      Bilirubin, Urine NEGATIVE      Urobilinogen, Urine 2 (1+) (*)     Nitrite,  Urine NEGATIVE      Leukocyte Esterase, Urine NEGATIVE     LACTATE - Normal    Lactate 1.0      Narrative:     Venipuncture immediately after or during the administration of Metamizole may lead to falsely low results. Testing should be performed immediately prior to Metamizole dosing.   TROPONIN I, HIGH SENSITIVITY - Normal    Troponin I, High Sensitivity 3      Narrative:     Less than 99th percentile of normal range cutoff-  Female and children under 18 years old <14 ng/L; Male <21 ng/L: Negative  Repeat testing should be performed if clinically indicated.     Female and children under 18 years old 14-50 ng/L; Male 21-50 ng/L:  Consistent with possible cardiac damage and possible increased clinical   risk. Serial measurements may help to assess extent of myocardial damage.     >50 ng/L: Consistent with cardiac damage, increased clinical risk and  myocardial infarction. Serial measurements may help assess extent of   myocardial damage.      NOTE: Children less than 1 year old may have higher baseline troponin   levels and results should be interpreted in conjunction with the overall   clinical context.     NOTE: Troponin I testing is performed using a different   testing methodology at [facility] than at other   [facility]. Direct result comparisons should only   be made within the same method.   URINALYSIS WITH REFLEX CULTURE AND MICROSCOPIC    Narrative:     The following orders were created for panel order Urinalysis with Reflex Culture and Microscopic.  Procedure                               Abnormality         Status                     ---------                               -----------         ------                     Urinalysis with Reflex C...[[id number]]  Abnormal            Final result               Extra Urine Gray Tube[[id number]]                            In process                   Please view results for these tests on the individual orders.   EXTRA URINE GRAY TUBE  "  HEPATITIS PANEL, ACUTE   PROTIME-INR   APTT   DRUG SCREEN,URINE     CT abdomen pelvis w IV contrast   Final Result   No acute abdominal or pelvic process.        No peripancreatic inflammatory changes or fluid is seen. Correlate   with laboratory examinationIf there is concern for early acute   pancreatitis.        Mild bladder wall thickening may relate to under distention.   Correlate with symptomatology and urinalysis if there is clinical   concern for cystitis.        Additional findings as described above.        MACRO:   None        Signed by: Gloria Sánchez 1/31/2025 9:53 PM   Dictation workstation:   [id number]      US gallbladder   Final Result   Unremarkable ultrasound of the right upper quadrant.  No   cholelithiasis, gallbladder wall thickening, or biliary dilatation is   appreciated.   Signed by Andrew Blakely, DO      XR chest 2 views   Final Result   1.  No evidence of acute cardiopulmonary process.                  MACRO:   None        Signed by: Domingo France 1/31/2025 8:25 PM   Dictation workstation:   [id number]            ED Course & Medical Decision Making     Medications   morphine injection 4 mg (4 mg intravenous Given 1/31/25 1914)   ondansetron (Zofran) injection 4 mg (4 mg intravenous Given 1/31/25 1915)   diphenhydrAMINE (BENADryl) injection 25 mg (25 mg intravenous Given 1/31/25 1914)   iohexol (OMNIPaque) 350 mg iodine/mL solution 75 mL (75 mL intravenous Given 1/31/25 2113)   morphine injection 4 mg (4 mg intravenous Given 1/31/25 2253)     Heart Rate:  []   Temperature:  [36.6 °C (97.8 °F)-36.6 °C (97.9 °F)]   Respirations:  [18]   BP: (136-159)/(76-93)   Height:  [195.6 cm (6' 5\")]   Weight:  [117 kg (258 lb)-129 kg (285 lb)]   Pulse Ox:  [99 %]    ED Course as of 01/31/25 2300 Fri Jan 31, 2025 1905 Reviewed patient's blood work.  Patient does have elevation in ALT AST and alk phos.  Patient has had negative HIV screening as well as negative mono screening.  " Patient also had negative COVID and flu recently. [CJ]   2040 Secondary to patient having increase in lipase and LFTs from recent visit with normal ultrasound patient will have CT abdomen and pelvis ordered. [CJ]   2102 Updated patient on the plan of care going forward.  He is in agreement with receiving CT imaging. [CJ]   2258 Spoke to the hospitalist Dr. Cardenas who agreed to admit the patient for observation. [CJ]      ED Course User Index  [CJ] Hong Jim PA-C         Diagnoses as of 01/31/25 2300   Elevated lipase   Elevated LFTs   Abdominal pain, unspecified abdominal location       Procedures   Procedures    Diagnosis     1. Elevated lipase    2. Elevated LFTs    3. Abdominal pain, unspecified abdominal location        Disposition   Admitted    ED Prescriptions    None         Disclaimer: This note was dictated by speech recognition. Minor errors in transcription may be present. Please call if questions.       Hong Jim PA-C  01/31/25 2300

## 2025-02-01 NOTE — CONSULTS
"Inpatient consult to Gastroenterology  Consult performed by: Marcial Leija MD  Consult ordered by: Adam Cardenas MD          Reason For Consult  \"elevated LFTs\"        [facility] Gastroenterology Consultation Note    ASSESSMENT and PLAN:       Dena Rivero \"Lad\" is a 24 y.o. adult with a significant past medical history of sickle cell trait, DAYTON, obesity (BMI 42), anxiety, migraines, and HTN who presented with after being told that LFTs were elevated. GI was consulted for \"elevated LFTs\".       Elevated LFTs  LFTs elevated in a primarily hepatocellular pattern. Previously LFTs have been normal, but most recent ones prior to this episode were in December 2023. Suspect that current elevation is secondary to a viral process or medication induced. Azithromycin was given prior to LFT changes. That can be a cause of LFT elevation and while it is most commonly a cholestatic pattern it can be hepatocellular as well. Acute hepatitis panel was negative and there is no evidence of any obstructive process on imaging. Lipase is elevated, but imaging shows no evidence of pancreatitis and symptoms are less consistent with pancreatitis.  - monitor LFTs daily  - recommend complete hepatic work up including viral hepatitis serologies (Hep A total Ab, Hep B Surface Ab, Hep B Core Ab total), CARMELA, AMA, anti-smooth muscle Ab, anti-LKM, SLA, quantitative immunoglobulins, iron panel, ferritin, ceruloplasmin, alpha-1 antitrypsin phenotype, and viral work up (CMV and HSV)          Marcial Leija MD        Senior Attending Physician, Gastroenterology    [facility]    Clinical   [facility] [employer]        HISTORY OF PRESENT ILLNESS:     History Of Present Illness:    Dena Rivero \"Lad\" is a 24 y.o. adult with a significant past medical history of sickle cell trait, DAYTON, obesity (BMI 42), anxiety, migraines, and HTN who " "presented with after being told that LFTs were elevated. GI was consulted for \"elevated LFTs\".      Today Lad says that this all started with \"cold/flu\" symptoms. A viral illness was suspected and medications (including Azithromycin and steroids) were given. Prior to that they deny any history of liver disease or any previous mention of liver test abnormalities. No other new medications or changes in medications and no OTC meds or herbals/supplements. No significant Acetaminophen use. Lad says that there has been some pain across both sides of the bottom of the chest/upper part of the abdomen and there was one day where there was nausea, but no vomiting. No jaundice.      Review of systems:     Patient denies any heartburn/GERD, N/V, dysphagia, odynophagia, diarrhea, constipation, hematemesis, hematochezia, melena, or weight loss.    I performed a complete 10 point review of systems and it is negative except as noted in HPI or above. All other systems have been reviewed and are negative.        PAST HISTORIES:       Past Medical History:  Patient Active Problem List   Diagnosis    Benign essential hypertension    Chronic nasal congestion    Migraines    Seasonal allergies    Sickle cell trait (CMS-[facility])    Encounter for pre-exposure prophylaxis for HIV    At risk for HIV due to homosexual contact    Generalized anxiety disorder    B12 deficiency    Morbid obesity with body mass index (BMI) of 40.0 to 44.9 in adult (Multi)    Sleep apnea    Acute upper respiratory infection    Bell's palsy    Myalgia    Transaminitis    Lymph node enlargement    Elevated LFTs     Lad has a past medical history of Allergic, Anemia, Headache, Hypertension, Mild anemia (04/14/2023), Personal history of diseases of the blood and blood-forming organs and certain disorders involving the immune mechanism, Personal history of diseases of the skin and subcutaneous tissue (03/07/2017), Personal history of other diseases of urinary system " "(08/22/2016), and Tonsillitis (12/07/2023).    Past Surgical History:  Lad has no past surgical history on file.      Social History:  Lad reports that Lad has never smoked. Lad has never used smokeless tobacco. Lad reports that Lad does not currently use alcohol. Lad reports current drug use. Drug: Marijuana.    Family History:  No known family history of GI disease, specifically denies pancreatitis, Crohn's, colon cancer, gastroesophageal cancer, or ulcerative colitis.    Family History   Problem Relation Name Age of Onset    Hypertension Maternal Grandmother Malaika Rivero     Diabetes Maternal Grandmother Malaika Rivero         Allergies:  Patient has no known allergies.      OBJECTIVE:       Last Recorded Vitals:  Vitals:    01/31/25 1854 02/01/25 0118 02/01/25 0321 02/01/25 0926   BP: (!) 159/93 141/81 (!) 162/94 126/82   BP Location: Left arm  Right arm Right arm   Patient Position: Sitting  Lying Lying   Pulse: 96 95 81 95   Resp: 18 18 18 17   Temp: 36.6 °C (97.9 °F)  37.2 °C (99 °F) 37.2 °C (99 °F)   TempSrc: Temporal  Temporal Temporal   SpO2: 99% 99% 99% 98%   Weight: 117 kg (258 lb)  (!) 163 kg (359 lb 5.6 oz)    Height: 1.956 m (6' 5\")  1.956 m (6' 5\")      /82 (BP Location: Right arm, Patient Position: Lying)   Pulse 95   Temp 37.2 °C (99 °F) (Temporal)   Resp 17   Ht 1.956 m (6' 5\")   Wt (!) 163 kg (359 lb 5.6 oz)   SpO2 98%   BMI 42.61 kg/m²      Physical Exam:    Physical Exam  Vitals reviewed.   Constitutional:       General: Lad is not in acute distress.     Appearance: Lad is obese. Lad is not ill-appearing.   HENT:      Head: Normocephalic and atraumatic.   Eyes:      General: No scleral icterus.  Cardiovascular:      Rate and Rhythm: Normal rate and regular rhythm.      Pulses: Normal pulses.      Heart sounds: Normal heart sounds. No murmur heard.  Pulmonary:      Effort: Pulmonary effort is normal. No respiratory distress.      Breath sounds: Normal breath sounds. No wheezing. "   Abdominal:      General: Bowel sounds are normal.      Palpations: Abdomen is soft.      Tenderness: There is no abdominal tenderness. There is no rebound.   Musculoskeletal:         General: No swelling or deformity.   Skin:     General: Skin is warm and dry.      Coloration: Skin is not jaundiced.      Findings: No rash.   Neurological:      General: No focal deficit present.      Mental Status: Lad is alert and oriented to person, place, and time.   Psychiatric:         Mood and Affect: Mood normal.         Behavior: Behavior normal.         Thought Content: Thought content normal.         Judgment: Judgment normal.           Inpatient Medications:     PRN medications: acetaminophen **OR** acetaminophen **OR** acetaminophen, alum-mag hydroxide-simeth, benzocaine-menthol, dextromethorphan-guaifenesin, guaiFENesin, melatonin, ondansetron **OR** ondansetron, polyethylene glycol    Outpatient Medications:  Prior to Admission medications    Medication Sig Start Date End Date Taking? Authorizing Provider   albuterol (Proventil HFA) 90 mcg/actuation inhaler Inhale 2 puffs every 4 hours if needed for wheezing or shortness of breath. 1/8/25 1/8/26 Yes June Justin PA-C   amLODIPine (Norvasc) 10 mg tablet Take 1 tablet (10 mg) by mouth once daily. 12/2/24 12/2/25 Yes Katerine Osborne MD   busPIRone (Buspar) 15 mg tablet Take 1 tablet (15 mg) by mouth 2 times a day. 12/2/24 12/2/25 Yes Katerine Osborne MD   chlorthalidone (Hygroton) 25 mg tablet Take 1 tablet (25 mg) by mouth once daily. 12/2/24 12/2/25 Yes Katerine Osborne MD   emtricitabine-tenofovir, TDF, (Truvada) 200-300 mg tablet Take 1 tablet by mouth early in the morning.. 12/13/24  Yes Katerine Osborne MD   fluticasone (Flonase) 50 mcg/actuation nasal spray Administer 2 sprays into each nostril once daily. 6/24/24  Yes TANA Tijerina-CNP   losartan (Cozaar) 100 mg tablet Take 1 tablet (100 mg) by mouth once daily. 12/2/24 12/2/25 Yes  Katerine Osborne MD   methocarbamol (Robaxin) 500 mg tablet Take 1 tablet (500 mg) by mouth 4 times a day as needed for muscle spasms. 1/30/25 3/31/25 Yes TANA Tijerina-CNP   promethazine-DM (Phenergan-DM) 6.25-15 mg/5 mL syrup Take 5 mL by mouth every 4 hours if needed for cough. 1/24/25 2/23/25 Yes Katerine Osborne MD   azithromycin (Zithromax Z-Cristian) 250 mg tablet Take as directed.  Patient not taking: Reported on 1/30/2025 1/24/25   Katerine Osborne MD   benzonatate (Tessalon) 100 mg capsule Take 1 capsule (100 mg) by mouth 3 times a day as needed for cough. Do not crush or chew.  Patient not taking: Reported on 1/30/2025 1/8/25 2/7/25  June Justin PA-C   cyanocobalamin (Vitamin B-12) 1,000 mcg tablet Take 1 tablet (1,000 mcg) by mouth once daily.  Patient not taking: Reported on 1/30/2025    Historical Provider, MD   sertraline (Zoloft) 50 mg tablet Take 1/2 tab daily for first 8 days then take 1 tab daily  Patient taking differently: Take 1 tablet (50 mg) by mouth once daily. 12/2/24   Katerine Osborne MD   triamcinolone (Kenalog) 0.1 % cream Apply topically 2 times a day. Apply to affected area 1-2 times daily as needed. Avoid face and groin. 1/24/25   Katerine Osborne MD       LABS AND IMAGING:     Labs:  Recent labs reviewed in the EMR.    Lab Results   Component Value Date    WBC 5.9 02/01/2025    HGB 11.7 (L) 02/01/2025    HGB 12.4 01/31/2025    MCV 84 02/01/2025     02/01/2025     01/31/2025       Lab Results   Component Value Date     02/01/2025    K 4.3 02/01/2025     02/01/2025    BUN 10 02/01/2025    CREATININE 0.79 02/01/2025    CREATININE 0.78 01/31/2025       Lab Results   Component Value Date    BILITOT 0.5 02/01/2025    BILITOT 0.6 01/31/2025    BILIDIR 0.0 02/01/2025    ALKPHOS 136 (H) 02/01/2025    ALKPHOS 151 (H) 01/31/2025     (H) 02/01/2025     (H) 01/31/2025     (H) 02/01/2025     (H) 01/31/2025    LIPASE  "188 (H) 01/31/2025       No results found for: \"CRP\", \"CALPS\"      Imaging:  CT abdomen pelvis w IV contrast    Result Date: 1/31/2025  Interpreted By:  Gloria Sánchez, STUDY: CT ABDOMEN PELVIS W IV CONTRAST;  1/31/2025 9:21 pm   INDICATION: Signs/Symptoms:Elevated LFTs, elevated lipase, abdominal pain.   COMPARISON: None.   ACCESSION NUMBER(S): [id number]   ORDERING CLINICIAN: NATI HENRIQUEZ   TECHNIQUE: Axial CT images of the abdomen and pelvis with coronal and sagittal reconstructed images obtained after intravenous administration of 75 mL of Omnipaque 350   FINDINGS: LOWER CHEST: No acute abnormality of the lung bases.   ABDOMEN:   LIVER: Within normal limits. BILE DUCTS: Normal caliber. GALLBLADDER: No calcified gallstones. No wall thickening. PANCREAS: Within normal limits. SPLEEN: Within normal limits. ADRENALS: Within normal limits. KIDNEYS and URETERS: Symmetric renal enhancement. No hydronephrosis or perinephric fluid collection.   VESSELS:   No aortic aneurysm. RETROPERITONEUM: Nonenlarged periaortic lymph nodes noted.   PELVIS:   REPRODUCTIVE ORGANS: No pelvic masses. BLADDER: Bladder is underdistended with apparent wall thickening.   BOWEL: No dilated bowel. Incidental note is made of colonic interposition. Normal appendix. PERITONEUM: No ascites or free air, no fluid collection.   ABDOMINAL WALL: Within normal limits. BONES: No acute osseous abnormality.       No acute abdominal or pelvic process.   No peripancreatic inflammatory changes or fluid is seen. Correlate with laboratory examinationIf there is concern for early acute pancreatitis.   Mild bladder wall thickening may relate to under distention. Correlate with symptomatology and urinalysis if there is clinical concern for cystitis.   Additional findings as described above.   MACRO: None   Signed by: Gloria Sánchez 1/31/2025 9:53 PM Dictation workstation:   [id number]        US gallbladder    Result Date: 1/31/2025  STUDY: Right Upper Quadrant " Ultrasound; 1/31/2025 8:08 PM INDICATION: Epigastric pain.  Nausea.  Elevated LFT. COMPARISON: None Available. ACCESSION NUMBER(S): [id number] ORDERING CLINICIAN: NATI HENRIQUEZ TECHNIQUE: Ultrasound of the Right Upper Quadrant. FINDINGS: LIVER: The liver demonstrates normal echogenicity.   GALLBLADDER: The gallbladder is anechoic.  There are no stones.  There is no pericholecystic fluid or wall thickening.  Sonographic Will's sign is negative.   BILE DUCTS: The common bile duct measures 0.4 cm.  There is no intrahepatic biliary dilatation.   PANCREAS: The pancreas is not well seen due to overlying bowel gas.  RIGHT KIDNEY: The right kidney measures 11.4 cm in length.  Renal cortical echotexture is normal.  There is no hydronephrosis.  There are no stones.  There are no cysts.    Unremarkable ultrasound of the right upper quadrant.  No cholelithiasis, gallbladder wall thickening, or biliary dilatation is appreciated. Signed by Andrew Blakely DO

## 2025-02-01 NOTE — PROGRESS NOTES
"Dena Rivero \"Lad\" is a 24 y.o. adult admitted for Elevated LFTs. Pharmacy reviewed the patient's wfyog-lp-pwpcshyro medications and allergies for accuracy.    The list below reflects the PTA list prior to pharmacy medication history. A summary a changes to the PTA medication list has been listed below. Please review each medication in order reconciliation for additional clarification and justification.    Source of information: t2p     Medications added:    Medications modified:    Medications to be removed:  Kenalog 0.1% cream   Z-cristian 250mg   Tessalon 100mg   B-12 1000mcg     Medications of concern:  Truvada 200-300mg - on hold while inpatient       Prior to Admission Medications   Prescriptions Last Dose Informant Patient Reported? Taking?   albuterol (Proventil HFA) 90 mcg/actuation inhaler Past Week  No Yes   Sig: Inhale 2 puffs every 4 hours if needed for wheezing or shortness of breath.   amLODIPine (Norvasc) 10 mg tablet Past Week  No Yes   Sig: Take 1 tablet (10 mg) by mouth once daily.   azithromycin (Zithromax Z-Cristian) 250 mg tablet   No No   Sig: Take as directed.   Patient not taking: Reported on 1/30/2025   benzonatate (Tessalon) 100 mg capsule   No No   Sig: Take 1 capsule (100 mg) by mouth 3 times a day as needed for cough. Do not crush or chew.   Patient not taking: Reported on 1/30/2025   busPIRone (Buspar) 15 mg tablet Past Week  No Yes   Sig: Take 1 tablet (15 mg) by mouth 2 times a day.   chlorthalidone (Hygroton) 25 mg tablet Past Week  No Yes   Sig: Take 1 tablet (25 mg) by mouth once daily.   cyanocobalamin (Vitamin B-12) 1,000 mcg tablet   Yes No   Sig: Take 1 tablet (1,000 mcg) by mouth once daily.   Patient not taking: Reported on 1/30/2025   emtricitabine-tenofovir, TDF, (Truvada) 200-300 mg tablet Past Week  No Yes   Sig: Take 1 tablet by mouth early in the morning..   fluticasone (Flonase) 50 mcg/actuation nasal spray Past Week  No Yes   Sig: Administer 2 sprays into each nostril " once daily.   losartan (Cozaar) 100 mg tablet Past Week  No Yes   Sig: Take 1 tablet (100 mg) by mouth once daily.   methocarbamol (Robaxin) 500 mg tablet 1/31/2025 at 11:30 AM  No Yes   Sig: Take 1 tablet (500 mg) by mouth 4 times a day as needed for muscle spasms.   promethazine-DM (Phenergan-DM) 6.25-15 mg/5 mL syrup Past Week  No Yes   Sig: Take 5 mL by mouth every 4 hours if needed for cough.   sertraline (Zoloft) 50 mg tablet   No No   Sig: Take 1/2 tab daily for first 8 days then take 1 tab daily   Patient taking differently: Take 1 tablet (50 mg) by mouth once daily.   triamcinolone (Kenalog) 0.1 % cream 1/29/2025  No No   Sig: Apply topically 2 times a day. Apply to affected area 1-2 times daily as needed. Avoid face and groin.      Facility-Administered Medications: None       TUYET BAIG

## 2025-02-01 NOTE — CARE PLAN
The patient's goals for the shift include Patient will remain comfortable and free from pain.     The clinical goals for the shift include keep comfortable

## 2025-02-01 NOTE — H&P
[facility]    Hospital Medicine History & Physical     Assessment & Plan     ASSESSMENT    24M with history of hypertension and sickle cell trait presents with abdominal pain and elevated LFTs of unclear cause.    PLAN    Elevated LFTs  -Has had prolonged flulike sx (had URI sx but resolved, still has sweats, now with RUQ abd pain)  -Viral testing including COVID, RSV, COVID, EBV negative  -No history of IV drug use, imaging negative for biliary pathology  -Patient is on Truvada which can elevate LFTs but wouldn't expect abd pain, will hold this  -Cause unclear at this point, suspect prolonged viral illness  -Follow-up viral hepatitis panel  -NPO for GI consultation    Essentially Hypertension  -Home medications    DVT Prophy  -SCDs    Disposition  -Med Surg      Jose Cardenas MD    History of Present Illness     Ladiris Rivero is a 24 y.o. with history of hypertension and sickle cell trait presents with abdominal pain.  Patient has had several recent encounters for this issue started having flulike upper respiratory symptoms in early January.  These went away with patient now with right upper quadrant abdominal pain worse with deep breaths.  Not worse after eating and patient has had regular diet since this pain came on.  Denies any nausea.  Has had what he describes as excessive sweating but no fevers or chills.  No sick contacts.  No recent travel.  Denies any history of IV drug use.  No new medications.  Had 2 ED encounters prior to this where he was found to have elevated LFTs.  Viral respiratory testing negative.  EBV and mono negative.  In the ED this admission, VSS.  .  .  Alk phos 151.  Bilirubin 0.6.  INR 1.2.  UDS with cannabis and opioids.  Abdominal ultrasound and CT abdomen pelvis negative.  Admitted to medicine.    Review of Systems     A Comprehensive greater than 10 system review of systems was carried out.  Pertinent positives and negatives  are noted above.  Otherwise negative for contributory information.     Past Medical History     Past Medical History:   Diagnosis Date    Allergic     Anemia     Headache     Hypertension     Mild anemia 04/14/2023    Personal history of diseases of the blood and blood-forming organs and certain disorders involving the immune mechanism     History of sickle cell trait    Personal history of diseases of the skin and subcutaneous tissue 03/07/2017    History of keloid of skin    Personal history of other diseases of urinary system 08/22/2016    History of hematuria    Tonsillitis 12/07/2023     Medications     No current facility-administered medications on file prior to encounter.     Current Outpatient Medications on File Prior to Encounter   Medication Sig Dispense Refill    albuterol (Proventil HFA) 90 mcg/actuation inhaler Inhale 2 puffs every 4 hours if needed for wheezing or shortness of breath. 6.7 g 0    amLODIPine (Norvasc) 10 mg tablet Take 1 tablet (10 mg) by mouth once daily. 90 tablet 0    azithromycin (Zithromax Z-Cristian) 250 mg tablet Take as directed. (Patient not taking: Reported on 1/30/2025) 6 tablet 0    benzonatate (Tessalon) 100 mg capsule Take 1 capsule (100 mg) by mouth 3 times a day as needed for cough. Do not crush or chew. (Patient not taking: Reported on 1/30/2025) 42 capsule 0    busPIRone (Buspar) 15 mg tablet Take 1 tablet (15 mg) by mouth 2 times a day. 180 tablet 0    chlorthalidone (Hygroton) 25 mg tablet Take 1 tablet (25 mg) by mouth once daily. 90 tablet 0    cyanocobalamin (Vitamin B-12) 1,000 mcg tablet Take 1 tablet (1,000 mcg) by mouth once daily. (Patient not taking: Reported on 1/30/2025)      emtricitabine-tenofovir, TDF, (Truvada) 200-300 mg tablet Take 1 tablet by mouth early in the morning.. 90 tablet 1    fluticasone (Flonase) 50 mcg/actuation nasal spray Administer 2 sprays into each nostril once daily. 16 g 2    losartan (Cozaar) 100 mg tablet Take 1 tablet (100 mg) by  "mouth once daily. 90 tablet 0    methocarbamol (Robaxin) 500 mg tablet Take 1 tablet (500 mg) by mouth 4 times a day as needed for muscle spasms. 40 tablet 0    promethazine-DM (Phenergan-DM) 6.25-15 mg/5 mL syrup Take 5 mL by mouth every 4 hours if needed for cough. 120 mL 0    sertraline (Zoloft) 50 mg tablet Take 1/2 tab daily for first 8 days then take 1 tab daily 90 tablet 0    triamcinolone (Kenalog) 0.1 % cream Apply topically 2 times a day. Apply to affected area 1-2 times daily as needed. Avoid face and groin. 30 g 0      Past Surgical History   History reviewed. No pertinent surgical history.     Family History   Reviewed and non-contributory to presenting complaints    Allergies   No Known Allergies    Social History     Social History     Tobacco Use    Smoking status: Never    Smokeless tobacco: Never   Substance Use Topics    Alcohol use: Not Currently     Comment: Socially.     Physical Exam   Blood pressure (!) 159/93, pulse 96, temperature 36.6 °C (97.9 °F), temperature source Temporal, resp. rate 18, height 1.956 m (6' 5\"), weight 117 kg (258 lb), SpO2 99%.    General: Ill appearing, cooperative with exam, in NAD.  HEENT: Atraumatic. No erythema in posterior pharynx.  Lymph: No cervical or inguinal lymphadenopathy.  Cardiac: RRR. No murmurs.  Lungs: CTAB. Nl WOB.  Abd: Mild RUQ tenderness. No rebound or gaurding. Nl bowel sounds.  Ext: No edema. 2+ pulses.  Skin: No rashes, abrasions, or contusions.  Psych: A&Ox3. Nl affect.  Neuro: 5/5 strength. Sensation intact.    Labs & Imaging     Reviewed and Pertinent results discussed in assessment and plan.      "

## 2025-02-01 NOTE — PROGRESS NOTES
"Dena Rivero \"Christina" is a 24 y.o. adult on day 0 of admission presenting with Elevated LFTs.      Subjective   24 y.o. with history of hypertension and sickle cell trait presents with abdominal pain.  Patient has had several recent encounters for this issue started having flulike upper respiratory symptoms in early January.  These went away with patient now with right upper quadrant abdominal pain worse with deep breaths.  Not worse after eating and patient has had regular diet since this pain came on.  Denies any nausea.  Has had what he describes as excessive sweating but no fevers or chills.  No sick contacts.  No recent travel.  Denies any history of IV drug use.  No new medications.  Had 2 ED encounters prior to this where he was found to have elevated LFTs.  Viral respiratory testing negative.  EBV and mono negative.  In the ED this admission, VSS.  .  .  Alk phos 151.  Bilirubin 0.6.  INR 1.2.  UDS with cannabis and opioids.  Abdominal ultrasound and CT abdomen pelvis negative       2/1:             Today the patient is found awake alert and interactive appropriately and appears NAD at the time of visit.  Describes that his abdominal pain is improved though still present.  Appears primarily only pleuritic and with palpation.  He was able to eat today though did feel perhaps his pain slightly increased following that.  No nausea or vomiting.  Plans on eating later in the day.  GI evaluated the patient and checking significant hepatitis/liver studies.  Transaminases did appear to improve over admission today.  Otherwise denies interval new symptoms or complaints including chest pain palpitations pleuritic type pain unusual shortness of breath cough sputum nausea vomiting diarrhea headache fever or chills.       Objective     Last Recorded Vitals  /84 (BP Location: Right arm, Patient Position: Lying)   Pulse 92   Temp 36.4 °C (97.6 °F) (Temporal)   Resp 18   Wt (!) 163 kg (359 lb 5.6 " oz)   SpO2 98%   Intake/Output last 3 Shifts:    Intake/Output Summary (Last 24 hours) at 2/1/2025 1743  Last data filed at 2/1/2025 0926  Gross per 24 hour   Intake 0 ml   Output --   Net 0 ml       Admission Weight  Weight: 129 kg (285 lb) (01/31/25 1537)    Daily Weight  02/01/25 : (!) 163 kg (359 lb 5.6 oz)    Image Results  ECG 12 lead  Pacemaker spikes or artifacts  Sinus rhythm  Baseline wander in lead(s) V5      Physical Exam  General: Ill appearing, cooperative with exam, in NAD.  HEENT: Atraumatic. No erythema in posterior pharynx.  No scleral icterus  Lymph: No cervical or inguinal lymphadenopathy.  Cardiac: RRR. No murmurs.  Lungs: CTAB. Nl WOB.  Abd: Mild RUQ tenderness. No rebound or gaurding. Nl bowel sounds.  Ext: No edema. 2+ pulses.  Skin: No rashes, abrasions, or contusions.  Psych: A&Ox3. Nl affect.  Neuro: 5/5 strength. Sensation intact.  No liver flap  Relevant Results               Assessment/Plan                  Assessment & Plan  Elevated LFTs    Elevated LFTs  -Has had prolonged flulike sx (had URI sx but resolved, still has sweats, now with RUQ abd pain)  -Viral testing including COVID, RSV, COVID, EBV negative  -No history of IV drug use, imaging negative for biliary pathology  -Patient is on Truvada which can elevate LFTs but wouldn't expect abd pain, will hold this  -Cause unclear at this point, suspect prolonged viral illness  -Follow-up viral hepatitis panel  -NPO for GI consultation  2/1: GI evaluated with uncertain etiology for his presentation but checking extensive liver studies.  Pain reported as improved though still present and has been able to eat today.  Continue to monitor.     Essentially Hypertension  -Home medications              Zbigniew Valerio MD

## 2025-02-01 NOTE — CARE PLAN
The patient's goals for the shift include keep safe    The clinical goals for the shift include keep comfortable    Over the shift, the patient did not make progress toward the following goals. Barriers to progression include . Recommendations to address these barriers include .

## 2025-02-02 VITALS
DIASTOLIC BLOOD PRESSURE: 85 MMHG | WEIGHT: 315 LBS | HEIGHT: 77 IN | SYSTOLIC BLOOD PRESSURE: 143 MMHG | BODY MASS INDEX: 37.19 KG/M2 | HEART RATE: 88 BPM | OXYGEN SATURATION: 98 % | TEMPERATURE: 98.3 F | RESPIRATION RATE: 17 BRPM

## 2025-02-02 LAB
ALBUMIN SERPL BCP-MCNC: 3.4 G/DL (ref 3.4–5)
ALP SERPL-CCNC: 153 U/L (ref 33–120)
ALT SERPL W P-5'-P-CCNC: 272 U/L (ref 7–52)
ANION GAP SERPL CALC-SCNC: 11 MMOL/L (ref 10–20)
AST SERPL W P-5'-P-CCNC: 123 U/L (ref 9–39)
BASOPHILS # BLD AUTO: 0.02 X10*3/UL (ref 0–0.1)
BASOPHILS NFR BLD AUTO: 0.3 %
BILIRUB SERPL-MCNC: 0.8 MG/DL (ref 0–1.2)
BUN SERPL-MCNC: 8 MG/DL (ref 6–23)
CALCIUM SERPL-MCNC: 8.8 MG/DL (ref 8.6–10.3)
CERULOPLASMIN SERPL-MCNC: 36.1 MG/DL (ref 20–60)
CHLORIDE SERPL-SCNC: 103 MMOL/L (ref 98–107)
CMV IGG AVIDITY SERPL IA-RTO: REACTIVE %
CO2 SERPL-SCNC: 26 MMOL/L (ref 21–32)
CREAT SERPL-MCNC: 0.73 MG/DL (ref 0.5–1.3)
EGFRCR SERPLBLD CKD-EPI 2021: >90 ML/MIN/1.73M*2
EOSINOPHIL # BLD AUTO: 0.1 X10*3/UL (ref 0–0.7)
EOSINOPHIL NFR BLD AUTO: 1.3 %
ERYTHROCYTE [DISTWIDTH] IN BLOOD BY AUTOMATED COUNT: 11.9 % (ref 11.5–14.5)
GLUCOSE SERPL-MCNC: 78 MG/DL (ref 74–99)
HAV AB SER QL IA: REACTIVE
HCT VFR BLD AUTO: 36.9 % (ref 36–52)
HGB BLD-MCNC: 12 G/DL (ref 12–17.5)
IGA SERPL-MCNC: 306 MG/DL (ref 70–400)
IGG SERPL-MCNC: 2950 MG/DL (ref 700–1600)
IGM SERPL-MCNC: 169 MG/DL (ref 40–230)
IMM GRANULOCYTES # BLD AUTO: 0.03 X10*3/UL (ref 0–0.7)
IMM GRANULOCYTES NFR BLD AUTO: 0.4 % (ref 0–0.9)
INR PPP: 1.2 (ref 0.9–1.1)
LYMPHOCYTES # BLD AUTO: 0.76 X10*3/UL (ref 1.2–4.8)
LYMPHOCYTES NFR BLD AUTO: 10 %
MCH RBC QN AUTO: 26.6 PG (ref 26–34)
MCHC RBC AUTO-ENTMCNC: 32.5 G/DL (ref 32–36)
MCV RBC AUTO: 82 FL (ref 80–100)
MONOCYTES # BLD AUTO: 0.7 X10*3/UL (ref 0.1–1)
MONOCYTES NFR BLD AUTO: 9.2 %
NEUTROPHILS # BLD AUTO: 5.99 X10*3/UL (ref 1.2–7.7)
NEUTROPHILS NFR BLD AUTO: 78.8 %
NRBC BLD-RTO: 0.3 /100 WBCS (ref 0–0)
PLATELET # BLD AUTO: 222 X10*3/UL (ref 150–450)
POTASSIUM SERPL-SCNC: 3.8 MMOL/L (ref 3.5–5.3)
PROT SERPL-MCNC: 8.3 G/DL (ref 6.4–8.2)
PROTHROMBIN TIME: 14 SECONDS (ref 9.8–12.8)
RBC # BLD AUTO: 4.51 X10*6/UL (ref 4–5.9)
SODIUM SERPL-SCNC: 136 MMOL/L (ref 136–145)
WBC # BLD AUTO: 7.6 X10*3/UL (ref 4.4–11.3)

## 2025-02-02 PROCEDURE — 36415 COLL VENOUS BLD VENIPUNCTURE: CPT | Performed by: FAMILY MEDICINE

## 2025-02-02 PROCEDURE — 2500000004 HC RX 250 GENERAL PHARMACY W/ HCPCS (ALT 636 FOR OP/ED): Performed by: FAMILY MEDICINE

## 2025-02-02 PROCEDURE — 84075 ASSAY ALKALINE PHOSPHATASE: CPT | Performed by: FAMILY MEDICINE

## 2025-02-02 PROCEDURE — 99231 SBSQ HOSP IP/OBS SF/LOW 25: CPT | Performed by: INTERNAL MEDICINE

## 2025-02-02 PROCEDURE — 2500000004 HC RX 250 GENERAL PHARMACY W/ HCPCS (ALT 636 FOR OP/ED): Performed by: INTERNAL MEDICINE

## 2025-02-02 PROCEDURE — 85025 COMPLETE CBC W/AUTO DIFF WBC: CPT | Performed by: FAMILY MEDICINE

## 2025-02-02 PROCEDURE — 2500000001 HC RX 250 WO HCPCS SELF ADMINISTERED DRUGS (ALT 637 FOR MEDICARE OP): Performed by: INTERNAL MEDICINE

## 2025-02-02 PROCEDURE — G0378 HOSPITAL OBSERVATION PER HR: HCPCS

## 2025-02-02 PROCEDURE — 99233 SBSQ HOSP IP/OBS HIGH 50: CPT | Performed by: FAMILY MEDICINE

## 2025-02-02 PROCEDURE — 85610 PROTHROMBIN TIME: CPT | Performed by: FAMILY MEDICINE

## 2025-02-02 RX ORDER — HYDROMORPHONE HYDROCHLORIDE 2 MG/1
2 TABLET ORAL EVERY 4 HOURS PRN
Status: DISCONTINUED | OUTPATIENT
Start: 2025-02-02 | End: 2025-02-04 | Stop reason: HOSPADM

## 2025-02-02 RX ADMIN — DIPHENHYDRAMINE HYDROCHLORIDE 25 MG: 50 INJECTION, SOLUTION INTRAMUSCULAR; INTRAVENOUS at 10:50

## 2025-02-02 RX ADMIN — HYDROMORPHONE HYDROCHLORIDE 2 MG: 2 TABLET ORAL at 08:34

## 2025-02-02 RX ADMIN — HYDROMORPHONE HYDROCHLORIDE 0.4 MG: 0.5 INJECTION, SOLUTION INTRAMUSCULAR; INTRAVENOUS; SUBCUTANEOUS at 20:32

## 2025-02-02 ASSESSMENT — PAIN SCALES - GENERAL
PAINLEVEL_OUTOF10: 6
PAINLEVEL_OUTOF10: 8
PAINLEVEL_OUTOF10: 7

## 2025-02-02 ASSESSMENT — COGNITIVE AND FUNCTIONAL STATUS - GENERAL
DAILY ACTIVITIY SCORE: 24
DAILY ACTIVITIY SCORE: 24
MOBILITY SCORE: 24
MOBILITY SCORE: 24

## 2025-02-02 ASSESSMENT — PAIN DESCRIPTION - LOCATION: LOCATION: ABDOMEN

## 2025-02-02 ASSESSMENT — PAIN - FUNCTIONAL ASSESSMENT
PAIN_FUNCTIONAL_ASSESSMENT: 0-10

## 2025-02-02 ASSESSMENT — PAIN DESCRIPTION - ORIENTATION: ORIENTATION: RIGHT;LEFT;MID

## 2025-02-02 NOTE — CARE PLAN
The patient's goals for the shift include keep safe    The clinical goals for the shift include Pt will remain comfortable and free from pain      Problem: Pain - Adult  Goal: Verbalizes/displays adequate comfort level or baseline comfort level  Outcome: Progressing     Problem: Safety - Adult  Goal: Free from fall injury  Outcome: Progressing     Problem: Discharge Planning  Goal: Discharge to home or other facility with appropriate resources  Outcome: Progressing     Problem: Chronic Conditions and Co-morbidities  Goal: Patient's chronic conditions and co-morbidity symptoms are monitored and maintained or improved  Outcome: Progressing     Problem: Nutrition  Goal: Nutrient intake appropriate for maintaining nutritional needs  Outcome: Progressing

## 2025-02-02 NOTE — PROGRESS NOTES
"  [facility] Progress Note    ASSESSMENT and PLAN:       Dena Rivero \"Lad\" is a 24 y.o. adult with a significant past medical history of sickle cell trait, [place], obesity (BMI 42), anxiety, migraines, and HTN who presented with after being told that LFTs were elevated. GI was consulted for \"elevated LFTs\".        Elevated LFTs  LFTs elevated in a primarily hepatocellular pattern. Previously LFTs have been normal, but most recent ones prior to this episode were in December 2023. Suspect that current elevation is secondary to a viral process or medication induced. Azithromycin was given prior to LFT changes. That can be a cause of LFT elevation and while it is most commonly a cholestatic pattern it can be hepatocellular as well. No vidence of any obstructive process on imaging. Lipase is elevated, but imaging shows no evidence of pancreatitis and symptoms are less consistent with pancreatitis. Acute hepatitis panel was negative and LFTs continue to trend down. Work up for other causes of liver injury is largely pending, but does show a positive CMV IgG and positive HSV that likely reflect prior exposure.  - monitor LFTs and INR daily  - additional hepatic work up pending           Marcial Leija MD        Senior Attending Physician, Gastroenterology    [facility]    Clinical   [employer] [facility] [employer]        SUBJECTIVE and INTERVAL HISTORY:       Dena Rivero \"Lad\" says that there is still some discomfort across the upper part of the abdomen. No nausea/vomiting.        Review of systems:     Patient denies any heartburn/GERD, N/V, dysphagia, odynophagia, diarrhea, constipation, hematemesis, hematochezia, melena, or weight loss.    I performed a complete 10 point review of systems and it is negative except as noted in HPI or above.    All other systems have been reviewed and are " "negative.          OBJECTIVE:       Last Recorded Vitals:  Vitals:    02/01/25 2051 02/02/25 0126 02/02/25 0500 02/02/25 0943   BP: (!) 173/95 149/77 152/84 152/84   BP Location: Right arm Right arm Right arm Right arm   Patient Position: Lying Lying Lying Lying   Pulse: 58 88 89 92   Resp: 18 16 18 16   Temp: 37.1 °C (98.7 °F) 37.2 °C (99 °F) 36.9 °C (98.4 °F) 37.1 °C (98.8 °F)   TempSrc: Temporal Temporal Temporal Temporal   SpO2: 96% 97% 99% 98%   Weight:       Height:         /84 (BP Location: Right arm, Patient Position: Lying)   Pulse 92   Temp 37.1 °C (98.8 °F) (Temporal)   Resp 16   Ht 1.956 m (6' 5\")   Wt (!) 163 kg (359 lb 5.6 oz)   SpO2 98%   BMI 42.61 kg/m²      Physical Exam:    Physical Exam  Vitals reviewed.   Constitutional:       General: Lad is not in acute distress.     Appearance: Lad is obese. Lad is not ill-appearing.   HENT:      Head: Normocephalic and atraumatic.   Eyes:      General: No scleral icterus.  Cardiovascular:      Rate and Rhythm: Normal rate and regular rhythm.      Pulses: Normal pulses.      Heart sounds: Normal heart sounds. No murmur heard.  Pulmonary:      Effort: Pulmonary effort is normal. No respiratory distress.      Breath sounds: Normal breath sounds. No wheezing.   Abdominal:      General: Bowel sounds are normal.      Palpations: Abdomen is soft.      Tenderness: There is no abdominal tenderness. There is no rebound.   Musculoskeletal:         General: No swelling or deformity.   Skin:     General: Skin is warm and dry.      Coloration: Skin is not jaundiced.      Findings: No rash.   Neurological:      General: No focal deficit present.      Mental Status: Lad is alert and oriented to person, place, and time.   Psychiatric:         Mood and Affect: Mood normal.         Behavior: Behavior normal.         Thought Content: Thought content normal.         Judgment: Judgment normal.           Inpatient Medications:     PRN medications: acetaminophen **OR** " acetaminophen **OR** acetaminophen, alum-mag hydroxide-simeth, benzocaine-menthol, dextromethorphan-guaifenesin, diphenhydrAMINE, guaiFENesin, HYDROmorphone, melatonin, ondansetron **OR** ondansetron, polyethylene glycol    Outpatient Medications:  Prior to Admission medications    Medication Sig Start Date End Date Taking? Authorizing Provider   albuterol (Proventil HFA) 90 mcg/actuation inhaler Inhale 2 puffs every 4 hours if needed for wheezing or shortness of breath. 1/8/25 1/8/26 Yes June Justin PA-C   amLODIPine (Norvasc) 10 mg tablet Take 1 tablet (10 mg) by mouth once daily. 12/2/24 12/2/25 Yes Katerine Osborne MD   busPIRone (Buspar) 15 mg tablet Take 1 tablet (15 mg) by mouth 2 times a day. 12/2/24 12/2/25 Yes Katerine Osborne MD   chlorthalidone (Hygroton) 25 mg tablet Take 1 tablet (25 mg) by mouth once daily. 12/2/24 12/2/25 Yes Katerine Osborne MD   emtricitabine-tenofovir, TDF, (Truvada) 200-300 mg tablet Take 1 tablet by mouth early in the morning.. 12/13/24  Yes Katerine Osborne MD   fluticasone (Flonase) 50 mcg/actuation nasal spray Administer 2 sprays into each nostril once daily. 6/24/24  Yes DEBORAH Tijerina   losartan (Cozaar) 100 mg tablet Take 1 tablet (100 mg) by mouth once daily. 12/2/24 12/2/25 Yes Katerine Osborne MD   methocarbamol (Robaxin) 500 mg tablet Take 1 tablet (500 mg) by mouth 4 times a day as needed for muscle spasms. 1/30/25 3/31/25 Yes DEBORAH Tijerina   promethazine-DM (Phenergan-DM) 6.25-15 mg/5 mL syrup Take 5 mL by mouth every 4 hours if needed for cough. 1/24/25 2/23/25 Yes Katerine Osborne MD   sertraline (Zoloft) 50 mg tablet Take 1/2 tab daily for first 8 days then take 1 tab daily  Patient taking differently: Take 1 tablet (50 mg) by mouth once daily. 12/2/24   Katerine Osborne MD   azithromycin (Zithromax Z-Cristian) 250 mg tablet Take as directed.  Patient not taking: Reported on 1/30/2025 1/24/25 2/1/25  Katerine Franks  "MD Dylon   benzonatate (Tessalon) 100 mg capsule Take 1 capsule (100 mg) by mouth 3 times a day as needed for cough. Do not crush or chew.  Patient not taking: Reported on 1/30/2025 1/8/25 2/1/25  June Justin PA-C   cyanocobalamin (Vitamin B-12) 1,000 mcg tablet Take 1 tablet (1,000 mcg) by mouth once daily.  Patient not taking: Reported on 1/30/2025 2/1/25  Historical Provider, MD   triamcinolone (Kenalog) 0.1 % cream Apply topically 2 times a day. Apply to affected area 1-2 times daily as needed. Avoid face and groin.  Patient not taking: Reported on 2/1/2025 1/24/25 2/1/25  Katerine Osborne MD       LABS AND IMAGING:     Labs:  Recent labs reviewed in the EMR.    Lab Results   Component Value Date    WBC 7.6 02/02/2025    HGB 12.0 02/02/2025    HGB 11.7 (L) 02/01/2025    HGB 12.4 01/31/2025    MCV 82 02/02/2025     02/02/2025     02/01/2025     01/31/2025    IRON 50 02/01/2025    TIBC 231 (L) 02/01/2025    IRONSAT 22 (L) 02/01/2025    FERRITIN 193 02/01/2025       Lab Results   Component Value Date     02/02/2025    K 3.8 02/02/2025     02/02/2025    BUN 8 02/02/2025    CREATININE 0.73 02/02/2025    CREATININE 0.79 02/01/2025       Lab Results   Component Value Date    BILITOT 0.8 02/02/2025    BILITOT 0.5 02/01/2025    BILITOT 0.6 01/31/2025    BILIDIR 0.0 02/01/2025    ALKPHOS 153 (H) 02/02/2025    ALKPHOS 136 (H) 02/01/2025    ALKPHOS 151 (H) 01/31/2025     (H) 02/02/2025     (H) 02/01/2025     (H) 01/31/2025     (H) 02/02/2025     (H) 02/01/2025     (H) 01/31/2025    LIPASE 188 (H) 01/31/2025       No results found for: \"CRP\", \"CALPS\"      Imaging:  Recent imaging results reviewed.        "

## 2025-02-02 NOTE — CARE PLAN
The patient's goals for the shift include Patient will have adequate pain control    The clinical goals for the shift include Pt will remain comfortable and free from pain

## 2025-02-02 NOTE — PROGRESS NOTES
"Dena Rivero \"Christina" is a 24 y.o. adult on day 0 of admission presenting with Elevated LFTs.      Subjective   24 y.o. with history of hypertension and sickle cell trait presents with abdominal pain.  Patient has had several recent encounters for this issue started having flulike upper respiratory symptoms in early January.  These went away with patient now with right upper quadrant abdominal pain worse with deep breaths.  Not worse after eating and patient has had regular diet since this pain came on.  Denies any nausea.  Has had what he describes as excessive sweating but no fevers or chills.  No sick contacts.  No recent travel.  Denies any history of IV drug use.  No new medications.  Had 2 ED encounters prior to this where he was found to have elevated LFTs.  Viral respiratory testing negative.  EBV and mono negative.  In the ED this admission, VSS.  .  .  Alk phos 151.  Bilirubin 0.6.  INR 1.2.  UDS with cannabis and opioids.  Abdominal ultrasound and CT abdomen pelvis negative       2/1:             Today the patient is found awake alert and interactive appropriately and appears NAD at the time of visit.  Describes that his abdominal pain is improved though still present.  Appears primarily only pleuritic and with palpation.  He was able to eat today though did feel perhaps his pain slightly increased following that.  No nausea or vomiting.  Plans on eating later in the day.  GI evaluated the patient and checking significant hepatitis/liver studies.  Transaminases did appear to improve over admission today.  Otherwise denies interval new symptoms or complaints including chest pain palpitations pleuritic type pain unusual shortness of breath cough sputum nausea vomiting diarrhea headache fever or chills.    2/2:             Today patient remains awake alert and interactive appropriately.  He describes that his abdominal pain although somewhat improved yesterday is back to about the same as " the day before.  Continues to take some diet but not much.  GI continues to follow with multiple liver studies pending.  Transaminases overall continue to trend down. Otherwise denies interval new symptoms or complaints including chest pain palpitations pleuritic type pain unusual shortness of breath cough sputum nausea vomiting diarrhea headache fever or chills.         Objective     Last Recorded Vitals  /83 (BP Location: Right arm, Patient Position: Sitting)   Pulse (!) 111   Temp 36.9 °C (98.5 °F) (Temporal)   Resp 16   Wt (!) 163 kg (359 lb 5.6 oz)   SpO2 96%   Intake/Output last 3 Shifts:    Intake/Output Summary (Last 24 hours) at 2/2/2025 1455  Last data filed at 2/2/2025 1300  Gross per 24 hour   Intake 760 ml   Output --   Net 760 ml       Admission Weight  Weight: 129 kg (285 lb) (01/31/25 1537)    Daily Weight  02/01/25 : (!) 163 kg (359 lb 5.6 oz)    Image Results  ECG 12 lead  Pacemaker spikes or artifacts  Sinus rhythm  Baseline wander in lead(s) V5      Physical Exam  General: Ill appearing, cooperative with exam, in NAD.  HEENT: Atraumatic. No erythema in posterior pharynx.  No scleral icterus  Lymph: No cervical or inguinal lymphadenopathy.  Cardiac: RRR. No murmurs.  Lungs: CTAB. Nl WOB.  Abd: Mild RUQ tenderness. No rebound or gaurding. Nl bowel sounds.  Ext: No edema. 2+ pulses.  Skin: No rashes, abrasions, or contusions.  Psych: A&Ox3. Nl affect.  Neuro: 5/5 strength. Sensation intact.  No liver flap  Relevant Results               Assessment/Plan                  Assessment & Plan  Elevated LFTs    Elevated LFTs  -Has had prolonged flulike sx (had URI sx but resolved, still has sweats, now with RUQ abd pain)  -Viral testing including COVID, RSV, COVID, EBV negative  -No history of IV drug use, imaging negative for biliary pathology  -Patient is on Truvada which can elevate LFTs but wouldn't expect abd pain, will hold this  -Cause unclear at this point, suspect prolonged viral  illness  -Follow-up viral hepatitis panel  -NPO for GI consultation  2/1: GI evaluated with uncertain etiology for his presentation but checking extensive liver studies.  Pain reported as improved though still present and has been able to eat today.    2/2: GI awaiting multiple liver studies pending.  Transaminases appear to be generally trending down.  Pain however not overall improving at this point.  Continue to monitor.     Essentially Hypertension  -Home medications              Zbigniew Valerio MD

## 2025-02-03 ENCOUNTER — APPOINTMENT (OUTPATIENT)
Dept: RADIOLOGY | Facility: CLINIC | Age: 25
End: 2025-02-03
Payer: COMMERCIAL

## 2025-02-03 ENCOUNTER — APPOINTMENT (OUTPATIENT)
Dept: RADIOLOGY | Facility: HOSPITAL | Age: 25
DRG: 442 | End: 2025-02-03
Payer: COMMERCIAL

## 2025-02-03 PROBLEM — R74.8 ELEVATED LIPASE: Status: ACTIVE | Noted: 2025-02-03

## 2025-02-03 LAB
ALBUMIN SERPL BCP-MCNC: 3.6 G/DL (ref 3.4–5)
ALP SERPL-CCNC: 170 U/L (ref 33–120)
ALT SERPL W P-5'-P-CCNC: 248 U/L (ref 7–52)
ANION GAP SERPL CALC-SCNC: 10 MMOL/L (ref 10–20)
AST SERPL W P-5'-P-CCNC: 106 U/L (ref 9–39)
BILIRUB SERPL-MCNC: 0.8 MG/DL (ref 0–1.2)
BUN SERPL-MCNC: 7 MG/DL (ref 6–23)
CALCIUM SERPL-MCNC: 9 MG/DL (ref 8.6–10.3)
CHLORIDE SERPL-SCNC: 103 MMOL/L (ref 98–107)
CO2 SERPL-SCNC: 25 MMOL/L (ref 21–32)
CREAT SERPL-MCNC: 0.73 MG/DL (ref 0.5–1.3)
EGFRCR SERPLBLD CKD-EPI 2021: >90 ML/MIN/1.73M*2
ERYTHROCYTE [DISTWIDTH] IN BLOOD BY AUTOMATED COUNT: 12 % (ref 11.5–14.5)
GLUCOSE SERPL-MCNC: 85 MG/DL (ref 74–99)
HCT VFR BLD AUTO: 36.6 % (ref 36–52)
HGB BLD-MCNC: 12.2 G/DL (ref 12–17.5)
INR PPP: 1.2 (ref 0.9–1.1)
LIPASE SERPL-CCNC: 112 U/L (ref 9–82)
MCH RBC QN AUTO: 26.9 PG (ref 26–34)
MCHC RBC AUTO-ENTMCNC: 33.3 G/DL (ref 32–36)
MCV RBC AUTO: 81 FL (ref 80–100)
NRBC BLD-RTO: 0 /100 WBCS (ref 0–0)
PLATELET # BLD AUTO: 263 X10*3/UL (ref 150–450)
POTASSIUM SERPL-SCNC: 3.7 MMOL/L (ref 3.5–5.3)
PROT SERPL-MCNC: 8.8 G/DL (ref 6.4–8.2)
PROTHROMBIN TIME: 13.9 SECONDS (ref 9.8–12.8)
RBC # BLD AUTO: 4.54 X10*6/UL (ref 4–5.9)
SODIUM SERPL-SCNC: 134 MMOL/L (ref 136–145)
WBC # BLD AUTO: 7.8 X10*3/UL (ref 4.4–11.3)

## 2025-02-03 PROCEDURE — 99233 SBSQ HOSP IP/OBS HIGH 50: CPT | Performed by: HOSPITALIST

## 2025-02-03 PROCEDURE — 99232 SBSQ HOSP IP/OBS MODERATE 35: CPT | Performed by: NURSE PRACTITIONER

## 2025-02-03 PROCEDURE — 83690 ASSAY OF LIPASE: CPT | Performed by: HOSPITALIST

## 2025-02-03 PROCEDURE — 76705 ECHO EXAM OF ABDOMEN: CPT

## 2025-02-03 PROCEDURE — 2500000004 HC RX 250 GENERAL PHARMACY W/ HCPCS (ALT 636 FOR OP/ED): Performed by: INTERNAL MEDICINE

## 2025-02-03 PROCEDURE — 84075 ASSAY ALKALINE PHOSPHATASE: CPT | Performed by: FAMILY MEDICINE

## 2025-02-03 PROCEDURE — 76937 US GUIDE VASCULAR ACCESS: CPT

## 2025-02-03 PROCEDURE — 85027 COMPLETE CBC AUTOMATED: CPT | Performed by: FAMILY MEDICINE

## 2025-02-03 PROCEDURE — 1210000001 HC SEMI-PRIVATE ROOM DAILY

## 2025-02-03 PROCEDURE — 2500000004 HC RX 250 GENERAL PHARMACY W/ HCPCS (ALT 636 FOR OP/ED): Performed by: HOSPITALIST

## 2025-02-03 PROCEDURE — 36415 COLL VENOUS BLD VENIPUNCTURE: CPT | Performed by: FAMILY MEDICINE

## 2025-02-03 PROCEDURE — 2500000001 HC RX 250 WO HCPCS SELF ADMINISTERED DRUGS (ALT 637 FOR MEDICARE OP): Performed by: HOSPITALIST

## 2025-02-03 PROCEDURE — 85610 PROTHROMBIN TIME: CPT | Performed by: FAMILY MEDICINE

## 2025-02-03 RX ORDER — PANTOPRAZOLE SODIUM 40 MG/1
40 TABLET, DELAYED RELEASE ORAL
Status: DISCONTINUED | OUTPATIENT
Start: 2025-02-04 | End: 2025-02-04 | Stop reason: HOSPADM

## 2025-02-03 RX ORDER — LOSARTAN POTASSIUM 25 MG/1
25 TABLET ORAL DAILY
Status: DISCONTINUED | OUTPATIENT
Start: 2025-02-03 | End: 2025-02-04 | Stop reason: HOSPADM

## 2025-02-03 RX ORDER — AMLODIPINE BESYLATE 2.5 MG/1
2.5 TABLET ORAL DAILY
Status: DISCONTINUED | OUTPATIENT
Start: 2025-02-03 | End: 2025-02-04 | Stop reason: HOSPADM

## 2025-02-03 RX ORDER — SODIUM CHLORIDE 9 MG/ML
75 INJECTION, SOLUTION INTRAVENOUS CONTINUOUS
Status: ACTIVE | OUTPATIENT
Start: 2025-02-03 | End: 2025-02-04

## 2025-02-03 RX ADMIN — AMLODIPINE BESYLATE 2.5 MG: 2.5 TABLET ORAL at 18:27

## 2025-02-03 RX ADMIN — HYDROMORPHONE HYDROCHLORIDE 0.4 MG: 0.5 INJECTION, SOLUTION INTRAMUSCULAR; INTRAVENOUS; SUBCUTANEOUS at 02:12

## 2025-02-03 RX ADMIN — LOSARTAN POTASSIUM 25 MG: 25 TABLET, FILM COATED ORAL at 18:27

## 2025-02-03 RX ADMIN — SODIUM CHLORIDE 75 ML/HR: 9 INJECTION, SOLUTION INTRAVENOUS at 11:10

## 2025-02-03 RX ADMIN — HYDROMORPHONE HYDROCHLORIDE 0.4 MG: 0.5 INJECTION, SOLUTION INTRAMUSCULAR; INTRAVENOUS; SUBCUTANEOUS at 18:27

## 2025-02-03 RX ADMIN — SODIUM CHLORIDE 75 ML/HR: 9 INJECTION, SOLUTION INTRAVENOUS at 23:30

## 2025-02-03 ASSESSMENT — PAIN SCALES - GENERAL
PAINLEVEL_OUTOF10: 0 - NO PAIN
PAINLEVEL_OUTOF10: 7
PAINLEVEL_OUTOF10: 8
PAINLEVEL_OUTOF10: 5 - MODERATE PAIN
PAINLEVEL_OUTOF10: 5 - MODERATE PAIN
PAINLEVEL_OUTOF10: 6

## 2025-02-03 ASSESSMENT — PAIN DESCRIPTION - LOCATION
LOCATION: ABDOMEN
LOCATION: ABDOMEN

## 2025-02-03 ASSESSMENT — COGNITIVE AND FUNCTIONAL STATUS - GENERAL
DAILY ACTIVITIY SCORE: 24
MOBILITY SCORE: 24

## 2025-02-03 ASSESSMENT — PAIN - FUNCTIONAL ASSESSMENT
PAIN_FUNCTIONAL_ASSESSMENT: 0-10

## 2025-02-03 ASSESSMENT — PAIN DESCRIPTION - ORIENTATION
ORIENTATION: RIGHT;UPPER;MID
ORIENTATION: RIGHT;LEFT;MID

## 2025-02-03 NOTE — PROGRESS NOTES
Robert Molina [id number]   Service: Internal Medicine / Hospitalist Date of service: 2/3/2025                          Full Code                 Subjective        24 y.o. with history of hypertension and sickle cell trait presents with abdominal pain.  Patient has had several recent encounters for this issue started having flulike upper respiratory symptoms in early January.  These went away with patient now with right upper quadrant abdominal pain worse with deep breaths.  Not worse after eating and patient has had regular diet since this pain came on.  Denies any nausea.  Has had what he describes as excessive sweating but no fevers or chills.  No sick contacts.  No recent travel.  Denies any history of IV drug use.  No new medications.  Had 2 ED encounters prior to this where he was found to have elevated LFTs.  Viral respiratory testing negative.  EBV and mono negative.  In the ED this admission, VSS.  .  .  Alk phos 151.  Bilirubin 0.6.  INR 1.2.  UDS with cannabis and opioids.  Abdominal ultrasound and CT abdomen pelvis negative         2/1:             Today the patient is found awake alert and interactive appropriately and appears NAD at the time of visit.  Describes that his abdominal pain is improved though still present.  Appears primarily only pleuritic and with palpation.  He was able to eat today though did feel perhaps his pain slightly increased following that.  No nausea or vomiting.  Plans on eating later in the day.  GI evaluated the patient and checking significant hepatitis/liver studies.  Transaminases did appear to improve over admission today.  Otherwise denies interval new symptoms or complaints including chest pain palpitations pleuritic type pain unusual shortness of breath cough sputum nausea vomiting diarrhea headache fever or chills.     2/2:             Today patient remains awake alert and interactive appropriately.  He describes that his abdominal pain although  somewhat improved yesterday is back to about the same as the day before.  Continues to take some diet but not much.  GI continues to follow with multiple liver studies pending.  Transaminases overall continue to trend down. Otherwise denies interval new symptoms or complaints including chest pain palpitations pleuritic type pain unusual shortness of breath cough sputum nausea vomiting diarrhea headache fever or chills.    2/3................No reported: Palpitations, headaches, chest pains, nausea, vomiting, fevers or chills.  Patient endorsed right upper quadrant pain.    Review of Systems:   Review of system otherwise negative if not aforementioned above in subjective.    Objective              Physical Exam     Constitutional:       Appearance: Patient appeared in no acute cardiopulmonary distress.     Comments: Patient alert and oriented to person place time and situation.  HEENT:      Head: Normocephalic and atraumatic.Trachea midline      Nose:No observed congestion or rhinorrhea.     Mouth/Throat: Mucous membranes Moist, Trachea appeared  midline.  Eyes:      Extraocular Movements: Extraocular movements intact.      Pupils: Pupils are equal, round, and reactive to light.      Comments: No scleral icterus or conjunctival injection appreciated.   Cardiovascular:      Rate and Rhythm: Normal rate and regular rhythm. No clicks rubs or gallops, normal S1 and S2.No peripheral stigmata of endocarditis appreciated.     Pulmonary:      Lungs appeared clear to auscultation, no adventitious sound appreciated.  Abdominal:      General: Abdomen soft, obese, active bowel sounds, no involuntary guarding or rebound tenderness appreciated.     Comments: Mild right upper quadrant tenderness.  No involuntary guarding or rebound tenderness.  Musculoskeletal:       Patient appeared to have full active range of motion for upper and lower extremities, no acute apparent joint deformity appreciated on examination.   No pitting edema  or cyanosis appreciated.       Lymphadenopathy:      No appreciable palpable lymphadenopathy  Skin:     General: Skin is warm.      Coloration:  No jaundice     Findings: No abnormal appearing skin rashes or lesions that appeared acute noted on unclothed area of the skin..   Neurological:      General: No focal sensory or motor deficits appreciated, no meningeal signs or dysmetria noted.      Cranial Nerves: Cranial nerves II to XII appearing grossly intact.     Genitals:  Deferred  Psychiatric:         The patient appears to be displaying normal mood and affect at the time of evaluation.    Labs:     Lab Results   Component Value Date    GLUCOSE 78 02/02/2025    CALCIUM 8.8 02/02/2025     02/02/2025    K 3.8 02/02/2025    CO2 26 02/02/2025     02/02/2025    BUN 8 02/02/2025    CREATININE 0.73 02/02/2025      Lab Results   Component Value Date    WBC 7.6 02/02/2025    HGB 12.0 02/02/2025    HCT 36.9 02/02/2025    MCV 82 02/02/2025     02/02/2025      [unfilled]   [unfilled]   No results found for the last 90 days.              X-rays/ Images    [unfilled]   US right upper quadrant [[id number]] Collected: 02/03/25 1632   Order Status: Completed Updated: 02/03/25 1632   Narrative:     Interpreted By:  Jimena Munoz,  STUDY:  US RIGHT UPPER QUADRANT;  2/3/2025 4:02 pm      INDICATION:  Signs/Symptoms:worsening RUQ pain.          COMPARISON:  01/31/2025      ACCESSION NUMBER(S):  [id number]      ORDERING CLINICIAN:  NAEL EDGE      TECHNIQUE:  Multiple images of the right upper quadrant were obtained.      FINDINGS:  LIVER:  The liver measures 16.1 cm. Parenchymal echogenicity is coarse. No  focal mass is identified.          GALLBLADDER:  The gallbladder is nondistended, and demonstrates no evidence of  gallstones, wall thickening or surrounding fluid. The gallbladder  wall thickness is 2.6 mm. Sonographic Will's sign is negative.          BILE DUCTS:  No evidence of intra or  extrahepatic biliary dilatation is  identified; the common bile duct measures 0.5 cm.      PANCREAS:  The pancreas is poorly visualized due to overlying bowel gas.      RIGHT KIDNEY:  The right kidney measures 13.0 cm in length. The renal cortical  echogenicity and thickness are within normal limit.  No  hydronephrosis or renal calculi are seen.       Impression:     No finding is identified to explain the patient's pain. Pancreas is  poorly visualized.      MACRO:  None      Signed by: Jimena Munoz 2/3/2025 4:31 PM  Dictation workstation:   [id number]   CT abdomen pelvis w IV contrast [[id number]] Collected: 01/31/25 2155   Order Status: Completed Updated: 01/31/25 2155   Narrative:     Interpreted By:  Gloria Sánchez,  STUDY:  CT ABDOMEN PELVIS W IV CONTRAST;  1/31/2025 9:21 pm      INDICATION:  Signs/Symptoms:Elevated LFTs, elevated lipase, abdominal pain.      COMPARISON:  None.      ACCESSION NUMBER(S):  [id number]      ORDERING CLINICIAN:  NATI HENRIQUEZ      TECHNIQUE:  Axial CT images of the abdomen and pelvis with coronal and sagittal  reconstructed images obtained after intravenous administration of 75  mL of Omnipaque 350      FINDINGS:  LOWER CHEST: No acute abnormality of the lung bases.      ABDOMEN:      LIVER: Within normal limits.  BILE DUCTS: Normal caliber.  GALLBLADDER: No calcified gallstones. No wall thickening.  PANCREAS: Within normal limits.  SPLEEN: Within normal limits.  ADRENALS: Within normal limits.  KIDNEYS and URETERS: Symmetric renal enhancement. No hydronephrosis  or perinephric fluid collection.      VESSELS:   No aortic aneurysm.  RETROPERITONEUM: Nonenlarged periaortic lymph nodes noted.      PELVIS:      REPRODUCTIVE ORGANS: No pelvic masses.  BLADDER: Bladder is underdistended with apparent wall thickening.      BOWEL: No dilated bowel. Incidental note is made of colonic  interposition. Normal appendix. PERITONEUM: No ascites or free air,  no fluid collection.      ABDOMINAL  WALL: Within normal limits.  BONES: No acute osseous abnormality.       Impression:     No acute abdominal or pelvic process.      No peripancreatic inflammatory changes or fluid is seen. Correlate  with laboratory examinationIf there is concern for early acute  pancreatitis.      Mild bladder wall thickening may relate to under distention.  Correlate with symptomatology and urinalysis if there is clinical  concern for cystitis.      Additional findings as described above.      MACRO:  None      Signed by: Gloria Sánchez 1/31/2025 9:53 PM  Dictation workstation:   [id number]    gallbladder [[id number]] Collected: 01/31/25 2014   Order Status: Completed Updated: 01/31/25 2032   Narrative:     STUDY:  Right Upper Quadrant Ultrasound; 1/31/2025 8:08 PM  INDICATION:  Epigastric pain.  Nausea.  Elevated LFT.  COMPARISON:  None Available.  ACCESSION NUMBER(S):  [id number]  ORDERING CLINICIAN:  NATI HENRIQUEZ  TECHNIQUE:  Ultrasound of the Right Upper Quadrant.  FINDINGS:  LIVER:  The liver demonstrates normal echogenicity.       GALLBLADDER:  The gallbladder is anechoic.  There are no stones.  There is no  pericholecystic fluid or wall thickening.  Sonographic Will's sign  is negative.       BILE DUCTS:  The common bile duct measures 0.4 cm.  There is no intrahepatic  biliary dilatation.       PANCREAS:  The pancreas is not well seen due to overlying bowel gas.     RIGHT KIDNEY:  The right kidney measures 11.4 cm in length.  Renal cortical  echotexture is normal.  There is no hydronephrosis.  There are no  stones.  There are no cysts.   Impression:     Unremarkable ultrasound of the right upper quadrant.  No  cholelithiasis, gallbladder wall thickening, or biliary dilatation is  appreciated.  Signed by Andrew Blakely DO   XR chest 2 views [[id number]] Collected: 01/31/25 2026   Order Status: Completed Updated: 01/31/25 2026   Narrative:     Interpreted By:  Domingo France,  STUDY:  XR CHEST 2 VIEWS;   1/31/2025 7:43 pm      INDICATION:  Signs/Symptoms:Chest pain.          COMPARISON:  Radiographs of the chest dated 06/23/2024.      ACCESSION NUMBER(S):  [id number]      ORDERING CLINICIAN:  NATI HENRIQUEZ      FINDINGS:  PA and lateral radiographs of the chest were provided.              CARDIOMEDIASTINAL SILHOUETTE:  Cardiomediastinal silhouette is normal in size and configuration.      LUNGS:  Lungs are clear, without evidence of consolidation or pleural  effusion.      ABDOMEN:  No remarkable upper abdominal findings.      BONES:  No acute osseous changes.       Impression:     1.  No evidence of acute cardiopulmonary process.              MACRO:  None      Signed by: Domingo France 1/31/2025 8:25 PM  Dictation workstation:   [id number]             Medical Problems       Problem List       * (Principal) Elevated LFTs    Benign essential hypertension    Chronic nasal congestion    Migraines    Seasonal allergies    Sickle cell trait (CMS-HCC)    Encounter for pre-exposure prophylaxis for HIV    At risk for HIV due to homosexual contact    Generalized anxiety disorder    B12 deficiency    Morbid obesity with body mass index (BMI) of 40.0 to 44.9 in adult (Multi)    Sleep apnea    Acute upper respiratory infection    Bell's palsy    Myalgia    Transaminitis    Lymph node enlargement               Above medical problems may be reflective of historical medical problems that may have resolved and may not related to acute clinical condition/medical problems.            Clinical impression/plan:    Elevated LFTs  -Has had prolonged flulike sx (had URI sx but resolved, still has sweats, now with RUQ abd pain)  -Viral testing including COVID, RSV, COVID, EBV negative  -No history of IV drug use, imaging negative for biliary pathology  -Patient is on Truvada which can elevate LFTs but wouldn't expect abd pain, will hold this  -Cause unclear at this point, suspect prolonged viral illness  -Follow-up viral hepatitis  panel  -NPO for GI consultation  2/1: GI evaluated with uncertain etiology for his presentation but checking extensive liver studies.  Pain reported as improved though still present and has been able to eat today.    2/2: GI awaiting multiple liver studies pending.  Transaminases appear to be generally trending down.  Pain however not overall improving at this point.  Continue to monitor.        Abdominal pain    - Continue analgesics as needed, no constipation reported, nonacute abdomen appreciated on examination of.         Essentially Hypertension  -Home medications       Disposition/additional care plan/interventions: 2/3/2025    Continue symptom control for abdominal pain.............. nonacute abdomen appreciated on examination.    Possible drug-induced liver injury.............. continue current workup and recommendations as per GI.    Recommend PPI    Repeat lipase level............. while lipase could be representative of pancreatitis symptomatology reported by patient and physical examination does not appear to match.  Have to be mindful of other GI sources/reasons for elevated lipase level.    Gentle fluid hydration    Monitor transaminitis    Repeat lipase    Patient reported no history of HIV reports that he used Truvada for Prep.  Therefore lower suspicion of this CMV, Kaposi's sarcoma, lymphoma etc.    , Peptic ulcer disease recommended starting PPI.    Bladder scan    Monitor blood pressure closely.    Given transaminitis hepatic dosing of Cozaar and Norvasc.  Advance dosing slowly.    Care plan discussed in detail with patient and patient's mother at bedside.    The patient was informed of differential diagnosis , work up , plan of care and possible sequelae of clinical disposition.Patient in agreement with plan of care. Further recommendations forthcoming in accordance with patient's clinical disposition and response to care.    Discharge planning:Anticipate possible discharge in next 24 to 48  hours.    Care time: > 55 mins           Dictation performed with assistance of voice recognition device therefore transcription errors are possible.

## 2025-02-03 NOTE — PROGRESS NOTES
"  [facility] Gastroenterology Progress Note    ASSESSMENT and PLAN:     Dena Rivero \"Lad\" is a 24 y.o. adult with a significant past medical history of sickle cell trait, DAY[place], obesity (BMI 42), anxiety, migraines, and HTN who presented with after being told that LFTs were elevated. GI was consulted for \"elevated LFTs\".         Elevated LFTs, abdominal pain   LFTs elevated in a primarily hepatocellular pattern. Previously LFTs have been normal, but most recent ones prior to this episode were in December 2023. Suspect that current elevation is secondary to a viral process or medication induced. Azithromycin was given prior to LFT changes. That can be a cause of LFT elevation and while it is most commonly a cholestatic pattern it can be hepatocellular as well. No vidence of any obstructive process on imaging. Lipase is elevated, but imaging shows no evidence of pancreatitis and symptoms are less consistent with pancreatitis. Acute hepatitis panel was negative and LFTs continue to trend down overall. Work up for other causes of liver injury has been mostly negative, but does show a positive CMV IgG and positive HSV that likely reflect prior exposure. They complain of worsening RUQ pain today that is aggravated by both food and a deep breath.   - monitor LFTs and INR daily  - additional hepatic work up pending  - recheck RUQ US (ordered)   - start PPI (pantoprazole 40mg daily) have ordered       Case discussed with Dr. Silvia Giles, [facility]        SUBJECTIVE and INTERVAL HISTORY:       Dena Rivero \"Lad\" is a 24 y.o. adult with a significant past medical history of sickle cell trait, DAY[place], obesity (BMI 42), anxiety, migraines, and HTN who presented with after being told that LFTs were elevated. GI was consulted for \"elevated LFTs\".       HPI:  Patient reporting worsening abdominal pain that is diffuse across the upper abdomen, but worst at the RUQ that is aggravated with a deep breath and with " "food. Pain radiates to right shoulder. No N/V, melena, or hematochezia.         Review of systems:     I performed a complete 10 point review of systems and it is negative except as noted in HPI or above.    All other systems have been reviewed and are negative.          OBJECTIVE:       Last Recorded Vitals:  Vitals:    02/02/25 2137 02/03/25 0208 02/03/25 0458 02/03/25 0924   BP: 143/85 160/87 163/81 151/78   BP Location: Right arm Right arm Right arm Right arm   Patient Position: Lying Lying Lying Lying   Pulse: 88 84 83 98   Resp: 17 17 17 18   Temp: 36.8 °C (98.3 °F) 36.9 °C (98.4 °F) 36.4 °C (97.5 °F) 37 °C (98.6 °F)   TempSrc: Temporal Temporal Temporal Temporal   SpO2: 98% 99% 100% 100%   Weight:       Height:         /78 (BP Location: Right arm, Patient Position: Lying)   Pulse 98   Temp 37 °C (98.6 °F) (Temporal)   Resp 18   Ht 1.956 m (6' 5\")   Wt (!) 163 kg (359 lb 5.6 oz)   SpO2 100%   BMI 42.61 kg/m²      Physical Exam:    Physical Exam  Constitutional:       General: Lad is not in acute distress.     Appearance: Normal appearance.   HENT:      Mouth/Throat:      Mouth: Mucous membranes are moist.      Comments: pink  Eyes:      Conjunctiva/sclera: Conjunctivae normal.      Pupils: Pupils are equal, round, and reactive to light.   Cardiovascular:      Rate and Rhythm: Normal rate and regular rhythm.      Heart sounds: No murmur heard.  Pulmonary:      Effort: Pulmonary effort is normal.      Breath sounds: Normal breath sounds.   Abdominal:      General: Bowel sounds are normal. There is no distension.      Palpations: Abdomen is soft.      Tenderness: There is abdominal tenderness (RUQ). There is no guarding.   Skin:     General: Skin is warm and dry.      Coloration: Skin is not jaundiced.   Neurological:      Mental Status: Lad is alert and oriented to person, place, and time.   Psychiatric:         Mood and Affect: Mood normal.         Behavior: Behavior normal.           Inpatient " Medications:  [START ON 2/4/2025] pantoprazole, 40 mg, oral, Daily before breakfast      PRN medications: acetaminophen **OR** acetaminophen **OR** acetaminophen, alum-mag hydroxide-simeth, benzocaine-menthol, dextromethorphan-guaifenesin, diphenhydrAMINE, guaiFENesin, HYDROmorphone, HYDROmorphone, melatonin, ondansetron **OR** ondansetron, polyethylene glycol    Outpatient Medications:  Prior to Admission medications    Medication Sig Start Date End Date Taking? Authorizing Provider   albuterol (Proventil HFA) 90 mcg/actuation inhaler Inhale 2 puffs every 4 hours if needed for wheezing or shortness of breath. 1/8/25 1/8/26 Yes June Justin PA-C   amLODIPine (Norvasc) 10 mg tablet Take 1 tablet (10 mg) by mouth once daily. 12/2/24 12/2/25 Yes Katerine Osborne MD   busPIRone (Buspar) 15 mg tablet Take 1 tablet (15 mg) by mouth 2 times a day. 12/2/24 12/2/25 Yes Katerine Osborne MD   chlorthalidone (Hygroton) 25 mg tablet Take 1 tablet (25 mg) by mouth once daily. 12/2/24 12/2/25 Yes Katerine Osborne MD   emtricitabine-tenofovir, TDF, (Truvada) 200-300 mg tablet Take 1 tablet by mouth early in the morning.. 12/13/24  Yes Katerine Osborne MD   fluticasone (Flonase) 50 mcg/actuation nasal spray Administer 2 sprays into each nostril once daily. 6/24/24  Yes DEBORAH Tijerina   losartan (Cozaar) 100 mg tablet Take 1 tablet (100 mg) by mouth once daily. 12/2/24 12/2/25 Yes Katerine Osborne MD   methocarbamol (Robaxin) 500 mg tablet Take 1 tablet (500 mg) by mouth 4 times a day as needed for muscle spasms. 1/30/25 3/31/25 Yes DEBORAH Tijerina   promethazine-DM (Phenergan-DM) 6.25-15 mg/5 mL syrup Take 5 mL by mouth every 4 hours if needed for cough. 1/24/25 2/23/25 Yes Katerine Osborne MD   sertraline (Zoloft) 50 mg tablet Take 1/2 tab daily for first 8 days then take 1 tab daily  Patient taking differently: Take 1 tablet (50 mg) by mouth once daily. 12/2/24   Katerine Osborne,  "MD   azithromycin (Zithromax Z-Cristian) 250 mg tablet Take as directed.  Patient not taking: Reported on 1/30/2025 1/24/25 2/1/25  Katerine Osborne MD   benzonatate (Tessalon) 100 mg capsule Take 1 capsule (100 mg) by mouth 3 times a day as needed for cough. Do not crush or chew.  Patient not taking: Reported on 1/30/2025 1/8/25 2/1/25  June Justin PA-C   cyanocobalamin (Vitamin B-12) 1,000 mcg tablet Take 1 tablet (1,000 mcg) by mouth once daily.  Patient not taking: Reported on 1/30/2025 2/1/25  Historical Provider, MD   triamcinolone (Kenalog) 0.1 % cream Apply topically 2 times a day. Apply to affected area 1-2 times daily as needed. Avoid face and groin.  Patient not taking: Reported on 2/1/2025 1/24/25 2/1/25  Katerine Osborne MD       LABS AND IMAGING:     Labs:  Recent labs reviewed in the EMR.    Lab Results   Component Value Date    WBC 7.8 02/03/2025    HGB 12.2 02/03/2025    HGB 12.0 02/02/2025    HGB 11.7 (L) 02/01/2025    MCV 81 02/03/2025     02/03/2025     02/02/2025     02/01/2025    IRON 50 02/01/2025    TIBC 231 (L) 02/01/2025    IRONSAT 22 (L) 02/01/2025    FERRITIN 193 02/01/2025       Lab Results   Component Value Date     (L) 02/03/2025    K 3.7 02/03/2025     02/03/2025    BUN 7 02/03/2025    CREATININE 0.73 02/03/2025    CREATININE 0.73 02/02/2025       Lab Results   Component Value Date    BILITOT 0.8 02/03/2025    BILITOT 0.8 02/02/2025    BILITOT 0.5 02/01/2025    BILIDIR 0.0 02/01/2025    ALKPHOS 170 (H) 02/03/2025    ALKPHOS 153 (H) 02/02/2025    ALKPHOS 136 (H) 02/01/2025     (H) 02/03/2025     (H) 02/02/2025     (H) 02/01/2025     (H) 02/03/2025     (H) 02/02/2025     (H) 02/01/2025    LIPASE 112 (H) 02/03/2025       No results found for: \"CRP\", \"CALPS\"      Imaging:  Recent imaging results reviewed.       "

## 2025-02-03 NOTE — CARE PLAN
The patient's goals for the shift include keep safe    The clinical goals for the shift include Patient will have adequate pan control    Over the shift, the patient did not make progress toward the following goals. Barriers to progression include . Recommendations to address these barriers include .

## 2025-02-03 NOTE — CONSULTS
Vascular Access Team  Consult     Visit Date: 2/3/2025      Patient Name: Robert Rivero         MRN: [id number]                Reason for Consult: Called to bedside for difficult IV access      Plan: US guided IV placed in left forearm       Lisa Fitzgerald RN  2/3/2025  1:12 PM

## 2025-02-03 NOTE — CARE PLAN
The patient's goals for the shift include keep safe    The clinical goals for the shift include Patient will have adequate pan control      Problem: Pain - Adult  Goal: Verbalizes/displays adequate comfort level or baseline comfort level  Outcome: Progressing     Problem: Safety - Adult  Goal: Free from fall injury  Outcome: Progressing     Problem: Discharge Planning  Goal: Discharge to home or other facility with appropriate resources  Outcome: Progressing     Problem: Chronic Conditions and Co-morbidities  Goal: Patient's chronic conditions and co-morbidity symptoms are monitored and maintained or improved  Outcome: Progressing     Problem: Nutrition  Goal: Nutrient intake appropriate for maintaining nutritional needs  Outcome: Progressing

## 2025-02-04 VITALS
SYSTOLIC BLOOD PRESSURE: 132 MMHG | WEIGHT: 315 LBS | DIASTOLIC BLOOD PRESSURE: 81 MMHG | TEMPERATURE: 97.3 F | BODY MASS INDEX: 37.19 KG/M2 | HEIGHT: 77 IN | HEART RATE: 92 BPM | RESPIRATION RATE: 16 BRPM | OXYGEN SATURATION: 98 %

## 2025-02-04 LAB
ALBUMIN SERPL BCP-MCNC: 3.4 G/DL (ref 3.4–5)
ALP SERPL-CCNC: 161 U/L (ref 33–120)
ALT SERPL W P-5'-P-CCNC: 209 U/L (ref 7–52)
ANA PATTERN: ABNORMAL
ANA SER QL HEP2 SUBST: POSITIVE
ANA TITR SER IF: ABNORMAL {TITER}
ANION GAP SERPL CALC-SCNC: 9 MMOL/L (ref 10–20)
AST SERPL W P-5'-P-CCNC: 91 U/L (ref 9–39)
BILIRUB SERPL-MCNC: 1 MG/DL (ref 0–1.2)
BUN SERPL-MCNC: 5 MG/DL (ref 6–23)
CALCIUM SERPL-MCNC: 8.8 MG/DL (ref 8.6–10.3)
CENTROMERE B AB SER-ACNC: <0.2 AI
CHLORIDE SERPL-SCNC: 105 MMOL/L (ref 98–107)
CHROMATIN AB SERPL-ACNC: <0.2 AI
CMV IGM SERPL-ACNC: 150 AU/ML
CO2 SERPL-SCNC: 25 MMOL/L (ref 21–32)
CREAT SERPL-MCNC: 0.73 MG/DL (ref 0.5–1.3)
CYTOPLASMIC PATTERN: PRESENT
DSDNA AB SER-ACNC: 1 IU/ML
EGFRCR SERPLBLD CKD-EPI 2021: >90 ML/MIN/1.73M*2
ENA JO1 AB SER QL IA: <0.2 AI
ENA RNP AB SER IA-ACNC: 0.8 AI
ENA SCL70 AB SER QL IA: 0.3 AI
ENA SM AB SER IA-ACNC: 0.2 AI
ENA SM+RNP AB SER QL IA: <0.2 AI
ENA SS-A AB SER IA-ACNC: <0.2 AI
ENA SS-B AB SER IA-ACNC: <0.2 AI
ERYTHROCYTE [DISTWIDTH] IN BLOOD BY AUTOMATED COUNT: 11.9 % (ref 11.5–14.5)
GLUCOSE SERPL-MCNC: 82 MG/DL (ref 74–99)
HCT VFR BLD AUTO: 35.9 % (ref 36–52)
HGB BLD-MCNC: 11.8 G/DL (ref 12–17.5)
LKM AB TITR SER IF: NORMAL {TITER}
MCH RBC QN AUTO: 26.8 PG (ref 26–34)
MCHC RBC AUTO-ENTMCNC: 32.9 G/DL (ref 32–36)
MCV RBC AUTO: 82 FL (ref 80–100)
MITOCHONDRIA AB SER QL IF: NEGATIVE
NRBC BLD-RTO: 0 /100 WBCS (ref 0–0)
PLATELET # BLD AUTO: 242 X10*3/UL (ref 150–450)
POTASSIUM SERPL-SCNC: 3.9 MMOL/L (ref 3.5–5.3)
PROT SERPL-MCNC: 8.4 G/DL (ref 6.4–8.2)
RBC # BLD AUTO: 4.4 X10*6/UL (ref 4–5.9)
RIBOSOMAL P AB SER-ACNC: 0.3 AI
SMOOTH MUSCLE AB SER QL IF: ABNORMAL
SODIUM SERPL-SCNC: 135 MMOL/L (ref 136–145)
SOLUBLE LIVER IGG SER IA-ACNC: 0.9 U (ref 0–24.9)
WBC # BLD AUTO: 7.3 X10*3/UL (ref 4.4–11.3)

## 2025-02-04 PROCEDURE — 2500000001 HC RX 250 WO HCPCS SELF ADMINISTERED DRUGS (ALT 637 FOR MEDICARE OP): Performed by: HOSPITALIST

## 2025-02-04 PROCEDURE — 85027 COMPLETE CBC AUTOMATED: CPT | Performed by: HOSPITALIST

## 2025-02-04 PROCEDURE — 2500000001 HC RX 250 WO HCPCS SELF ADMINISTERED DRUGS (ALT 637 FOR MEDICARE OP): Performed by: NURSE PRACTITIONER

## 2025-02-04 PROCEDURE — 99232 SBSQ HOSP IP/OBS MODERATE 35: CPT | Performed by: NURSE PRACTITIONER

## 2025-02-04 PROCEDURE — 36415 COLL VENOUS BLD VENIPUNCTURE: CPT | Performed by: HOSPITALIST

## 2025-02-04 PROCEDURE — 99239 HOSP IP/OBS DSCHRG MGMT >30: CPT | Performed by: HOSPITALIST

## 2025-02-04 PROCEDURE — 80053 COMPREHEN METABOLIC PANEL: CPT | Performed by: HOSPITALIST

## 2025-02-04 RX ORDER — AMLODIPINE BESYLATE 5 MG/1
5 TABLET ORAL DAILY
Qty: 30 TABLET | Refills: 0 | Status: SHIPPED | OUTPATIENT
Start: 2025-02-05

## 2025-02-04 RX ORDER — LOSARTAN POTASSIUM 25 MG/1
25 TABLET ORAL DAILY
Qty: 30 TABLET | Refills: 0 | Status: SHIPPED | OUTPATIENT
Start: 2025-02-05 | End: 2025-03-07

## 2025-02-04 RX ORDER — PANTOPRAZOLE SODIUM 40 MG/1
40 TABLET, DELAYED RELEASE ORAL
Qty: 30 TABLET | Refills: 0 | Status: SHIPPED | OUTPATIENT
Start: 2025-02-05 | End: 2025-03-07

## 2025-02-04 RX ADMIN — PANTOPRAZOLE SODIUM 40 MG: 40 TABLET, DELAYED RELEASE ORAL at 06:27

## 2025-02-04 RX ADMIN — AMLODIPINE BESYLATE 2.5 MG: 2.5 TABLET ORAL at 08:09

## 2025-02-04 RX ADMIN — LOSARTAN POTASSIUM 25 MG: 25 TABLET, FILM COATED ORAL at 08:09

## 2025-02-04 ASSESSMENT — COGNITIVE AND FUNCTIONAL STATUS - GENERAL
MOBILITY SCORE: 24
DAILY ACTIVITIY SCORE: 24

## 2025-02-04 ASSESSMENT — PAIN DESCRIPTION - DESCRIPTORS: DESCRIPTORS: ACHING;SORE

## 2025-02-04 ASSESSMENT — PAIN - FUNCTIONAL ASSESSMENT: PAIN_FUNCTIONAL_ASSESSMENT: 0-10

## 2025-02-04 ASSESSMENT — PAIN SCALES - GENERAL: PAINLEVEL_OUTOF10: 4

## 2025-02-04 NOTE — DISCHARGE INSTRUCTIONS
Patient should seek medical attention immediately if condition should worsen, new symptoms develop , no further improvement or recurrence of presenting symptomatology, patient warned.    Please follow-up with family physician concerning Truvada stoppage and symptomatology.    Follow-up with GI as scheduled.    Please follow-up with your primary care physician as scheduled for this coming Thursday    In light of fear transaminitis/elevated liver enzymes, your Norvasc and Cozaar dosage were decreased.  Your family physician can increase your dose  back to your baseline dosage based on your liver function/aminotransferase levels.

## 2025-02-04 NOTE — CARE PLAN
The patient's goals for the shift include keep safe    The clinical goals for the shift include adequate pain control.

## 2025-02-04 NOTE — PROGRESS NOTES
Patient was upgraded to inpatient. Started on PPI, still with abdominal pain. GI on consult. Plan remains to discharge home when med ready. TCC to follow.

## 2025-02-04 NOTE — DISCHARGE SUMMARY
Discharge Summary    Dena Rivero    [id number]     1/31/2025  7:18 PM , admit date    2/4/2025,discharge date      .      Discharge Diagnosis:      1.  Transaminitis    2.  Drug-induced liver injury, suspected    3.  Hypertension    4.  Abdominal pain    Problem List Items Addressed This Visit             ICD-10-CM       Gastrointestinal and Abdominal    * (Principal) Elevated LFTs R79.89    Elevated lipase - Primary R74.8     Other Visit Diagnoses         Codes    Abdominal pain, unspecified abdominal location     R10.9               Hospital Course/Progress note on the date of  discharge.    Robert Molina [id number]   Service: Internal Medicine / Hospitalist Date of service: 2/4/2025                                  Full Code         Subjective          24 y.o. with history of hypertension and sickle cell trait presents with abdominal pain.  Patient has had several recent encounters for this issue started having flulike upper respiratory symptoms in early January.  These went away with patient now with right upper quadrant abdominal pain worse with deep breaths.  Not worse after eating and patient has had regular diet since this pain came on.  Denies any nausea.  Has had what he describes as excessive sweating but no fevers or chills.  No sick contacts.  No recent travel.  Denies any history of IV drug use.  No new medications.  Had 2 ED encounters prior to this where he was found to have elevated LFTs.  Viral respiratory testing negative.  EBV and mono negative.  In the ED this admission, VSS.  .  .  Alk phos 151.  Bilirubin 0.6.  INR 1.2.  UDS with cannabis and opioids.  Abdominal ultrasound and CT abdomen pelvis negative         2/1:             Today the patient is found awake alert and interactive appropriately and appears NAD at the time of visit.  Describes that his abdominal pain is improved though still present.  Appears primarily only pleuritic and with palpation.  He was able  to eat today though did feel perhaps his pain slightly increased following that.  No nausea or vomiting.  Plans on eating later in the day.  GI evaluated the patient and checking significant hepatitis/liver studies.  Transaminases did appear to improve over admission today.  Otherwise denies interval new symptoms or complaints including chest pain palpitations pleuritic type pain unusual shortness of breath cough sputum nausea vomiting diarrhea headache fever or chills.     2/2:             Today patient remains awake alert and interactive appropriately.  He describes that his abdominal pain although somewhat improved yesterday is back to about the same as the day before.  Continues to take some diet but not much.  GI continues to follow with multiple liver studies pending.  Transaminases overall continue to trend down. Otherwise denies interval new symptoms or complaints including chest pain palpitations pleuritic type pain unusual shortness of breath cough sputum nausea vomiting diarrhea headache fever or chills.     2/3................No reported: Palpitations, headaches, chest pains, nausea, vomiting, fevers or chills.  Patient endorsed right upper quadrant pain.     2/4................No reported: Emesis, fevers, chills, chest pains, flank pain, dysuria, palpitations or dyspnea.  Patient reported feeling better today requesting discharge home.     Review of Systems:   Review of system otherwise negative if not aforementioned above in subjective.     Objective                    Physical Exam      Constitutional:       Appearance: Patient appeared in no acute cardiopulmonary distress.     Comments: Patient alert and oriented to person place time and situation.The patient appeared nontoxic.  HEENT:      Head: Normocephalic and atraumatic.Trachea midline      Nose:No observed congestion or rhinorrhea.     Mouth/Throat: Mucous membranes Moist, Trachea appeared  midline.  Eyes:      Extraocular Movements:  Extraocular movements intact.      Pupils: Pupils are equal, round, and reactive to light.      Comments: No scleral icterus or conjunctival injection appreciated.   Cardiovascular:      Rate and Rhythm: Normal rate and regular rhythm. No clicks rubs or gallops, normal S1 and S2.No peripheral stigmata of endocarditis appreciated.     Pulmonary:      Lungs appeared clear to auscultation, no adventitious sound appreciated.  Abdominal:      General: Abdomen soft, obese, active bowel sounds, no involuntary guarding or rebound tenderness appreciated.     Comments: No overt tenderness appreciated on examination at this time.No involuntary guarding or rebound tenderness.  Musculoskeletal:       Patient appeared to have full active range of motion for upper and lower extremities, no acute apparent joint deformity appreciated on examination.   No pitting edema or cyanosis appreciated.       Lymphadenopathy:      No appreciable palpable lymphadenopathy  Skin:     General: Skin is warm.      Coloration:  No jaundice     Findings: No abnormal appearing skin rashes or lesions that appeared acute noted on unclothed area of the skin..   Neurological:      General: No focal sensory or motor deficits appreciated, no meningeal signs or dysmetria noted.      Cranial Nerves: Cranial nerves II to XII appearing grossly intact.     Genitals:  Deferred  Psychiatric:         The patient appears to be displaying normal mood and affect at the time of evaluation.     Labs:               Lab Results   Component Value Date     GLUCOSE 78 02/02/2025     CALCIUM 8.8 02/02/2025      02/02/2025     K 3.8 02/02/2025     CO2 26 02/02/2025      02/02/2025     BUN 8 02/02/2025     CREATININE 0.73 02/02/2025                Lab Results   Component Value Date     WBC 7.6 02/02/2025     HGB 12.0 02/02/2025     HCT 36.9 02/02/2025     MCV 82 02/02/2025      02/02/2025            Lab Results   Component Value Date     GLUCOSE 82 02/04/2025      CALCIUM 8.8 02/04/2025      (L) 02/04/2025     K 3.9 02/04/2025     CO2 25 02/04/2025      02/04/2025     BUN 5 (L) 02/04/2025     CREATININE 0.73 02/04/2025            Lab Results   Component Value Date     WBC 7.3 02/04/2025     HGB 11.8 (L) 02/04/2025     HCT 35.9 (L) 02/04/2025     MCV 82 02/04/2025      02/04/2025      [unfilled]   [unfilled]   No results found for the last 90 days.                  X-rays/ Images     [unfilled]   US right upper quadrant [[id number]] Collected: 02/03/25 1632   Order Status: Completed Updated: 02/03/25 1632   Narrative:     Interpreted By:  Jimena Munoz,  STUDY:  US RIGHT UPPER QUADRANT;  2/3/2025 4:02 pm      INDICATION:  Signs/Symptoms:worsening RUQ pain.          COMPARISON:  01/31/2025      ACCESSION NUMBER(S):  [id number]      ORDERING CLINICIAN:  NAEL EDGE      TECHNIQUE:  Multiple images of the right upper quadrant were obtained.      FINDINGS:  LIVER:  The liver measures 16.1 cm. Parenchymal echogenicity is coarse. No  focal mass is identified.          GALLBLADDER:  The gallbladder is nondistended, and demonstrates no evidence of  gallstones, wall thickening or surrounding fluid. The gallbladder  wall thickness is 2.6 mm. Sonographic Will's sign is negative.          BILE DUCTS:  No evidence of intra or extrahepatic biliary dilatation is  identified; the common bile duct measures 0.5 cm.      PANCREAS:  The pancreas is poorly visualized due to overlying bowel gas.      RIGHT KIDNEY:  The right kidney measures 13.0 cm in length. The renal cortical  echogenicity and thickness are within normal limit.  No  hydronephrosis or renal calculi are seen.       Impression:     No finding is identified to explain the patient's pain. Pancreas is  poorly visualized.      MACRO:  None      Signed by: Jimena Munoz 2/3/2025 4:31 PM  Dictation workstation:   [id number]   CT abdomen pelvis w IV contrast [[id number]] Collected: 01/31/25  2155   Order Status: Completed Updated: 01/31/25 2155   Narrative:     Interpreted By:  Gloria Sánchez,  STUDY:  CT ABDOMEN PELVIS W IV CONTRAST;  1/31/2025 9:21 pm      INDICATION:  Signs/Symptoms:Elevated LFTs, elevated lipase, abdominal pain.      COMPARISON:  None.      ACCESSION NUMBER(S):  [id number]      ORDERING CLINICIAN:  NATI HENRIQUEZ      TECHNIQUE:  Axial CT images of the abdomen and pelvis with coronal and sagittal  reconstructed images obtained after intravenous administration of 75  mL of Omnipaque 350      FINDINGS:  LOWER CHEST: No acute abnormality of the lung bases.      ABDOMEN:      LIVER: Within normal limits.  BILE DUCTS: Normal caliber.  GALLBLADDER: No calcified gallstones. No wall thickening.  PANCREAS: Within normal limits.  SPLEEN: Within normal limits.  ADRENALS: Within normal limits.  KIDNEYS and URETERS: Symmetric renal enhancement. No hydronephrosis  or perinephric fluid collection.      VESSELS:   No aortic aneurysm.  RETROPERITONEUM: Nonenlarged periaortic lymph nodes noted.      PELVIS:      REPRODUCTIVE ORGANS: No pelvic masses.  BLADDER: Bladder is underdistended with apparent wall thickening.      BOWEL: No dilated bowel. Incidental note is made of colonic  interposition. Normal appendix. PERITONEUM: No ascites or free air,  no fluid collection.      ABDOMINAL WALL: Within normal limits.  BONES: No acute osseous abnormality.       Impression:     No acute abdominal or pelvic process.      No peripancreatic inflammatory changes or fluid is seen. Correlate  with laboratory examinationIf there is concern for early acute  pancreatitis.      Mild bladder wall thickening may relate to under distention.  Correlate with symptomatology and urinalysis if there is clinical  concern for cystitis.      Additional findings as described above.      MACRO:  None      Signed by: Gloria Sánchez 1/31/2025 9:53 PM  Dictation workstation:   [id number]    gallbladder [[id number]] Collected:  01/31/25 2014   Order Status: Completed Updated: 01/31/25 2032   Narrative:     STUDY:  Right Upper Quadrant Ultrasound; 1/31/2025 8:08 PM  INDICATION:  Epigastric pain.  Nausea.  Elevated LFT.  COMPARISON:  None Available.  ACCESSION NUMBER(S):  [id number]  ORDERING CLINICIAN:  NATI HENRIQUEZ  TECHNIQUE:  Ultrasound of the Right Upper Quadrant.  FINDINGS:  LIVER:  The liver demonstrates normal echogenicity.       GALLBLADDER:  The gallbladder is anechoic.  There are no stones.  There is no  pericholecystic fluid or wall thickening.  Sonographic Will's sign  is negative.       BILE DUCTS:  The common bile duct measures 0.4 cm.  There is no intrahepatic  biliary dilatation.       PANCREAS:  The pancreas is not well seen due to overlying bowel gas.     RIGHT KIDNEY:  The right kidney measures 11.4 cm in length.  Renal cortical  echotexture is normal.  There is no hydronephrosis.  There are no  stones.  There are no cysts.   Impression:     Unremarkable ultrasound of the right upper quadrant.  No  cholelithiasis, gallbladder wall thickening, or biliary dilatation is  appreciated.  Signed by Andrew Blakely DO   XR chest 2 views [[id number]] Collected: 01/31/25 2026   Order Status: Completed Updated: 01/31/25 2026   Narrative:     Interpreted By:  Domingo France,  STUDY:  XR CHEST 2 VIEWS;  1/31/2025 7:43 pm      INDICATION:  Signs/Symptoms:Chest pain.          COMPARISON:  Radiographs of the chest dated 06/23/2024.      ACCESSION NUMBER(S):  [id number]      ORDERING CLINICIAN:  NATI HENRIQUEZ      FINDINGS:  PA and lateral radiographs of the chest were provided.              CARDIOMEDIASTINAL SILHOUETTE:  Cardiomediastinal silhouette is normal in size and configuration.      LUNGS:  Lungs are clear, without evidence of consolidation or pleural  effusion.      ABDOMEN:  No remarkable upper abdominal findings.      BONES:  No acute osseous changes.       Impression:     1.  No evidence of acute cardiopulmonary  process.              MACRO:  None      Signed by: Domingo France 1/31/2025 8:25 PM  Dictation workstation:   [id number]                  Medical Problems         Problem List         * (Principal) Elevated LFTs     Benign essential hypertension     Chronic nasal congestion     Migraines     Seasonal allergies     Sickle cell trait (CMS-HCC)     Encounter for pre-exposure prophylaxis for HIV     At risk for HIV due to homosexual contact     Generalized anxiety disorder     B12 deficiency     Morbid obesity with body mass index (BMI) of 40.0 to 44.9 in adult (Multi)     Sleep apnea     Acute upper respiratory infection     Bell's palsy     Myalgia     Transaminitis     Lymph node enlargement                  Above medical problems may be reflective of historical medical problems that may have resolved and may not related to acute clinical condition/medical problems.                 Clinical impression/plan:     Elevated LFTs  -Has had prolonged flulike sx (had URI sx but resolved, still has sweats, now with RUQ abd pain)  -Viral testing including COVID, RSV, COVID, EBV negative  -No history of IV drug use, imaging negative for biliary pathology  -Patient is on Truvada which can elevate LFTs but wouldn't expect abd pain, will hold this  -Cause unclear at this point, suspect prolonged viral illness  -Follow-up viral hepatitis panel  -NPO for GI consultation  2/1: GI evaluated with uncertain etiology for his presentation but checking extensive liver studies.  Pain reported as improved though still present and has been able to eat today.    2/2: GI awaiting multiple liver studies pending.  Transaminases appear to be generally trending down.  Pain however not overall improving at this point.  Continue to monitor.           Abdominal pain     - Continue analgesics as needed, no constipation reported, nonacute abdomen appreciated on examination of.           Essentially Hypertension  -Home medications         Disposition/additional care plan/interventions: 2/3/2025     Continue symptom control for abdominal pain.............. nonacute abdomen appreciated on examination.     Possible drug-induced liver injury.............. continue current workup and recommendations as per GI.     Recommend PPI     Repeat lipase level............. while lipase could be representative of pancreatitis symptomatology reported by patient and physical examination does not appear to match.  Have to be mindful of other GI sources/reasons for elevated lipase level.     Gentle fluid hydration     Monitor transaminitis     Repeat lipase     Patient reported no history of HIV reports that he used Truvada for Prep.  Therefore lower suspicion of this CMV, Kaposi's sarcoma, lymphoma etc.     , Peptic ulcer disease recommended starting PPI.     Bladder scan     Monitor blood pressure closely.     Given transaminitis hepatic dosing of Cozaar and Norvasc.  Advance dosing slowly.     Care plan discussed in detail with patient and patient's mother at bedside.      Disposition/additional care plan/interventions: 2/4/2025     Clinical suspicion of drug induced liver injury.  This is somewhat of a challenging diagnosis which relies on exclusion of other causes.  Continue workup as per gastroenterology.  Appears to be a temporal relation between drug exposure/Truvada and liver injury.     Continue supportive care           Improved blood pressure with hepatic dosing of Cozaar and Norvasc, increase doses as needed.           Patient should seek medical attention immediately if condition should worsen, new symptoms develop , no further improvement or recurrence of presenting symptomatology, patient warned.     Downward trending of aminotransferase.....................Follow-up with gastroenterology as directed.     Consultants    GI         Surgeries/Procedures:     is required. Please contact your  to configure this SmartLink.                 Your medication list        ASK your doctor about these medications        Instructions Last Dose Given Next Dose Due   albuterol 90 mcg/actuation inhaler  Commonly known as: Proventil HFA      Inhale 2 puffs every 4 hours if needed for wheezing or shortness of breath.       amLODIPine 10 mg tablet  Commonly known as: Norvasc      Take 1 tablet (10 mg) by mouth once daily.       busPIRone 15 mg tablet  Commonly known as: Buspar      Take 1 tablet (15 mg) by mouth 2 times a day.       chlorthalidone 25 mg tablet  Commonly known as: Hygroton      Take 1 tablet (25 mg) by mouth once daily.       emtricitabine-tenofovir (TDF) 200-300 mg tablet  Commonly known as: Truvada      Take 1 tablet by mouth early in the morning..       fluticasone 50 mcg/actuation nasal spray  Commonly known as: Flonase      Administer 2 sprays into each nostril once daily.       losartan 100 mg tablet  Commonly known as: Cozaar      Take 1 tablet (100 mg) by mouth once daily.       methocarbamol 500 mg tablet  Commonly known as: Robaxin      Take 1 tablet (500 mg) by mouth 4 times a day as needed for muscle spasms.       promethazine-DM 6.25-15 mg/5 mL syrup  Commonly known as: Phenergan-DM      Take 5 mL by mouth every 4 hours if needed for cough.       sertraline 50 mg tablet  Commonly known as: Zoloft      Take 1/2 tab daily for first 8 days then take 1 tab daily                   Disposition/additional hospital course and events;    24-year-old with notable history of hypertension and sickle cell trait presenting with abdominal pain.  Patient presented with elevated aminotransferase levels.  He was seen in consultation by gastroenterology service, gastroenterology service was suspicious that patient disposition presented than hepatocellular pattern and likely drug-induced liver injury.  Right upper quadrant ultrasound was done and reflected no findings consistent with reported right upper quadrant pain.  The appears to be a temporal  relationship with Truvada and patient's presentation.  Notable findings of HSV and CMV IgG positive, reflecting likely prior exposure.  Downward trending of patient's aminotransferase was noted during hospital course. Elevated lipase levels also appreciated but there was clinical suspicion of acute pancreatitis.  PPI was added by gastroenterology service the patient did appear to have some improvement with such interventions.  He reports today that he felt that he is back to his baseline state of health was able to tolerate oral intake without difficulties.  And requested discharge home today.  Recommend gated with her GI service concerning discharge and patient reported GI service has made an appointment for him for close follow-up.  Will continue monitor patient off Truvada but recommend follow-up with family physician concerning medications stoppage .  Patient also reported history of cervical lymphadenopathy recommend follow-up from physician concerning monitoring.    Patient appeared hemodynamic stable and clinically fit for discharge.  Patient in agreement with discharge.  Patient requested discharge.    Patient should seek medical attention immediately if condition should worsen, new symptoms develop , no further improvement or recurrence of presenting symptomatology, patient warned.    Follow-up:    Follow -up with family physician within one week. Follow-up with gastroenterology recommended.      Discharge Time : 32 mins      Patient should seek medical attention immediately if condition should worsen , presenting symptoms/conditions do not resolve or new symptoms should developed,patient warned.     Dictation performed with assistance of voice recognition device therefore transcription errors are possible.

## 2025-02-04 NOTE — CARE PLAN
The patient's goals for the shift include keep safe    The clinical goals for the shift include Patient will have controlled pain throughout shift    Over the shift, the patient did not make progress toward the following goals. Barriers to progression include . Recommendations to address these barriers include   Problem: Pain - Adult  Goal: Verbalizes/displays adequate comfort level or baseline comfort level  Outcome: Progressing     Problem: Safety - Adult  Goal: Free from fall injury  Outcome: Progressing     Problem: Discharge Planning  Goal: Discharge to home or other facility with appropriate resources  Outcome: Progressing     Problem: Chronic Conditions and Co-morbidities  Goal: Patient's chronic conditions and co-morbidity symptoms are monitored and maintained or improved  Outcome: Progressing     Problem: Nutrition  Goal: Nutrient intake appropriate for maintaining nutritional needs  Outcome: Progressing   .

## 2025-02-04 NOTE — PROGRESS NOTES
Robert Molina [id number]   Service: Internal Medicine / Hospitalist Date of service: 2/4/2025                                  Full Code                     Subjective          24 y.o. with history of hypertension and sickle cell trait presents with abdominal pain.  Patient has had several recent encounters for this issue started having flulike upper respiratory symptoms in early January.  These went away with patient now with right upper quadrant abdominal pain worse with deep breaths.  Not worse after eating and patient has had regular diet since this pain came on.  Denies any nausea.  Has had what he describes as excessive sweating but no fevers or chills.  No sick contacts.  No recent travel.  Denies any history of IV drug use.  No new medications.  Had 2 ED encounters prior to this where he was found to have elevated LFTs.  Viral respiratory testing negative.  EBV and mono negative.  In the ED this admission, VSS.  .  .  Alk phos 151.  Bilirubin 0.6.  INR 1.2.  UDS with cannabis and opioids.  Abdominal ultrasound and CT abdomen pelvis negative         2/1:             Today the patient is found awake alert and interactive appropriately and appears NAD at the time of visit.  Describes that his abdominal pain is improved though still present.  Appears primarily only pleuritic and with palpation.  He was able to eat today though did feel perhaps his pain slightly increased following that.  No nausea or vomiting.  Plans on eating later in the day.  GI evaluated the patient and checking significant hepatitis/liver studies.  Transaminases did appear to improve over admission today.  Otherwise denies interval new symptoms or complaints including chest pain palpitations pleuritic type pain unusual shortness of breath cough sputum nausea vomiting diarrhea headache fever or chills.     2/2:             Today patient remains awake alert and interactive appropriately.  He describes that his  abdominal pain although somewhat improved yesterday is back to about the same as the day before.  Continues to take some diet but not much.  GI continues to follow with multiple liver studies pending.  Transaminases overall continue to trend down. Otherwise denies interval new symptoms or complaints including chest pain palpitations pleuritic type pain unusual shortness of breath cough sputum nausea vomiting diarrhea headache fever or chills.     2/3................No reported: Palpitations, headaches, chest pains, nausea, vomiting, fevers or chills.  Patient endorsed right upper quadrant pain.    2/4................No reported: Emesis, fevers, chills, chest pains, flank pain, dysuria, palpitations or dyspnea.  Patient reported feeling better today requesting discharge home.     Review of Systems:   Review of system otherwise negative if not aforementioned above in subjective.     Objective                    Physical Exam      Constitutional:       Appearance: Patient appeared in no acute cardiopulmonary distress.     Comments: Patient alert and oriented to person place time and situation.The patient appeared nontoxic.  HEENT:      Head: Normocephalic and atraumatic.Trachea midline      Nose:No observed congestion or rhinorrhea.     Mouth/Throat: Mucous membranes Moist, Trachea appeared  midline.  Eyes:      Extraocular Movements: Extraocular movements intact.      Pupils: Pupils are equal, round, and reactive to light.      Comments: No scleral icterus or conjunctival injection appreciated.   Cardiovascular:      Rate and Rhythm: Normal rate and regular rhythm. No clicks rubs or gallops, normal S1 and S2.No peripheral stigmata of endocarditis appreciated.     Pulmonary:      Lungs appeared clear to auscultation, no adventitious sound appreciated.  Abdominal:      General: Abdomen soft, obese, active bowel sounds, no involuntary guarding or rebound tenderness appreciated.     Comments: No overt tenderness  appreciated on examination at this time.No involuntary guarding or rebound tenderness.  Musculoskeletal:       Patient appeared to have full active range of motion for upper and lower extremities, no acute apparent joint deformity appreciated on examination.   No pitting edema or cyanosis appreciated.       Lymphadenopathy:      No appreciable palpable lymphadenopathy  Skin:     General: Skin is warm.      Coloration:  No jaundice     Findings: No abnormal appearing skin rashes or lesions that appeared acute noted on unclothed area of the skin..   Neurological:      General: No focal sensory or motor deficits appreciated, no meningeal signs or dysmetria noted.      Cranial Nerves: Cranial nerves II to XII appearing grossly intact.     Genitals:  Deferred  Psychiatric:         The patient appears to be displaying normal mood and affect at the time of evaluation.     Labs:           Lab Results   Component Value Date     GLUCOSE 78 02/02/2025     CALCIUM 8.8 02/02/2025      02/02/2025     K 3.8 02/02/2025     CO2 26 02/02/2025      02/02/2025     BUN 8 02/02/2025     CREATININE 0.73 02/02/2025            Lab Results   Component Value Date     WBC 7.6 02/02/2025     HGB 12.0 02/02/2025     HCT 36.9 02/02/2025     MCV 82 02/02/2025      02/02/2025      Lab Results   Component Value Date    GLUCOSE 82 02/04/2025    CALCIUM 8.8 02/04/2025     (L) 02/04/2025    K 3.9 02/04/2025    CO2 25 02/04/2025     02/04/2025    BUN 5 (L) 02/04/2025    CREATININE 0.73 02/04/2025      Lab Results   Component Value Date    WBC 7.3 02/04/2025    HGB 11.8 (L) 02/04/2025    HCT 35.9 (L) 02/04/2025    MCV 82 02/04/2025     02/04/2025      [unfilled]   [unfilled]   No results found for the last 90 days.                  X-rays/ Images     [unfilled]    right upper quadrant [[id number]] Collected: 02/03/25 1632   Order Status: Completed Updated: 02/03/25 1632   Narrative:     Interpreted By:   Jimena Munoz,  STUDY:  US RIGHT UPPER QUADRANT;  2/3/2025 4:02 pm      INDICATION:  Signs/Symptoms:worsening RUQ pain.          COMPARISON:  01/31/2025      ACCESSION NUMBER(S):  [id number]      ORDERING CLINICIAN:  NAEL EDGE      TECHNIQUE:  Multiple images of the right upper quadrant were obtained.      FINDINGS:  LIVER:  The liver measures 16.1 cm. Parenchymal echogenicity is coarse. No  focal mass is identified.          GALLBLADDER:  The gallbladder is nondistended, and demonstrates no evidence of  gallstones, wall thickening or surrounding fluid. The gallbladder  wall thickness is 2.6 mm. Sonographic Will's sign is negative.          BILE DUCTS:  No evidence of intra or extrahepatic biliary dilatation is  identified; the common bile duct measures 0.5 cm.      PANCREAS:  The pancreas is poorly visualized due to overlying bowel gas.      RIGHT KIDNEY:  The right kidney measures 13.0 cm in length. The renal cortical  echogenicity and thickness are within normal limit.  No  hydronephrosis or renal calculi are seen.       Impression:     No finding is identified to explain the patient's pain. Pancreas is  poorly visualized.      MACRO:  None      Signed by: Jimena Munoz 2/3/2025 4:31 PM  Dictation workstation:   [id number]   CT abdomen pelvis w IV contrast [[id number]] Collected: 01/31/25 2155   Order Status: Completed Updated: 01/31/25 2155   Narrative:     Interpreted By:  Gloria Sánchez,  STUDY:  CT ABDOMEN PELVIS W IV CONTRAST;  1/31/2025 9:21 pm      INDICATION:  Signs/Symptoms:Elevated LFTs, elevated lipase, abdominal pain.      COMPARISON:  None.      ACCESSION NUMBER(S):  [id number]      ORDERING CLINICIAN:  NATI HENRIQUEZ      TECHNIQUE:  Axial CT images of the abdomen and pelvis with coronal and sagittal  reconstructed images obtained after intravenous administration of 75  mL of Omnipaque 350      FINDINGS:  LOWER CHEST: No acute abnormality of the lung bases.      ABDOMEN:      LIVER:  Within normal limits.  BILE DUCTS: Normal caliber.  GALLBLADDER: No calcified gallstones. No wall thickening.  PANCREAS: Within normal limits.  SPLEEN: Within normal limits.  ADRENALS: Within normal limits.  KIDNEYS and URETERS: Symmetric renal enhancement. No hydronephrosis  or perinephric fluid collection.      VESSELS:   No aortic aneurysm.  RETROPERITONEUM: Nonenlarged periaortic lymph nodes noted.      PELVIS:      REPRODUCTIVE ORGANS: No pelvic masses.  BLADDER: Bladder is underdistended with apparent wall thickening.      BOWEL: No dilated bowel. Incidental note is made of colonic  interposition. Normal appendix. PERITONEUM: No ascites or free air,  no fluid collection.      ABDOMINAL WALL: Within normal limits.  BONES: No acute osseous abnormality.       Impression:     No acute abdominal or pelvic process.      No peripancreatic inflammatory changes or fluid is seen. Correlate  with laboratory examinationIf there is concern for early acute  pancreatitis.      Mild bladder wall thickening may relate to under distention.  Correlate with symptomatology and urinalysis if there is clinical  concern for cystitis.      Additional findings as described above.      MACRO:  None      Signed by: Gloria Sánchez 1/31/2025 9:53 PM  Dictation workstation:   [id number]    gallbladder [[id number]] Collected: 01/31/25 2014   Order Status: Completed Updated: 01/31/25 2032   Narrative:     STUDY:  Right Upper Quadrant Ultrasound; 1/31/2025 8:08 PM  INDICATION:  Epigastric pain.  Nausea.  Elevated LFT.  COMPARISON:  None Available.  ACCESSION NUMBER(S):  [id number]  ORDERING CLINICIAN:  NATI HENRIQUEZ  TECHNIQUE:  Ultrasound of the Right Upper Quadrant.  FINDINGS:  LIVER:  The liver demonstrates normal echogenicity.       GALLBLADDER:  The gallbladder is anechoic.  There are no stones.  There is no  pericholecystic fluid or wall thickening.  Sonographic Will's sign  is negative.       BILE DUCTS:  The common bile duct  measures 0.4 cm.  There is no intrahepatic  biliary dilatation.       PANCREAS:  The pancreas is not well seen due to overlying bowel gas.     RIGHT KIDNEY:  The right kidney measures 11.4 cm in length.  Renal cortical  echotexture is normal.  There is no hydronephrosis.  There are no  stones.  There are no cysts.   Impression:     Unremarkable ultrasound of the right upper quadrant.  No  cholelithiasis, gallbladder wall thickening, or biliary dilatation is  appreciated.  Signed by Andrew Blakely,    XR chest 2 views [[id number]] Collected: 01/31/25 2026   Order Status: Completed Updated: 01/31/25 2026   Narrative:     Interpreted By:  Domingo France,  STUDY:  XR CHEST 2 VIEWS;  1/31/2025 7:43 pm      INDICATION:  Signs/Symptoms:Chest pain.          COMPARISON:  Radiographs of the chest dated 06/23/2024.      ACCESSION NUMBER(S):  [id number]      ORDERING CLINICIAN:  NATI HENRIQUEZ      FINDINGS:  PA and lateral radiographs of the chest were provided.              CARDIOMEDIASTINAL SILHOUETTE:  Cardiomediastinal silhouette is normal in size and configuration.      LUNGS:  Lungs are clear, without evidence of consolidation or pleural  effusion.      ABDOMEN:  No remarkable upper abdominal findings.      BONES:  No acute osseous changes.       Impression:     1.  No evidence of acute cardiopulmonary process.              MACRO:  None      Signed by: Domingo France 1/31/2025 8:25 PM  Dictation workstation:   [id number]                  Medical Problems         Problem List         * (Principal) Elevated LFTs     Benign essential hypertension     Chronic nasal congestion     Migraines     Seasonal allergies     Sickle cell trait (CMS-HCC)     Encounter for pre-exposure prophylaxis for HIV     At risk for HIV due to homosexual contact     Generalized anxiety disorder     B12 deficiency     Morbid obesity with body mass index (BMI) of 40.0 to 44.9 in adult (Multi)     Sleep apnea     Acute upper  respiratory infection     Bell's palsy     Myalgia     Transaminitis     Lymph node enlargement                  Above medical problems may be reflective of historical medical problems that may have resolved and may not related to acute clinical condition/medical problems.                 Clinical impression/plan:     Elevated LFTs  -Has had prolonged flulike sx (had URI sx but resolved, still has sweats, now with RUQ abd pain)  -Viral testing including COVID, RSV, COVID, EBV negative  -No history of IV drug use, imaging negative for biliary pathology  -Patient is on Truvada which can elevate LFTs but wouldn't expect abd pain, will hold this  -Cause unclear at this point, suspect prolonged viral illness  -Follow-up viral hepatitis panel  -NPO for GI consultation  2/1: GI evaluated with uncertain etiology for his presentation but checking extensive liver studies.  Pain reported as improved though still present and has been able to eat today.    2/2: GI awaiting multiple liver studies pending.  Transaminases appear to be generally trending down.  Pain however not overall improving at this point.  Continue to monitor.           Abdominal pain     - Continue analgesics as needed, no constipation reported, nonacute abdomen appreciated on examination of.           Essentially Hypertension  -Home medications        Disposition/additional care plan/interventions: 2/3/2025     Continue symptom control for abdominal pain.............. nonacute abdomen appreciated on examination.     Possible drug-induced liver injury.............. continue current workup and recommendations as per GI.     Recommend PPI     Repeat lipase level............. while lipase could be representative of pancreatitis symptomatology reported by patient and physical examination does not appear to match.  Have to be mindful of other GI sources/reasons for elevated lipase level.     Gentle fluid hydration     Monitor transaminitis     Repeat lipase      Patient reported no history of HIV reports that he used Truvada for Prep.  Therefore lower suspicion of this CMV, Kaposi's sarcoma, lymphoma etc.     , Peptic ulcer disease recommended starting PPI.     Bladder scan     Monitor blood pressure closely.     Given transaminitis hepatic dosing of Cozaar and Norvasc.  Advance dosing slowly.     Care plan discussed in detail with patient and patient's mother at bedside.     Disposition/additional care plan/interventions: 2/4/2025    Clinical suspicion of drug induced liver injury.  This is somewhat of a challenging diagnosis which relies on exclusion of other causes.  Continue workup as per gastroenterology.  Appears to be a temporal relation between drug exposure/Truvada and liver injury.    Continue supportive care        Improved blood pressure with hepatic dosing of Cozaar and Norvasc, increase doses as needed.        Patient should seek medical attention immediately if condition should worsen, new symptoms develop , no further improvement or recurrence of presenting symptomatology, patient warned.    Downward trending of aminotransferase.....................Follow-up with gastroenterology as directed.                Dictation performed with assistance of voice recognition device therefore transcription errors are possible.

## 2025-02-04 NOTE — NURSING NOTE
Discharge instructions reviewed with patient. Medication list reviewed with patient including new prescriptions. Patient understands how to followup appropriately. All questions and concerns answered at this time.  IV removed from patient.   Patient safely discharged, patient refused wheelchair, ambulated safely with all belongings off unit.

## 2025-02-04 NOTE — PROGRESS NOTES
"  [facility] Gastroenterology Progress Note    ASSESSMENT and PLAN:     Dena Rivero \"Lad\" is a 24 y.o. adult with a significant past medical history of sickle cell trait, [place], obesity (BMI 42), anxiety, migraines, and HTN who presented with after being told that LFTs were elevated. GI was consulted for \"elevated LFTs\".         Elevated LFTs, abdominal pain   LFTs elevated in a primarily hepatocellular pattern. Previously LFTs have been normal, but most recent ones prior to this episode were in December 2023. Suspect that current elevation is secondary to a viral process or medication induced. Two courses of Azithromycin were given prior to LFT changes. That can be a cause of LFT elevation and while it is most commonly a cholestatic pattern it can be hepatocellular as well. No vidence of any obstructive process on imaging. Lipase is elevated, but imaging shows no evidence of pancreatitis and symptoms are less consistent with pancreatitis. Acute hepatitis panel was negative and LFTs continue to trend down overall. Work up for other causes of liver injury has been mostly negative, but does show a positive CMV IgG and positive HSV that likely reflect prior exposure. Abdominal pain has resolved today and patient feels ready to go home.   - monitor LFTs and INR daily while admitted   - follow up outpatient; our office will arrange       Case discussed with Dr. Silvia Giles CNP        SUBJECTIVE and INTERVAL HISTORY:       Dena Rivero \"Lad\" is a 24 y.o. adult with a significant past medical history of sickle cell trait, [place], obesity (BMI 42), anxiety, migraines, and HTN who presented with after being told that LFTs were elevated. GI was consulted for \"elevated LFTs\".       HPI:  Patient reports improvement of abdominal pain today compared to yesterday. Also still having right shoulder pain. No N/V, melena, hematochezia.         Review of systems:     I performed a complete 10 point review of " "systems and it is negative except as noted in HPI or above.    All other systems have been reviewed and are negative.          OBJECTIVE:       Last Recorded Vitals:  Vitals:    02/03/25 2048 02/04/25 0152 02/04/25 0608 02/04/25 0900   BP: 133/79 123/68 120/68 121/80   BP Location: Right arm Right arm Right arm Right arm   Patient Position: Lying Lying Lying Lying   Pulse: 93 88 90 89   Resp: 15 15 16 16   Temp: 36.7 °C (98 °F) 36.7 °C (98.1 °F) 36.8 °C (98.2 °F) 36.7 °C (98.1 °F)   TempSrc: Temporal Temporal Temporal Temporal   SpO2: 99% 99% 97% 96%   Weight:       Height:         /80 (BP Location: Right arm, Patient Position: Lying)   Pulse 89   Temp 36.7 °C (98.1 °F) (Temporal)   Resp 16   Ht 1.956 m (6' 5\")   Wt (!) 163 kg (359 lb 5.6 oz)   SpO2 96%   BMI 42.61 kg/m²      Physical Exam:    Physical Exam  Constitutional:       General: Lad is not in acute distress.     Appearance: Normal appearance.   HENT:      Mouth/Throat:      Mouth: Mucous membranes are moist.      Comments: pink  Eyes:      Conjunctiva/sclera: Conjunctivae normal.      Pupils: Pupils are equal, round, and reactive to light.   Cardiovascular:      Rate and Rhythm: Normal rate and regular rhythm.      Heart sounds: No murmur heard.  Pulmonary:      Effort: Pulmonary effort is normal.      Breath sounds: Normal breath sounds.   Abdominal:      General: Bowel sounds are normal. There is no distension.      Palpations: Abdomen is soft.      Tenderness: There is abdominal tenderness (RUQ). There is no guarding.   Skin:     General: Skin is warm and dry.      Coloration: Skin is not jaundiced.   Neurological:      Mental Status: Lad is alert and oriented to person, place, and time.   Psychiatric:         Mood and Affect: Mood normal.         Behavior: Behavior normal.           Inpatient Medications:  amLODIPine, 2.5 mg, oral, Daily  losartan, 25 mg, oral, Daily  pantoprazole, 40 mg, oral, Daily before breakfast      PRN medications: " acetaminophen **OR** acetaminophen **OR** acetaminophen, alum-mag hydroxide-simeth, benzocaine-menthol, dextromethorphan-guaifenesin, diphenhydrAMINE, guaiFENesin, HYDROmorphone, HYDROmorphone, melatonin, menthol, ondansetron **OR** ondansetron, polyethylene glycol    Outpatient Medications:  Prior to Admission medications    Medication Sig Start Date End Date Taking? Authorizing Provider   albuterol (Proventil HFA) 90 mcg/actuation inhaler Inhale 2 puffs every 4 hours if needed for wheezing or shortness of breath. 1/8/25 1/8/26 Yes June Justin PA-C   amLODIPine (Norvasc) 10 mg tablet Take 1 tablet (10 mg) by mouth once daily. 12/2/24 12/2/25 Yes Katerine Osborne MD   busPIRone (Buspar) 15 mg tablet Take 1 tablet (15 mg) by mouth 2 times a day. 12/2/24 12/2/25 Yes Katerine Osborne MD   chlorthalidone (Hygroton) 25 mg tablet Take 1 tablet (25 mg) by mouth once daily. 12/2/24 12/2/25 Yes Katerine Osborne MD   emtricitabine-tenofovir, TDF, (Truvada) 200-300 mg tablet Take 1 tablet by mouth early in the morning.. 12/13/24  Yes Katerine Osborne MD   fluticasone (Flonase) 50 mcg/actuation nasal spray Administer 2 sprays into each nostril once daily. 6/24/24  Yes DEBORAH Tijerina   losartan (Cozaar) 100 mg tablet Take 1 tablet (100 mg) by mouth once daily. 12/2/24 12/2/25 Yes Katerine Osborne MD   methocarbamol (Robaxin) 500 mg tablet Take 1 tablet (500 mg) by mouth 4 times a day as needed for muscle spasms. 1/30/25 3/31/25 Yes DEBORAH Tijerina   promethazine-DM (Phenergan-DM) 6.25-15 mg/5 mL syrup Take 5 mL by mouth every 4 hours if needed for cough. 1/24/25 2/23/25 Yes Katerine Osborne MD   sertraline (Zoloft) 50 mg tablet Take 1/2 tab daily for first 8 days then take 1 tab daily  Patient taking differently: Take 1 tablet (50 mg) by mouth once daily. 12/2/24   Katerine Osborne MD   azithromycin (Zithromax Z-Cristian) 250 mg tablet Take as directed.  Patient not taking: Reported  "on 1/30/2025 1/24/25 2/1/25  Katerine Osborne MD   benzonatate (Tessalon) 100 mg capsule Take 1 capsule (100 mg) by mouth 3 times a day as needed for cough. Do not crush or chew.  Patient not taking: Reported on 1/30/2025 1/8/25 2/1/25  June Justin PA-C   cyanocobalamin (Vitamin B-12) 1,000 mcg tablet Take 1 tablet (1,000 mcg) by mouth once daily.  Patient not taking: Reported on 1/30/2025 2/1/25  Historical Provider, MD   triamcinolone (Kenalog) 0.1 % cream Apply topically 2 times a day. Apply to affected area 1-2 times daily as needed. Avoid face and groin.  Patient not taking: Reported on 2/1/2025 1/24/25 2/1/25  Katerine Osborne MD       LABS AND IMAGING:     Labs:  Recent labs reviewed in the EMR.    Lab Results   Component Value Date    WBC 7.3 02/04/2025    HGB 11.8 (L) 02/04/2025    HGB 12.2 02/03/2025    HGB 12.0 02/02/2025    MCV 82 02/04/2025     02/04/2025     02/03/2025     02/02/2025       Lab Results   Component Value Date     (L) 02/04/2025    K 3.9 02/04/2025     02/04/2025    BUN 5 (L) 02/04/2025    CREATININE 0.73 02/04/2025    CREATININE 0.73 02/03/2025       Lab Results   Component Value Date    BILITOT 1.0 02/04/2025    BILITOT 0.8 02/03/2025    BILITOT 0.8 02/02/2025    ALKPHOS 161 (H) 02/04/2025    ALKPHOS 170 (H) 02/03/2025    ALKPHOS 153 (H) 02/02/2025    AST 91 (H) 02/04/2025     (H) 02/03/2025     (H) 02/02/2025     (H) 02/04/2025     (H) 02/03/2025     (H) 02/02/2025    LIPASE 112 (H) 02/03/2025       No results found for: \"CRP\", \"CALPS\"      Imaging:  Recent imaging results reviewed.       "

## 2025-02-05 ENCOUNTER — PATIENT OUTREACH (OUTPATIENT)
Dept: PRIMARY CARE | Facility: CLINIC | Age: 25
End: 2025-02-05
Payer: COMMERCIAL

## 2025-02-05 LAB
A1AT PHENOTYP SERPL-IMP: NORMAL
A1AT SERPL-MCNC: 193 MG/DL (ref 90–200)

## 2025-02-05 NOTE — PROGRESS NOTES
Discharge Facility:[facility]  Discharge Diagnosis:Transaminitis  ABD Pain HTN  Admission Date:2/1/25  Discharge Date: 2/4/25    PCP Appointment Date:2/6/25  Specialist Appointment Date:   Hospital Encounter and Summary Linked: YesED to Hosp-Admission (Discharged) with Elias Luna DO; Luly Lion MD (01/31/2025)   See discharge assessment below for further details  Wrap Up  Wrap Up Additional Comments: discused discharge patient stated that hes improving from ABD Pain but having issues with muscle discomfort. patient stated that his lymph nodes still feel swollen at the base of the neck.He stated that he will discuss this at his upcoming appt. provided contact information encouraged call with questions. (2/5/2025 10:45 AM)  Call End Time: 1050 (2/5/2025 10:45 AM)    Engagement  Call Start Time: 1046 (2/5/2025 10:45 AM)    Medications  Medications reviewed with patient/caregiver?: Yes (2/5/2025 10:45 AM)  Is the patient having any side effects they believe may be caused by any medication additions or changes?: No (2/5/2025 10:45 AM)  Does the patient have all medications ordered at discharge?: Not applicable (2/5/2025 10:45 AM)    Appointments  Does the patient have a primary care provider?: Yes (2/5/2025 10:45 AM)  Care Management Interventions: Verified appointment date/time/provider (2/5/2025 10:45 AM)  Has the patient kept scheduled appointments due by today?: Yes (2/5/2025 10:45 AM)    Self Management  What is the home health agency?: N/A (2/5/2025 10:45 AM)  Has home health visited the patient within 72 hours of discharge?: Not applicable (2/5/2025 10:45 AM)  What Durable Medical Equipment (DME) was ordered?: N/A (2/5/2025 10:45 AM)  Has all Durable Medical Equipment (DME) been delivered?: No (2/5/2025 10:45 AM)    Patient Teaching  Does the patient have access to their discharge instructions?: Yes (2/5/2025 10:45 AM)  Care Management Interventions: Reviewed instructions with patient (2/5/2025  10:45 AM)  What is the patient's perception of their health status since discharge?: Improving (2/5/2025 10:45 AM)  Is the patient/caregiver able to teach back the hierarchy of who to call/visit for symptoms/problems? PCP, Specialist, Home Health nurse, Urgent Care, ED, 911: Yes (2/5/2025 10:45 AM)

## 2025-02-06 ENCOUNTER — APPOINTMENT (OUTPATIENT)
Dept: PRIMARY CARE | Facility: CLINIC | Age: 25
End: 2025-02-06
Payer: COMMERCIAL

## 2025-02-06 VITALS
TEMPERATURE: 97.8 F | HEART RATE: 107 BPM | DIASTOLIC BLOOD PRESSURE: 87 MMHG | OXYGEN SATURATION: 99 % | BODY MASS INDEX: 37.19 KG/M2 | RESPIRATION RATE: 18 BRPM | HEIGHT: 77 IN | SYSTOLIC BLOOD PRESSURE: 138 MMHG | WEIGHT: 315 LBS

## 2025-02-06 DIAGNOSIS — R74.01 TRANSAMINITIS: Primary | ICD-10-CM

## 2025-02-06 DIAGNOSIS — B25.9 CYTOMEGALOVIRUS INFECTION, UNSPECIFIED CYTOMEGALOVIRAL INFECTION TYPE (MULTI): ICD-10-CM

## 2025-02-06 DIAGNOSIS — M79.18 MUSCLE ACHE OF EXTREMITY: ICD-10-CM

## 2025-02-06 PROCEDURE — 99496 TRANSJ CARE MGMT HIGH F2F 7D: CPT | Performed by: NURSE PRACTITIONER

## 2025-02-06 PROCEDURE — 96372 THER/PROPH/DIAG INJ SC/IM: CPT | Performed by: NURSE PRACTITIONER

## 2025-02-06 PROCEDURE — 1036F TOBACCO NON-USER: CPT | Performed by: NURSE PRACTITIONER

## 2025-02-06 PROCEDURE — 3075F SYST BP GE 130 - 139MM HG: CPT | Performed by: NURSE PRACTITIONER

## 2025-02-06 PROCEDURE — 3079F DIAST BP 80-89 MM HG: CPT | Performed by: NURSE PRACTITIONER

## 2025-02-06 PROCEDURE — 3008F BODY MASS INDEX DOCD: CPT | Performed by: NURSE PRACTITIONER

## 2025-02-06 RX ORDER — KETOROLAC TROMETHAMINE 30 MG/ML
30 INJECTION, SOLUTION INTRAMUSCULAR; INTRAVENOUS ONCE
Status: COMPLETED | OUTPATIENT
Start: 2025-02-06 | End: 2025-02-06

## 2025-02-06 RX ORDER — KETOROLAC TROMETHAMINE 15 MG/ML
30 INJECTION, SOLUTION INTRAMUSCULAR; INTRAVENOUS ONCE AS NEEDED
Qty: 1 ML | Refills: 0 | Status: SHIPPED | OUTPATIENT
Start: 2025-02-06 | End: 2025-02-06 | Stop reason: ALTCHOICE

## 2025-02-06 RX ADMIN — KETOROLAC TROMETHAMINE 30 MG: 30 INJECTION, SOLUTION INTRAMUSCULAR; INTRAVENOUS at 16:45

## 2025-02-06 ASSESSMENT — ENCOUNTER SYMPTOMS
NAUSEA: 0
SHORTNESS OF BREATH: 0
FATIGUE: 0
CONSTITUTIONAL NEGATIVE: 1
ACTIVITY CHANGE: 0
APNEA: 0
VOMITING: 0
WEAKNESS: 0
FEVER: 0
CHILLS: 0
HEADACHES: 0
ABDOMINAL PAIN: 0
PALPITATIONS: 0
COUGH: 0
RESPIRATORY NEGATIVE: 1
NERVOUS/ANXIOUS: 0
DIZZINESS: 0
CONFUSION: 0

## 2025-02-06 NOTE — PROGRESS NOTES
"Subjective   Patient ID: Robert Rivero is a 24 y.o. adult who presents for Hospital Follow-up, Transaminitis, +CMV IgM , and Muscle aches.    Hospital follow-up    Transaminitis, drug-induced liver injury with abdominal pain: Patient has presented several times to ED for evaluation.  Admitted on 2024 however and discharged 2025.  Patient reported flulike upper respiratory symptoms in early January, shortly after developed elevated LFTs.  Viral respiratory testing negative, EBV and mono negative.  Patient with initial  and .  Has trended down upon discharge on  2  and AST 91.  Patient is feeling much better with improved abdominal pain.  Patient also has right upper extremity rash-treating with hydrocortisone  Positive cytomegalovirus IgG and IgM with elevated IgM greater than 150.  Patient's abdominal pain is improving but does report the muscle aches are still 10 out of 10.  No improvement on Robaxin      Patient: Dena Rivero \"Lad\"  : 2000  PCP: TANA Tijerina-ANDIE  MRN: [id number]  Program: Transitional Care Management  Status: Enrolled  Effective Dates: 2025 - present  Responsible Staff: Hortencia Gonzalez LPN  Social Drivers to be Addressed: Depression, Physical Activity, Social Connections, Stress                 Review of Systems   Constitutional: Negative.  Negative for activity change, chills, fatigue and fever.   Respiratory: Negative.  Negative for apnea, cough and shortness of breath.    Cardiovascular:  Negative for chest pain and palpitations.   Gastrointestinal:  Negative for abdominal pain, nausea and vomiting.   Neurological:  Negative for dizziness, weakness and headaches.   Psychiatric/Behavioral:  Negative for confusion. The patient is not nervous/anxious.        Objective   /87   Pulse 107   Temp 36.6 °C (97.8 °F) (Temporal)   Resp 18   Ht 1.956 m (6' 5\")   Wt (!) 166 kg (365 lb)   SpO2 99%   BMI 43.28 kg/m²     Physical " Exam  Constitutional:       Appearance: Normal appearance.   HENT:      Head: Normocephalic.   Cardiovascular:      Rate and Rhythm: Normal rate and regular rhythm.      Pulses: Normal pulses.      Heart sounds: Normal heart sounds.   Pulmonary:      Effort: Pulmonary effort is normal. No respiratory distress.      Breath sounds: Normal breath sounds. No wheezing.   Abdominal:      General: Bowel sounds are normal.   Neurological:      General: No focal deficit present.      Mental Status: Lad is alert and oriented to person, place, and time.   Psychiatric:         Mood and Affect: Mood normal.         Behavior: Behavior normal.         Assessment/Plan   Problem List Items Addressed This Visit             ICD-10-CM    Transaminitis - Primary R74.01     Infectious disease referral for cytomegalovirus evaluation  Negative hepatitis panel  LFTs trending downward  Continues to hold Truvada  On half dose amlodipine and losartan         Relevant Orders    Hepatic function panel    Muscle ache of extremity M79.18     Toradol 30 mg IM injection in office  Continue Robaxin         Relevant Medications    ketorolac (Toradol) injection 30 mg (Completed)    Cytomegalovirus infection (Multi) B25.9     Referral to infectious disease         Relevant Orders    Referral to Infectious Disease

## 2025-02-06 NOTE — ASSESSMENT & PLAN NOTE
Infectious disease referral for cytomegalovirus evaluation  Negative hepatitis panel  LFTs trending downward  Continues to hold Truvada  On half dose amlodipine and losartan

## 2025-02-14 ENCOUNTER — PATIENT OUTREACH (OUTPATIENT)
Dept: PRIMARY CARE | Facility: CLINIC | Age: 25
End: 2025-02-14
Payer: COMMERCIAL

## 2025-02-17 NOTE — DOCUMENTATION CLARIFICATION NOTE
"    PATIENT:               JEAN QUINN  ACCT #:                  [id number]  MRN:                       [id number]  :                       2000  ADMIT DATE:       2025 7:18 PM  DISCH DATE:        2025 4:20 PM  RESPONDING PROVIDER #:        [id number]          PROVIDER RESPONSE TEXT:    Pancreatitis diagnosis is not present this admission    CDI QUERY TEXT:    Clarification        Instruction:    Based on your assessment of the patient and the clinical information, please provide the requested documentation by clicking on the appropriate radio button and enter any additional information if prompted.    Question: Based on your medical judgment, can you please clarify which of these conditions is the most clinically supported    When answering this query, please exercise your independent professional judgment. The fact that a question is being asked, does not imply that any particular answer is desired or expected.    The patient's clinical indicators include:  Clinical Information: There is conflicting documentation in the medical record which requires clarification.    25 Progress note: ?lipase is elevated, but imaging shows no evidence of pancreatitis and symptoms are less consistent with pancreatitis.?      25 D/C Summary: \"Downward trending of patient's aminotransferase was noted during hospital course. Elevated lipase levels also appreciated but there was clinical suspicion of acute pancreatitis?      Clinical Indicators:  Labs:  25 Lipase:  188  2/3/25  Lipase:  112    Treatment:  CT of ABD  US of Gallbladder  US RUQ  Tylenol  Morphine  Repeat labs    Risk Factors:  Elevated LFTs  Options provided:  -- Pancreatitis diagnosis POA, present this admission  -- Pancreatitis diagnosis is not present this admission  -- Other - I will add my own diagnosis  -- Refer to Clinical Documentation Reviewer    Query created by: Susan Awan on 2/10/2025 10:44 AM      Electronically signed " by:  BERNARD PRATT DO 2/17/2025 11:42 AM

## 2025-02-25 ENCOUNTER — APPOINTMENT (OUTPATIENT)
Facility: CLINIC | Age: 25
End: 2025-02-25
Payer: COMMERCIAL

## 2025-02-25 VITALS
SYSTOLIC BLOOD PRESSURE: 142 MMHG | DIASTOLIC BLOOD PRESSURE: 90 MMHG | HEIGHT: 77 IN | WEIGHT: 315 LBS | BODY MASS INDEX: 37.19 KG/M2 | HEART RATE: 103 BPM | OXYGEN SATURATION: 97 %

## 2025-02-25 DIAGNOSIS — R79.89 ELEVATED LFTS: Primary | ICD-10-CM

## 2025-02-25 DIAGNOSIS — B25.9 CYTOMEGALOVIRUS INFECTION, UNSPECIFIED CYTOMEGALOVIRAL INFECTION TYPE (MULTI): ICD-10-CM

## 2025-02-25 LAB
ATRIAL RATE: 99 BPM
P AXIS: 44 DEGREES
PR INTERVAL: 141 MS
Q ONSET: 251 MS
QRS COUNT: 16 BEATS
QRS DURATION: 83 MS
QT INTERVAL: 334 MS
QTC CALCULATION(BAZETT): 427 MS
QTC FREDERICIA: 393 MS
R AXIS: 39 DEGREES
T AXIS: 25 DEGREES
T OFFSET: 418 MS
VENTRICULAR RATE: 98 BPM

## 2025-02-25 PROCEDURE — 3008F BODY MASS INDEX DOCD: CPT | Performed by: NURSE PRACTITIONER

## 2025-02-25 PROCEDURE — 99214 OFFICE O/P EST MOD 30 MIN: CPT | Performed by: NURSE PRACTITIONER

## 2025-02-25 PROCEDURE — 3077F SYST BP >= 140 MM HG: CPT | Performed by: NURSE PRACTITIONER

## 2025-02-25 PROCEDURE — 3080F DIAST BP >= 90 MM HG: CPT | Performed by: NURSE PRACTITIONER

## 2025-02-25 ASSESSMENT — ENCOUNTER SYMPTOMS
ROS GI COMMENTS: SEE HPI
SHORTNESS OF BREATH: 0
ADENOPATHY: 1
DIZZINESS: 0
JOINT SWELLING: 0
CHILLS: 0
FEVER: 0
COUGH: 0
ARTHRALGIAS: 1
PALPITATIONS: 0
BRUISES/BLEEDS EASILY: 0
SORE THROAT: 0
FATIGUE: 1
TROUBLE SWALLOWING: 0
WEAKNESS: 1
CONFUSION: 0
MYALGIAS: 1
DIFFICULTY URINATING: 0
WOUND: 0

## 2025-02-25 NOTE — PATIENT INSTRUCTIONS
Thank you for coming to your appointment today   - make an appointment with infectious disease and let me know when it is   - I will keep an eye out for your labs     Please call [phone] with any questions or concerns       If you utilize mycirQle messages, please understand that these are intended to be used for simple and straightforward medical questions. If you have a more complex question or numerous complaints, an office appointment may be needed.

## 2025-02-25 NOTE — PROGRESS NOTES
"Subjective   Patient ID: Dena Rivero \"Robert\" is a 24 y.o. adult with PMH of HTN, anemia, sickel cell trait who presents for Hospital Follow-up (Abdominal pain, joint pain/weakness./Multiple tests completed while in hospital.).     Patient's PCP is DEBORAH Tijerina     [facility]  Hospital follow up     Recently hospitalized 1/31/25 - 2/4/25 for abdominal pain and elevated LFTs. GI was consulted for the elevated LFTs felt likely to be related to recent antibiotics. Additional lab work did show a positive CMV IgG and positive HSV that were felt to reflect prior exposures, however, CMV IgM also returned elevated >150 and they were referred to ID for CMV evaluation by his PCP.  They have not yet made an appointment.     Patient continues to have RUQ discomfort but it is improved from prior. Reports they got blood work this morning that has not yet resulted (LFT recheck). Still has a prominent lymph node in the occipital region, fatigue, muscle weakness, and joint pain. No unintended weight loss, N/V, melena, change in bowel habits, hematochezia, or jaundice.       Summary of endoscopies:      Social Hx:  Tobacco: none  Etoh: none  Recreational drug use: marijuana   NSAIDs: none      Family Hx:  No GI malignancy, IBD, or pancreatitis     Review of Systems:  Review of Systems   Constitutional:  Positive for fatigue. Negative for chills and fever.   HENT:  Negative for sore throat and trouble swallowing.    Respiratory:  Negative for cough and shortness of breath.    Cardiovascular:  Negative for chest pain and palpitations.   Gastrointestinal:         SEE HPI   Endocrine: Negative for cold intolerance and heat intolerance.   Genitourinary:  Negative for difficulty urinating.   Musculoskeletal:  Positive for arthralgias and myalgias. Negative for joint swelling.   Skin:  Negative for rash and wound.   Neurological:  Positive for weakness. Negative for dizziness.   Hematological:  Positive for adenopathy. Does not " bruise/bleed easily.   Psychiatric/Behavioral:  Negative for confusion.         Medications:  Prior to Admission medications    Medication Sig Start Date End Date Taking? Authorizing Provider   albuterol (Proventil HFA) 90 mcg/actuation inhaler Inhale 2 puffs every 4 hours if needed for wheezing or shortness of breath. 1/8/25 1/8/26 Yes June Justin PA-C   amLODIPine (Norvasc) 5 mg tablet Take 1 tablet (5 mg) by mouth once daily. Do not fill before February 5, 2025.  Patient taking differently: Take 0.5 tablets (2.5 mg) by mouth once daily. 2/5/25  Yes Elias Luna, DO   busPIRone (Buspar) 15 mg tablet Take 1 tablet (15 mg) by mouth 2 times a day. 12/2/24 12/2/25 Yes Katerine Osborne MD   chlorthalidone (Hygroton) 25 mg tablet Take 1 tablet (25 mg) by mouth once daily. 12/2/24 12/2/25 Yes Katerine Osborne MD   fluticasone (Flonase) 50 mcg/actuation nasal spray Administer 2 sprays into each nostril once daily. 6/24/24  Yes DEBORAH Tijerina   losartan (Cozaar) 25 mg tablet Take 1 tablet (25 mg) by mouth once daily. 2/5/25 3/7/25 Yes Elias Luna DO   methocarbamol (Robaxin) 500 mg tablet Take 1 tablet (500 mg) by mouth 4 times a day as needed for muscle spasms. 1/30/25 3/31/25 Yes DEBORAH Tijerina   promethazine-DM (Phenergan-DM) 6.25-15 mg/5 mL syrup Take 5 mL by mouth every 4 hours if needed for cough. 1/24/25 2/25/25 Yes Katerine Osborne MD   sertraline (Zoloft) 50 mg tablet Take 1/2 tab daily for first 8 days then take 1 tab daily  Patient taking differently: Take 1 tablet (50 mg) by mouth once daily. Take 1/2 tab daily for first 8 days then take 1 tab daily 12/2/24  Yes Katerine Osborne MD   losartan (Cozaar) 100 mg tablet Take 1 tablet (100 mg) by mouth once daily.  Patient not taking: Reported on 2/25/2025 12/2/24 12/2/25  Katerine Osborne MD   pantoprazole (ProtoNix) 40 mg EC tablet Take 1 tablet (40 mg) by mouth once daily in the morning. Take before meals. Do  not crush, chew, or split.  Patient not taking: Reported on 2/25/2025 2/5/25 3/7/25  Elias Luna DO       Allergies:  Patient has no known allergies.    Past Medical History:  Lad has a past medical history of Allergic, Anemia, Headache, Hypertension, Mild anemia (04/14/2023), Personal history of diseases of the blood and blood-forming organs and certain disorders involving the immune mechanism, Personal history of diseases of the skin and subcutaneous tissue (03/07/2017), Personal history of other diseases of urinary system (08/22/2016), and Tonsillitis (12/07/2023).    Past Surgical History:  Lad has no past surgical history on file.    Social History:  Lad reports that Lad has never smoked. Lad has never used smokeless tobacco. Lad reports that Lad does not currently use alcohol. Lad reports current drug use. Drug: Marijuana.    Objective   Physical exam:  Physical Exam  Constitutional:       General: Lad is not in acute distress.     Appearance: Normal appearance.   HENT:      Mouth/Throat:      Mouth: Mucous membranes are moist.      Comments: pink  Eyes:      Conjunctiva/sclera: Conjunctivae normal.      Pupils: Pupils are equal, round, and reactive to light.   Neck:      Comments: Occipital lymph node enlarged   Cardiovascular:      Rate and Rhythm: Normal rate and regular rhythm.      Heart sounds: No murmur heard.  Pulmonary:      Effort: Pulmonary effort is normal.      Breath sounds: Normal breath sounds.   Abdominal:      General: Bowel sounds are normal. There is no distension.      Palpations: Abdomen is soft.      Tenderness: There is no abdominal tenderness. There is no guarding.   Skin:     General: Skin is warm and dry.      Coloration: Skin is not jaundiced.   Neurological:      Mental Status: Lad is alert and oriented to person, place, and time.   Psychiatric:         Mood and Affect: Mood normal.         Behavior: Behavior normal.          Assessment/Plan     Elevated LFTs in setting of  CMV  Patient to schedule with ID. LFTs improving. RUQ discomfort is also improving.            Kim Giles, APRN-CNP

## 2025-02-26 LAB
ALBUMIN SERPL-MCNC: 3.9 G/DL (ref 3.6–5.1)
ALBUMIN/GLOB SERPL: 0.8 (CALC) (ref 1–2.5)
ALP SERPL-CCNC: 143 U/L (ref 36–130)
ALT SERPL-CCNC: 61 U/L (ref 9–46)
AST SERPL-CCNC: 43 U/L (ref 10–40)
BILIRUB DIRECT SERPL-MCNC: 0.1 MG/DL
BILIRUB INDIRECT SERPL-MCNC: 0.4 MG/DL (CALC) (ref 0.2–1.2)
BILIRUB SERPL-MCNC: 0.5 MG/DL (ref 0.2–1.2)
GLOBULIN SER CALC-MCNC: 5.1 G/DL (CALC) (ref 1.9–3.7)
PROT SERPL-MCNC: 9 G/DL (ref 6.1–8.1)

## 2025-02-28 DIAGNOSIS — E66.01 MORBID OBESITY WITH BODY MASS INDEX (BMI) OF 40.0 TO 44.9 IN ADULT (MULTI): Primary | ICD-10-CM

## 2025-03-03 ENCOUNTER — APPOINTMENT (OUTPATIENT)
Dept: PRIMARY CARE | Facility: CLINIC | Age: 25
End: 2025-03-03
Payer: COMMERCIAL

## 2025-03-03 VITALS
TEMPERATURE: 97.8 F | DIASTOLIC BLOOD PRESSURE: 94 MMHG | BODY MASS INDEX: 45.06 KG/M2 | HEART RATE: 99 BPM | SYSTOLIC BLOOD PRESSURE: 154 MMHG | OXYGEN SATURATION: 96 % | WEIGHT: 315 LBS

## 2025-03-03 DIAGNOSIS — M79.10 MYALGIA: ICD-10-CM

## 2025-03-03 DIAGNOSIS — R79.89 ELEVATED LFTS: Primary | ICD-10-CM

## 2025-03-03 PROCEDURE — 99214 OFFICE O/P EST MOD 30 MIN: CPT | Performed by: NURSE PRACTITIONER

## 2025-03-03 PROCEDURE — 3077F SYST BP >= 140 MM HG: CPT | Performed by: NURSE PRACTITIONER

## 2025-03-03 PROCEDURE — 1036F TOBACCO NON-USER: CPT | Performed by: NURSE PRACTITIONER

## 2025-03-03 PROCEDURE — 3080F DIAST BP >= 90 MM HG: CPT | Performed by: NURSE PRACTITIONER

## 2025-03-03 ASSESSMENT — ENCOUNTER SYMPTOMS
WEAKNESS: 0
RESPIRATORY NEGATIVE: 1
PALPITATIONS: 0
FATIGUE: 0
SHORTNESS OF BREATH: 0
ACTIVITY CHANGE: 0
COUGH: 0
CHILLS: 0
DIZZINESS: 0
APNEA: 0
VOMITING: 0
NAUSEA: 0
NERVOUS/ANXIOUS: 0
ABDOMINAL PAIN: 0
CONFUSION: 0
MYALGIAS: 1
HEADACHES: 0
FEVER: 0
CONSTITUTIONAL NEGATIVE: 1

## 2025-03-03 NOTE — PROGRESS NOTES
Subjective   Patient ID: Robert Rivero is a 24 y.o. adult who presents for Elevated Hepatic Enzymes and myalgia.    Still complaining of bodyaches and hives.  Positive IgM cytomegalovirus.    Associated transaminitis.  Liver function test trending down.  Last ultrasound of liver normal.  Taken off of Truvada due to elevated liver function tests.  Patient was only using for prep.  Negative HIV screening.  Also noted to have positive CARMELA with anti-smooth muscle antibody positive as well    Hypertension: Patient has not taken blood pressure meds today.  Encouraged to do so  Typically well-controlled on losartan and amlodipine.  Also reports a stressful day at work         Review of Systems   Constitutional: Negative.  Negative for activity change, chills, fatigue and fever.   Respiratory: Negative.  Negative for apnea, cough and shortness of breath.    Cardiovascular:  Negative for chest pain and palpitations.   Gastrointestinal:  Negative for abdominal pain, nausea and vomiting.   Musculoskeletal:  Positive for myalgias.   Neurological:  Negative for dizziness, weakness and headaches.   Psychiatric/Behavioral:  Negative for confusion. The patient is not nervous/anxious.        Objective   BP (!) 154/94   Pulse 99   Temp 36.6 °C (97.8 °F) (Temporal)   Wt (!) 172 kg (380 lb)   SpO2 96%   BMI 45.06 kg/m²     Physical Exam  Vitals reviewed.   Constitutional:       Appearance: Normal appearance.   HENT:      Head: Normocephalic.   Cardiovascular:      Rate and Rhythm: Normal rate and regular rhythm.      Pulses: Normal pulses.      Heart sounds: Normal heart sounds.   Pulmonary:      Effort: Pulmonary effort is normal. No respiratory distress.      Breath sounds: Normal breath sounds. No wheezing.   Abdominal:      General: Bowel sounds are normal.   Neurological:      General: No focal deficit present.      Mental Status: Robert is alert and oriented to person, place, and time.   Psychiatric:         Mood and Affect:  Mood normal.         Behavior: Behavior normal.         Assessment/Plan   Problem List Items Addressed This Visit             ICD-10-CM    Myalgia M79.10     Continue muscle relaxer    Additional labs ordered   Has appt with ID for elevated IgM cytomegalovirus  Suspect cytomegalovirus hepatitis         Relevant Orders    Hepatic function panel    Sedimentation Rate    C-reactive protein    Creatine Kinase    Elevated LFTs - Primary R79.89     Trending down   Referral to hepatology for eval   Avoid PreP meds at this time            Relevant Orders    Referral to Hepatology    Hepatic function panel    Sedimentation Rate    C-reactive protein    Creatine Kinase

## 2025-03-03 NOTE — ASSESSMENT & PLAN NOTE
Continue muscle relaxer    Additional labs ordered   Has appt with ID for elevated IgM cytomegalovirus  Suspect cytomegalovirus hepatitis

## 2025-03-04 ENCOUNTER — PATIENT OUTREACH (OUTPATIENT)
Dept: PRIMARY CARE | Facility: CLINIC | Age: 25
End: 2025-03-04
Payer: COMMERCIAL

## 2025-03-04 NOTE — PROGRESS NOTES
"  Clinical Pharmacy Appointment    Patient ID: Dena Rivero \"Robert\" is a 24 y.o. adult who presents for Weight Loss.    Pt is here for First appointment.     Referring Provider: Princess Quinonez APRN-CNP  PCP: DEBORAH Tijerina   Last visit with PCP: 1/30/2025   Next visit with PCP: 7/3/2025    Subjective     Interval History  N/A    HPI  Weight Loss  Current Weight: 380 lb  Current BMI: 45 kg/m2    Comorbid Conditions:  HTN: Yes  Cholesterol: No  DAYTON: Yes  The ASCVD Risk score (Bin LYNN, et al., 2019) failed to calculate for the following reasons:    The 2019 ASCVD risk score is only valid for ages 40 to 79      Medication Reconciliation:  Changed: None  Added: None  Discontinued: None    Drug Interactions  No relevant drug interactions were noted.    Medication System Management  Patient's preferred pharmacy: [facility]  Adherence/Organization: No concerns   Affordability/Accessibility: No concerns      Objective   No Known Allergies  Social History     Social History Narrative    Not on file      Medication Review  Current Outpatient Medications   Medication Instructions    albuterol (Proventil HFA) 90 mcg/actuation inhaler 2 puffs, inhalation, Every 4 hours PRN    amLODIPine (Norvasc) 5 mg tablet Take 1 tablet (5 mg) by mouth once daily. Do not fill before February 5, 2025.    busPIRone (BUSPAR) 15 mg, oral, 2 times daily    chlorthalidone (HYGROTON) 25 mg, oral, Daily    fluticasone (Flonase) 50 mcg/actuation nasal spray 2 sprays, Each Nostril, Daily    losartan (COZAAR) 100 mg, oral, Daily    losartan (COZAAR) 25 mg, oral, Daily    methocarbamol (ROBAXIN) 500 mg, oral, 4 times daily PRN    sertraline (Zoloft) 50 mg tablet Take 1/2 tab daily for first 8 days then take 1 tab daily    tirzepatide (weight loss) (ZEPBOUND) 2.5 mg, subcutaneous, Every 7 days      Vitals  BP Readings from Last 2 Encounters:   03/03/25 (!) 154/94   02/25/25 142/90     BMI Readings from Last 1 Encounters:   03/03/25 45.06 " "kg/m²      Labs  A1C  No results found for: \"HGBA1C\"  BMP  Lab Results   Component Value Date    CALCIUM 8.8 02/04/2025     (L) 02/04/2025    K 3.9 02/04/2025    CO2 25 02/04/2025     02/04/2025    BUN 5 (L) 02/04/2025    CREATININE 0.73 02/04/2025    EGFR >90 02/04/2025     LFTs  Lab Results   Component Value Date    ALT 61 (H) 02/25/2025    AST 43 (H) 02/25/2025    ALKPHOS 143 (H) 02/25/2025    BILITOT 0.5 02/25/2025     FLP  Lab Results   Component Value Date    TRIG 57 12/12/2024    CHOL 177 12/12/2024    LDLF 115 04/17/2023    LDLCALC 120 (H) 12/12/2024    HDL 45.7 12/12/2024     Urine Microalbumin  No results found for: \"MICROALBCREA\"  Weight Management  Wt Readings from Last 3 Encounters:   03/03/25 (!) 172 kg (380 lb)   02/25/25 (!) 167 kg (368 lb 6.4 oz)   02/06/25 (!) 166 kg (365 lb)      There is no height or weight on file to calculate BMI.     Assessment/Plan   Problem List Items Addressed This Visit       Morbid obesity with body mass index (BMI) of 40.0 to 44.9 in adult (Multi)     The patient expressed interest in starting weight loss medication. We discussed GLP-1 options, and the patient agreed to start Zepbound. They were informed that their insurance does not cover weight loss medications; however, we can submit a prior authorization due to their sleep apnea diagnosis.      We also reviewed the risks and benefits, considering the patient's history of gastrointestinal problems. The patient acknowledged understanding and expressed willingness to proceed with the medication.    Plan    START Zepbound 2.5 mg weekly once/if prior authorization is approved          Relevant Medications    tirzepatide, weight loss, (Zepbound) 2.5 mg/0.5 mL injection    Other Relevant Orders    Referral to Clinical Pharmacy    Sleep apnea - Primary    Relevant Medications    tirzepatide, weight loss, (Zepbound) 2.5 mg/0.5 mL injection    Other Relevant Orders    Referral to Clinical Pharmacy       Clinical " Pharmacist follow-up: 3/12/25 @ 11:20 am, Telehealth visit    Continue all meds under the continuation of care with the referring provider and clinical pharmacy team.    Angella Leyva, PharmD  PGY-1 Pharmacy Resident     Verbal consent to manage patient's drug therapy was obtained from the patient. They were informed they may decline to participate or withdraw from participation in pharmacy services at any time.

## 2025-03-05 ENCOUNTER — TELEMEDICINE (OUTPATIENT)
Dept: PHARMACY | Facility: HOSPITAL | Age: 25
End: 2025-03-05
Payer: COMMERCIAL

## 2025-03-05 DIAGNOSIS — E66.01 MORBID OBESITY WITH BODY MASS INDEX (BMI) OF 40.0 TO 44.9 IN ADULT (MULTI): ICD-10-CM

## 2025-03-05 DIAGNOSIS — G47.33 OBSTRUCTIVE SLEEP APNEA SYNDROME: Primary | ICD-10-CM

## 2025-03-05 NOTE — ASSESSMENT & PLAN NOTE
The patient expressed interest in starting weight loss medication. We discussed GLP-1 options, and the patient agreed to start Zepbound. They were informed that their insurance does not cover weight loss medications; however, we can submit a prior authorization due to their sleep apnea diagnosis.      We also reviewed the risks and benefits, considering the patient's history of gastrointestinal problems. The patient acknowledged understanding and expressed willingness to proceed with the medication.    Plan    START Zepbound 2.5 mg weekly once/if prior authorization is approved

## 2025-03-06 NOTE — PROGRESS NOTES
I reviewed the progress note and agree with the resident’s findings and plans as written. Case discussed with resident.    Gail Mosquera, PharmD

## 2025-03-12 ENCOUNTER — APPOINTMENT (OUTPATIENT)
Dept: PHARMACY | Facility: HOSPITAL | Age: 25
End: 2025-03-12
Payer: COMMERCIAL

## 2025-03-18 NOTE — PROGRESS NOTES
"  Clinical Pharmacy Appointment    Patient ID: Dena Rivero \"Robert\" is a 24 y.o. adult who presents for Weight Loss.    Pt is here for Follow Up appointment.     Referring Provider: Princess Quinonez APRN-CNP  PCP: DEBORAH Tijerina   Last visit with PCP: 1/30/2025   Next visit with PCP: 7/3/2025    Subjective     Interval History  The patient expressed interest in starting weight loss medication. We discussed GLP-1 options, and the patient agreed to start Zepbound. They were informed that their insurance does not cover weight loss medications; however, we can submit a prior authorization due to their sleep apnea diagnosis.     HPI  Weight Loss  Current Weight: 380 lb  Current BMI: 45 kg/m2    Comorbid Conditions:  HTN: Yes  Cholesterol: No  DAYTON: Yes  The ASCVD Risk score (Bin LYNN, et al., 2019) failed to calculate for the following reasons:    The 2019 ASCVD risk score is only valid for ages 40 to 79    Drug Interactions  No relevant drug interactions were noted.    Medication System Management  Patient's preferred pharmacy: [facility]  Adherence/Organization: No concerns   Affordability/Accessibility: No concerns      Objective   No Known Allergies  Social History     Social History Narrative    Not on file      Medication Review  Current Outpatient Medications   Medication Instructions    albuterol (Proventil HFA) 90 mcg/actuation inhaler 2 puffs, inhalation, Every 4 hours PRN    amLODIPine (Norvasc) 5 mg tablet Take 1 tablet (5 mg) by mouth once daily. Do not fill before February 5, 2025.    busPIRone (BUSPAR) 15 mg, oral, 2 times daily    chlorthalidone (HYGROTON) 25 mg, oral, Daily    fluticasone (Flonase) 50 mcg/actuation nasal spray 2 sprays, Each Nostril, Daily    losartan (COZAAR) 100 mg, oral, Daily    losartan (COZAAR) 25 mg, oral, Daily    methocarbamol (ROBAXIN) 500 mg, oral, 4 times daily PRN    sertraline (Zoloft) 50 mg tablet Take 1/2 tab daily for first 8 days then take 1 tab daily    " "  Vitals  BP Readings from Last 2 Encounters:   03/03/25 (!) 154/94   02/25/25 142/90     BMI Readings from Last 1 Encounters:   03/03/25 45.06 kg/m²      Labs  A1C  No results found for: \"HGBA1C\"  BMP  Lab Results   Component Value Date    CALCIUM 8.8 02/04/2025     (L) 02/04/2025    K 3.9 02/04/2025    CO2 25 02/04/2025     02/04/2025    BUN 5 (L) 02/04/2025    CREATININE 0.73 02/04/2025    EGFR >90 02/04/2025     LFTs  Lab Results   Component Value Date    ALT 61 (H) 02/25/2025    AST 43 (H) 02/25/2025    ALKPHOS 143 (H) 02/25/2025    BILITOT 0.5 02/25/2025     FLP  Lab Results   Component Value Date    TRIG 57 12/12/2024    CHOL 177 12/12/2024    LDLF 115 04/17/2023    LDLCALC 120 (H) 12/12/2024    HDL 45.7 12/12/2024     Urine Microalbumin  No results found for: \"MICROALBCREA\"  Weight Management  Wt Readings from Last 3 Encounters:   03/03/25 (!) 172 kg (380 lb)   02/25/25 (!) 167 kg (368 lb 6.4 oz)   02/06/25 (!) 166 kg (365 lb)      There is no height or weight on file to calculate BMI.     Assessment/Plan   Problem List Items Addressed This Visit       Morbid obesity with body mass index (BMI) of 40.0 to 44.9 in adult (Multi)     Prior authorization for Zepbound was denied due to plan exclusion. We discussed the Zepbound savings card, which would cost $650 per month, but the patient stated they cannot afford it. All weight loss medication options have been exhausted. Advised patient to contact Clinical Pharmacist if insurance changes.          Sleep apnea     Clinical Pharmacist follow-up: as requested per patient     Continue all meds under the continuation of care with the referring provider and clinical pharmacy team.    Eitan HayD  PGY-1 Pharmacy Resident     Verbal consent to manage patient's drug therapy was obtained from the patient. They were informed they may decline to participate or withdraw from participation in pharmacy services at any time.  "

## 2025-03-19 ENCOUNTER — TELEMEDICINE (OUTPATIENT)
Dept: PHARMACY | Facility: HOSPITAL | Age: 25
End: 2025-03-19
Payer: COMMERCIAL

## 2025-03-19 DIAGNOSIS — E66.01 MORBID OBESITY WITH BODY MASS INDEX (BMI) OF 40.0 TO 44.9 IN ADULT (MULTI): ICD-10-CM

## 2025-03-19 DIAGNOSIS — G47.33 OBSTRUCTIVE SLEEP APNEA SYNDROME: ICD-10-CM

## 2025-03-19 NOTE — ASSESSMENT & PLAN NOTE
Prior authorization for Zepbound was denied due to plan exclusion. We discussed the Zepbound savings card, which would cost $650 per month, but the patient stated they cannot afford it. All weight loss medication options have been exhausted. Advised patient to contact Clinical Pharmacist if insurance changes.

## 2025-04-10 ENCOUNTER — OFFICE VISIT (OUTPATIENT)
Dept: GASTROENTEROLOGY | Facility: CLINIC | Age: 25
End: 2025-04-10
Payer: COMMERCIAL

## 2025-04-10 VITALS
WEIGHT: 315 LBS | TEMPERATURE: 98.1 F | DIASTOLIC BLOOD PRESSURE: 85 MMHG | SYSTOLIC BLOOD PRESSURE: 145 MMHG | HEART RATE: 112 BPM | BODY MASS INDEX: 37.19 KG/M2 | HEIGHT: 77 IN

## 2025-04-10 DIAGNOSIS — R21 RASH: Primary | ICD-10-CM

## 2025-04-10 DIAGNOSIS — R79.89 ELEVATED LFTS: ICD-10-CM

## 2025-04-10 PROCEDURE — 3079F DIAST BP 80-89 MM HG: CPT | Performed by: STUDENT IN AN ORGANIZED HEALTH CARE EDUCATION/TRAINING PROGRAM

## 2025-04-10 PROCEDURE — 99205 OFFICE O/P NEW HI 60 MIN: CPT | Performed by: STUDENT IN AN ORGANIZED HEALTH CARE EDUCATION/TRAINING PROGRAM

## 2025-04-10 PROCEDURE — 99215 OFFICE O/P EST HI 40 MIN: CPT | Performed by: STUDENT IN AN ORGANIZED HEALTH CARE EDUCATION/TRAINING PROGRAM

## 2025-04-10 PROCEDURE — 3077F SYST BP >= 140 MM HG: CPT | Performed by: STUDENT IN AN ORGANIZED HEALTH CARE EDUCATION/TRAINING PROGRAM

## 2025-04-10 PROCEDURE — 3008F BODY MASS INDEX DOCD: CPT | Performed by: STUDENT IN AN ORGANIZED HEALTH CARE EDUCATION/TRAINING PROGRAM

## 2025-04-10 ASSESSMENT — PAIN SCALES - GENERAL: PAINLEVEL_OUTOF10: 6

## 2025-04-10 NOTE — PROGRESS NOTES
"    [facility] HEPATOLOGY CLINIC NOTE     History of Present Illness:   Dena Rivero \"Robert\" is a 24 y.o. adult (non binary, sex assigned at birth: male, pronouns: he/they) who presents to clinic for evaluation of elevated LFTs.    On 12/2024, started complaining of sore throat, cough, fatigue, myalgias, and RUQ abdominal pain. Received a course of Azithromycin (was given prior to LFTs elevation).     Presented to [facility] ED for the same symptoms on 1/27/2025. He was discharged home on Methylprednisolone 4mg pack and Azithromycin.    Admitted to [facility] for persistent symptoms from 1/31/2024 to 2/4/2025, with initial  and . 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 Hepatitis and HIV serologies were normal. US liver was normal. Seen by GI, suspicion of drug induced liver injury vs viral infection. Taken off of Truvada (it was used for PrEP) due to elevated liver function tests. Extensive workup was done, pertinent for positive CARMELA, positive anti-smooth muscle antibody, but negative LKM antibody. Downtrending LFTs on discharge with AST 91 and .    Seen in primary care clinic on 2/6/2025, during visit reported persistent myalgias. Patient was already taking Robaxin. Received one injection of Toradol IM.    Last LFTs 2/25 were downtrending with AST 43 and ALT 61.    During today's follow up, patient is doing well. Symptoms are improved. Denies current myalgias or abdominal pain.  Denies smoking, endorses occasional alcohol use and smoking mariguana. Denies starting any new medication recently.   Denies family hx of liver disease, GI disease, or autoimmune diseases.     Endorses hair loss that started around one month ago. Also reports a skin rash with dark, non itchy and non painful macules over palms and soles. Denies oral or genital lesions.   Denies N/V, dysphagia, diarrhea/constipation, hematochezia or melena.     Review of Systems  Review of systems otherwise negative. "     PMHx/PSHx: HTN, seasonal allergies, hx of R Bell's palsy, obesity    Social History   reports that Lad has never smoked. Lad has never used smokeless tobacco. Lad reports that Lad does not currently use alcohol. Lad reports current drug use. Drug: Marijuana.     Family History: no family hx of liver disease or GI diseases  family history includes Diabetes in Lad's maternal grandmother; Hypertension in Lad's maternal grandmother.     Allergies  No Known Allergies    Medications  Current Outpatient Medications   Medication Instructions    albuterol (Proventil HFA) 90 mcg/actuation inhaler 2 puffs, inhalation, Every 4 hours PRN    amLODIPine (Norvasc) 5 mg tablet Take 1 tablet (5 mg) by mouth once daily. Do not fill before February 5, 2025.    busPIRone (BUSPAR) 15 mg, oral, 2 times daily    chlorthalidone (HYGROTON) 25 mg, oral, Daily    fluticasone (Flonase) 50 mcg/actuation nasal spray 2 sprays, Each Nostril, Daily    losartan (COZAAR) 100 mg, oral, Daily    losartan (COZAAR) 25 mg, oral, Daily    methocarbamol (ROBAXIN) 500 mg, oral, 4 times daily PRN    sertraline (Zoloft) 50 mg tablet Take 1/2 tab daily for first 8 days then take 1 tab daily        There were no vitals taken for this visit.   Physical Exam  Eyes:      General: No scleral icterus.  Pulmonary:      Effort: Pulmonary effort is normal.   Abdominal:      General: Abdomen is flat. There is no distension.      Palpations: Abdomen is soft.      Tenderness: There is no abdominal tenderness.   Musculoskeletal:         General: No swelling.   Skin:     Coloration: Skin is not jaundiced.   Neurological:      Mental Status: Robert is alert and oriented to person, place, and time.         Labs    Lab Results   Component Value Date    WBC 7.3 02/04/2025    HGB 11.8 (L) 02/04/2025    HCT 35.9 (L) 02/04/2025    MCV 82 02/04/2025     02/04/2025     Lab Results   Component Value Date    GLUCOSE 82 02/04/2025    CALCIUM 8.8 02/04/2025     (L)  02/04/2025    K 3.9 02/04/2025    CO2 25 02/04/2025     02/04/2025    BUN 5 (L) 02/04/2025    CREATININE 0.73 02/04/2025     Lab Results   Component Value Date    ALT 61 (H) 02/25/2025    AST 43 (H) 02/25/2025    ALKPHOS 143 (H) 02/25/2025    BILITOT 0.5 02/25/2025     Lab Results   Component Value Date    INR 1.2 (H) 02/03/2025    INR 1.2 (H) 02/02/2025    INR 1.2 (H) 01/31/2025    PROTIME 13.9 (H) 02/03/2025    PROTIME 14.0 (H) 02/02/2025    PROTIME 13.6 (H) 01/31/2025      Latest Reference Range & Units Most Recent   Hepatitis C AB Nonreactive  Nonreactive  1/31/25 19:06   Hepatitis B Surface AG Nonreactive  Nonreactive  1/31/25 19:06   Hepatitis B Surface AB <10.0 mIU/mL <3.1  1/31/25 19:06   Hepatitis B Core AB- Total Nonreactive  Nonreactive  1/31/25 19:06   Hepatitis A  AB- IgM Nonreactive  Nonreactive  1/31/25 19:06   Hepatitis B Core AB; IgM Nonreactive  Nonreactive  1/31/25 19:06   Hepatitis A  AB-Total Nonreactive  Reactive !  2/1/25 06:01   Cytomegalovirus IgG Nonreactive  Reactive !  2/1/25 12:19   CMV IgM <=29.9 AU/mL 150.0 (H)  2/1/25 12:19   HSV 1, IgG <0.9 INDEX 0.3  1/31/25 19:06   HSV 2, IgG <0.9 INDEX >8.0 (H)  1/31/25 19:06   HIV 1/2 Antigen/Antibody Screen with Reflex to Confirmation Nonreactive  Nonreactive  12/12/24 07:29   HIV AG/AB, 4TH GEN NON-REACTIVE  NON-REACTIVE  1/30/25 11:39   Syphilis Total Ab Nonreactive  Nonreactive  6/22/24 18:10      Latest Reference Range & Units 01/30/25 11:39 01/31/25 19:06 02/01/25 06:01 02/02/25 08:35 02/03/25 11:10 02/04/25 05:34 02/25/25 09:13   Alkaline Phosphatase 33 - 120 U/L  151 (H) 136 (H) 153 (H) 170 (H) 161 (H)    ALKALINE PHOSPHATASE 36 - 130 U/L 135 (H)      143 (H)   ALT 7 - 52 U/L  354 (H) 291 (H) 272 (H) 248 (H) 209 (H)    ALT 9 - 46 U/L 317 (H)      61 (H)   AST 9 - 39 U/L  181 (H) 135 (H) 123 (H) 106 (H) 91 (H)    AST 10 - 40 U/L 179 (H)      43 (H)   Bilirubin Total 0.0 - 1.2 mg/dL  0.6 0.5 0.8 0.8 1.0    BILIRUBIN, TOTAL 0.2 - 1.2  mg/dL 0.7      0.5   (H): Data is abnormally high     Latest Reference Range & Units 01/31/25 22:56 02/02/25 08:35 02/03/25 11:10   Protime 9.8 - 12.8 seconds 13.6 (H) 14.0 (H) 13.9 (H)   INR 0.9 - 1.1  1.2 (H) 1.2 (H) 1.2 (H)   (H): Data is abnormally high     Latest Reference Range & Units 01/31/25 19:06   CARMELA Negative  Positive !   Anti-SAJI-1 IgG <1.0 AI <0.2   Anti-Mitochondrial Antibody Negative  Negative   Anti-Smooth Muscle Antibody Negative  Positive 1:80 !   CARMELA Titer  1:160   CARMELA Pattern  Speckled   ANTI-SM TEST <1.0 AI 0.2   Anti-SCL-70 <1.0 AI 0.3   Anti-SM/RNP <1.0 AI <0.2   Anti-RNP <1.0 AI 0.8   Anti-SSA <1.0 AI <0.2   Anti-SSB <1.0 AI <0.2   ANTI-RIBOSOMAL P <1.0 AI 0.3   Anti-Centromere <1.0 AI <0.2   Anti-Chromatin <1.0 AI <0.2   Anti-DNA (DS) <5.0 IU/mL 1.0   Liver/Kidney Microsome Type 1 Antibodies <1:20  <1:20   Soluble Liver Antigen Antibody, IgG 0.0 - 24.9 U 0.9   !: Data is abnormal    Imaging:    US liver 2/2025    FINDINGS:  LIVER:  The liver measures 16.1 cm. Parenchymal echogenicity is coarse. No  focal mass is identified.          GALLBLADDER:  The gallbladder is nondistended, and demonstrates no evidence of  gallstones, wall thickening or surrounding fluid. The gallbladder  wall thickness is 2.6 mm. Sonographic Will's sign is negative.          BILE DUCTS:  No evidence of intra or extrahepatic biliary dilatation is  identified; the common bile duct measures 0.5 cm.      PANCREAS:  The pancreas is poorly visualized due to overlying bowel gas.      RIGHT KIDNEY:  The right kidney measures 13.0 cm in length. The renal cortical  echogenicity and thickness are within normal limit.  No  hydronephrosis or renal calculi are seen.      IMPRESSION:  No finding is identified to explain the patient's pain. Pancreas is  poorly visualized.    === 01/31/25 ===    CT ABDOMEN PELVIS W IV CONTRAST    - Impression -  No acute abdominal or pelvic process.    No peripancreatic inflammatory changes or fluid  "is seen. Correlate  with laboratory examinationIf there is concern for early acute  pancreatitis.    Mild bladder wall thickening may relate to under distention.  Correlate with symptomatology and urinalysis if there is clinical  concern for cystitis.    Additional findings as described above.    MACRO:  None    Signed by: Gloria Sánchez 1/31/2025 9:53 PM  Dictation workstation:   [id number]       GI procedures: none      ASSESSMENT AND PLAN:  Dena Rivero \"Robert\" is a 24 y.o. adult who presents to clinic for evaluation of elevated LFTs.     #Elevated LFTs   Elevated LFTs since end of 01/2025, currently downtrending ( >> 43 and  >> 61). Elevation in LFTs possibly due to recent CMV infection vs drug induced liver injury in the settings of Azithromycin and steroids, in this case we would observe gradual normalization of LFTs. Concerning Truvada (Tenofovir-Emtricitabine), only minor ALT and AST are observed with Tenofovir but rarely above 5 times ULN (<1%); there is little evidence for direct hepatotoxicity of Emtricitabine.  Autoimmune hepatitis remains on the differential especially in the settings of positive CARMELA and anti smooth muscle antibody. If this is the case then a transient decrease in LFTs would be expected since the patient received a course of steroids. Would consider obtaining liver biopsy for assessment of autoimmune hepatitis in case of re-increase in LFTs.    Plan:  -Obtain repeat LFTs  -Keep off Truvada for now pending repeat LFTs  -Consider obtaining liver biopsy in case of worsening LFTs    Caridad Almanzar MD  PGY-2 Internal Medicine             "

## 2025-04-10 NOTE — PATIENT INSTRUCTIONS
Thank you for trusting your care with [facility] [place].   Please get blood work done at your earliest convenience   Dr. Hicks will call you after your blood work is done to discuss follow up and a possible liver biopsy

## 2025-04-11 LAB
ALBUMIN SERPL-MCNC: 4.4 G/DL (ref 3.6–5.1)
ALP SERPL-CCNC: 79 U/L (ref 36–130)
ALT SERPL-CCNC: 19 U/L (ref 9–46)
ANION GAP SERPL CALCULATED.4IONS-SCNC: 15 MMOL/L (CALC) (ref 7–17)
AST SERPL-CCNC: 17 U/L (ref 10–40)
BASOPHILS # BLD AUTO: 39 CELLS/UL (ref 0–200)
BASOPHILS NFR BLD AUTO: 0.5 %
BILIRUB DIRECT SERPL-MCNC: 0.1 MG/DL
BILIRUB SERPL-MCNC: 0.4 MG/DL (ref 0.2–1.2)
BUN SERPL-MCNC: 11 MG/DL (ref 7–25)
CALCIUM SERPL-MCNC: 9.6 MG/DL (ref 8.6–10.3)
CHLORIDE SERPL-SCNC: 105 MMOL/L (ref 98–110)
CO2 SERPL-SCNC: 19 MMOL/L (ref 20–32)
CREAT SERPL-MCNC: 0.84 MG/DL (ref 0.6–1.24)
EGFRCR SERPLBLD CKD-EPI 2021: 125 ML/MIN/1.73M2
EOSINOPHIL # BLD AUTO: 78 CELLS/UL (ref 15–500)
EOSINOPHIL NFR BLD AUTO: 1 %
ERYTHROCYTE [DISTWIDTH] IN BLOOD BY AUTOMATED COUNT: 11.7 % (ref 11–15)
GLUCOSE SERPL-MCNC: 84 MG/DL (ref 65–99)
HCT VFR BLD AUTO: 44.7 % (ref 38.5–50)
HGB BLD-MCNC: 14.6 G/DL (ref 13.2–17.1)
INR PPP: 1.1
LYMPHOCYTES # BLD AUTO: 1755 CELLS/UL (ref 850–3900)
LYMPHOCYTES NFR BLD AUTO: 22.5 %
MCH RBC QN AUTO: 26.4 PG (ref 27–33)
MCHC RBC AUTO-ENTMCNC: 32.7 G/DL (ref 32–36)
MCV RBC AUTO: 80.7 FL (ref 80–100)
MONOCYTES # BLD AUTO: 764 CELLS/UL (ref 200–950)
MONOCYTES NFR BLD AUTO: 9.8 %
NEUTROPHILS # BLD AUTO: 5164 CELLS/UL (ref 1500–7800)
NEUTROPHILS NFR BLD AUTO: 66.2 %
PLATELET # BLD AUTO: 308 THOUSAND/UL (ref 140–400)
PMV BLD REES-ECKER: 10.3 FL (ref 7.5–12.5)
POTASSIUM SERPL-SCNC: 4.6 MMOL/L (ref 3.5–5.3)
PROT SERPL-MCNC: 9.3 G/DL (ref 6.1–8.1)
PROTHROMBIN TIME: 11.4 SEC (ref 9–11.5)
RBC # BLD AUTO: 5.54 MILLION/UL (ref 4.2–5.8)
SODIUM SERPL-SCNC: 139 MMOL/L (ref 135–146)
WBC # BLD AUTO: 7.8 THOUSAND/UL (ref 3.8–10.8)

## 2025-05-01 ENCOUNTER — PATIENT OUTREACH (OUTPATIENT)
Dept: PRIMARY CARE | Facility: CLINIC | Age: 25
End: 2025-05-01
Payer: COMMERCIAL

## 2025-05-06 ENCOUNTER — APPOINTMENT (OUTPATIENT)
Dept: INFECTIOUS DISEASES | Facility: CLINIC | Age: 25
End: 2025-05-06
Payer: COMMERCIAL

## 2025-05-12 DIAGNOSIS — J06.9 VIRAL UPPER RESPIRATORY TRACT INFECTION: Primary | ICD-10-CM

## 2025-05-12 RX ORDER — PROMETHAZINE HYDROCHLORIDE AND DEXTROMETHORPHAN HYDROBROMIDE 6.25; 15 MG/5ML; MG/5ML
5 SYRUP ORAL EVERY 4 HOURS PRN
Qty: 120 ML | Refills: 0 | Status: SHIPPED | OUTPATIENT
Start: 2025-05-12 | End: 2025-06-11

## 2025-05-18 ENCOUNTER — HOSPITAL ENCOUNTER (EMERGENCY)
Facility: HOSPITAL | Age: 25
Discharge: HOME | End: 2025-05-18
Payer: COMMERCIAL

## 2025-05-18 VITALS
RESPIRATION RATE: 16 BRPM | DIASTOLIC BLOOD PRESSURE: 76 MMHG | HEIGHT: 77 IN | SYSTOLIC BLOOD PRESSURE: 127 MMHG | BODY MASS INDEX: 37.19 KG/M2 | OXYGEN SATURATION: 98 % | HEART RATE: 63 BPM | TEMPERATURE: 98.9 F | WEIGHT: 315 LBS

## 2025-05-18 DIAGNOSIS — R11.2 NAUSEA AND VOMITING, UNSPECIFIED VOMITING TYPE: Primary | ICD-10-CM

## 2025-05-18 LAB
ALBUMIN SERPL BCP-MCNC: 4.3 G/DL (ref 3.4–5)
ALP SERPL-CCNC: 70 U/L (ref 33–120)
ALT SERPL W P-5'-P-CCNC: 20 U/L (ref 7–52)
AMPHETAMINES UR QL SCN: ABNORMAL
ANION GAP SERPL CALC-SCNC: 12 MMOL/L (ref 10–20)
APPEARANCE UR: ABNORMAL
AST SERPL W P-5'-P-CCNC: 19 U/L (ref 9–39)
BARBITURATES UR QL SCN: ABNORMAL
BASOPHILS # BLD AUTO: 0.03 X10*3/UL (ref 0–0.1)
BASOPHILS NFR BLD AUTO: 0.2 %
BENZODIAZ UR QL SCN: ABNORMAL
BILIRUB SERPL-MCNC: 0.7 MG/DL (ref 0–1.2)
BILIRUB UR STRIP.AUTO-MCNC: NEGATIVE MG/DL
BUN SERPL-MCNC: 11 MG/DL (ref 6–23)
BZE UR QL SCN: ABNORMAL
CALCIUM SERPL-MCNC: 9.4 MG/DL (ref 8.6–10.3)
CANNABINOIDS UR QL SCN: ABNORMAL
CHLORIDE SERPL-SCNC: 107 MMOL/L (ref 98–107)
CO2 SERPL-SCNC: 21 MMOL/L (ref 21–32)
COLOR UR: YELLOW
CREAT SERPL-MCNC: 0.73 MG/DL (ref 0.5–1.3)
EGFRCR SERPLBLD CKD-EPI 2021: >90 ML/MIN/1.73M*2
EOSINOPHIL # BLD AUTO: 0.05 X10*3/UL (ref 0–0.7)
EOSINOPHIL NFR BLD AUTO: 0.3 %
ERYTHROCYTE [DISTWIDTH] IN BLOOD BY AUTOMATED COUNT: 12.3 % (ref 11.5–14.5)
FENTANYL+NORFENTANYL UR QL SCN: ABNORMAL
GLUCOSE SERPL-MCNC: 109 MG/DL (ref 74–99)
GLUCOSE UR STRIP.AUTO-MCNC: NORMAL MG/DL
HCT VFR BLD AUTO: 45.2 % (ref 36–52)
HGB BLD-MCNC: 14.8 G/DL (ref 12–17.5)
HOLD SPECIMEN: NORMAL
IMM GRANULOCYTES # BLD AUTO: 0.05 X10*3/UL (ref 0–0.7)
IMM GRANULOCYTES NFR BLD AUTO: 0.3 % (ref 0–0.9)
KETONES UR STRIP.AUTO-MCNC: ABNORMAL MG/DL
LACTATE SERPL-SCNC: 1.1 MMOL/L (ref 0.4–2)
LEUKOCYTE ESTERASE UR QL STRIP.AUTO: NEGATIVE
LIPASE SERPL-CCNC: 17 U/L (ref 9–82)
LYMPHOCYTES # BLD AUTO: 0.55 X10*3/UL (ref 1.2–4.8)
LYMPHOCYTES NFR BLD AUTO: 3.8 %
MCH RBC QN AUTO: 26.1 PG (ref 26–34)
MCHC RBC AUTO-ENTMCNC: 32.7 G/DL (ref 32–36)
MCV RBC AUTO: 80 FL (ref 80–100)
METHADONE UR QL SCN: ABNORMAL
MONOCYTES # BLD AUTO: 0.9 X10*3/UL (ref 0.1–1)
MONOCYTES NFR BLD AUTO: 6.3 %
NEUTROPHILS # BLD AUTO: 12.81 X10*3/UL (ref 1.2–7.7)
NEUTROPHILS NFR BLD AUTO: 89.1 %
NITRITE UR QL STRIP.AUTO: NEGATIVE
NRBC BLD-RTO: 0 /100 WBCS (ref 0–0)
OPIATES UR QL SCN: ABNORMAL
OXYCODONE+OXYMORPHONE UR QL SCN: ABNORMAL
PCP UR QL SCN: ABNORMAL
PH UR STRIP.AUTO: 5 [PH]
PLATELET # BLD AUTO: 276 X10*3/UL (ref 150–450)
POTASSIUM SERPL-SCNC: 3.9 MMOL/L (ref 3.5–5.3)
PROT SERPL-MCNC: 9 G/DL (ref 6.4–8.2)
PROT UR STRIP.AUTO-MCNC: NEGATIVE MG/DL
RBC # BLD AUTO: 5.68 X10*6/UL (ref 4–5.9)
RBC # UR STRIP.AUTO: NEGATIVE MG/DL
SODIUM SERPL-SCNC: 136 MMOL/L (ref 136–145)
SP GR UR STRIP.AUTO: 1.03
UROBILINOGEN UR STRIP.AUTO-MCNC: NORMAL MG/DL
WBC # BLD AUTO: 14.4 X10*3/UL (ref 4.4–11.3)

## 2025-05-18 PROCEDURE — 96361 HYDRATE IV INFUSION ADD-ON: CPT

## 2025-05-18 PROCEDURE — 99284 EMERGENCY DEPT VISIT MOD MDM: CPT

## 2025-05-18 PROCEDURE — 83690 ASSAY OF LIPASE: CPT | Performed by: PHYSICIAN ASSISTANT

## 2025-05-18 PROCEDURE — 80307 DRUG TEST PRSMV CHEM ANLYZR: CPT | Performed by: PHYSICIAN ASSISTANT

## 2025-05-18 PROCEDURE — 96374 THER/PROPH/DIAG INJ IV PUSH: CPT

## 2025-05-18 PROCEDURE — 83605 ASSAY OF LACTIC ACID: CPT | Performed by: PHYSICIAN ASSISTANT

## 2025-05-18 PROCEDURE — 81003 URINALYSIS AUTO W/O SCOPE: CPT | Performed by: PHYSICIAN ASSISTANT

## 2025-05-18 PROCEDURE — 85025 COMPLETE CBC W/AUTO DIFF WBC: CPT | Performed by: PHYSICIAN ASSISTANT

## 2025-05-18 PROCEDURE — 80053 COMPREHEN METABOLIC PANEL: CPT | Performed by: PHYSICIAN ASSISTANT

## 2025-05-18 PROCEDURE — 36415 COLL VENOUS BLD VENIPUNCTURE: CPT | Performed by: PHYSICIAN ASSISTANT

## 2025-05-18 PROCEDURE — 2500000004 HC RX 250 GENERAL PHARMACY W/ HCPCS (ALT 636 FOR OP/ED): Mod: JZ | Performed by: PHYSICIAN ASSISTANT

## 2025-05-18 RX ORDER — ONDANSETRON HYDROCHLORIDE 2 MG/ML
4 INJECTION, SOLUTION INTRAVENOUS ONCE
Status: COMPLETED | OUTPATIENT
Start: 2025-05-18 | End: 2025-05-18

## 2025-05-18 RX ORDER — ONDANSETRON 4 MG/1
4 TABLET, FILM COATED ORAL EVERY 6 HOURS
Qty: 12 TABLET | Refills: 0 | Status: SHIPPED | OUTPATIENT
Start: 2025-05-18 | End: 2025-05-21

## 2025-05-18 RX ADMIN — SODIUM CHLORIDE 1000 ML: 0.9 INJECTION, SOLUTION INTRAVENOUS at 07:33

## 2025-05-18 RX ADMIN — ONDANSETRON 4 MG: 2 INJECTION, SOLUTION INTRAMUSCULAR; INTRAVENOUS at 07:38

## 2025-05-18 ASSESSMENT — PAIN DESCRIPTION - LOCATION: LOCATION: ABDOMEN

## 2025-05-18 ASSESSMENT — PAIN SCALES - GENERAL: PAINLEVEL_OUTOF10: 7

## 2025-05-18 ASSESSMENT — COLUMBIA-SUICIDE SEVERITY RATING SCALE - C-SSRS
2. HAVE YOU ACTUALLY HAD ANY THOUGHTS OF KILLING YOURSELF?: NO
6. HAVE YOU EVER DONE ANYTHING, STARTED TO DO ANYTHING, OR PREPARED TO DO ANYTHING TO END YOUR LIFE?: NO
1. IN THE PAST MONTH, HAVE YOU WISHED YOU WERE DEAD OR WISHED YOU COULD GO TO SLEEP AND NOT WAKE UP?: NO

## 2025-05-18 ASSESSMENT — PAIN - FUNCTIONAL ASSESSMENT: PAIN_FUNCTIONAL_ASSESSMENT: 0-10

## 2025-05-18 NOTE — ED PROVIDER NOTES
EMERGENCY MEDICINE EVALUATION NOTE    History of Present Illness     Chief Complaint:   Chief Complaint   Patient presents with    Vomiting     Onset this morning around. Felt fine when going to bed. Had recently been proscribed     Flu Symptoms     Cough x 1 week. Got recent Rx for cough med from PCP       HPI: Dena Rivero is a 24 y.o. adult presents with a chief complaint of nausea and vomiting.  Patient reports that his symptoms started this morning.  He states that it was around 3 AM when he woke up and started having vomiting.  Patient states that he has a little bit of abdominal cramping associated with this.  Patient denies any fevers or chills.  He does note that he has had cough for a little over a week.  Patient reports that he spoke to his primary care provider about this and he prescribed him some cough medicine which helped.  He states that he does have a history of some elevated LFTs earlier this year which have normalized.  Patient follows with Dr. Leija.  Patient states that the pain is diffuse throughout his abdomen and occasionally in his upper area that is primarily after he vomits.  Patient reports that he tried to take Tylenol this morning for his pain but it came back up.  Patient denies any urinary symptoms such as dysuria or frequency.  Patient states that he does smoke marijuana frequently but not quite every day.    Previous History   Medical History[1]  Surgical History[2]  Social History[3]  Family History[4]  Allergies[5]  Current Outpatient Medications   Medication Instructions    albuterol (Proventil HFA) 90 mcg/actuation inhaler 2 puffs, inhalation, Every 4 hours PRN    amLODIPine (Norvasc) 5 mg tablet Take 1 tablet (5 mg) by mouth once daily. Do not fill before February 5, 2025.    busPIRone (BUSPAR) 15 mg, oral, 2 times daily    chlorthalidone (HYGROTON) 25 mg, oral, Daily    fluticasone (Flonase) 50 mcg/actuation nasal spray 2 sprays, Each Nostril, Daily    losartan (COZAAR)  100 mg, oral, Daily    losartan (COZAAR) 25 mg, oral, Daily    methocarbamol (ROBAXIN) 500 mg, oral, 4 times daily PRN    ondansetron (ZOFRAN) 4 mg, oral, Every 6 hours    promethazine-DM (Phenergan-DM) 6.25-15 mg/5 mL syrup 5 mL, oral, Every 4 hours PRN    sertraline (Zoloft) 50 mg tablet Take 1/2 tab daily for first 8 days then take 1 tab daily       Physical Exam     Appearance: Alert, oriented , cooperative,  in no acute distress.  No vomiting noted on examination.     Skin: Intact,  dry skin, no lesions, rash, petechiae or purpura.      Eyes: PERRLA, EOMs intact,  Conjunctiva pink      ENT: Hearing grossly intact. Pharynx clear     Neck: Supple. Trachea at midline.      Pulmonary: Clear bilaterally. No rales, rhonchi or wheezing. No accessory muscle use or stridor.     Cardiac: Normal rate and rhythm without murmur     Abdomen: Soft, nontender, active bowel sounds.  Benign abdominal exam.     Musculoskeletal: Full range of motion.      Neurological:Cranial nerves II through XII are grossly intact, normal sensation, no weakness, no focal findings identified.     Results     Labs Reviewed   CBC WITH AUTO DIFFERENTIAL - Abnormal       Result Value    WBC 14.4 (*)     nRBC 0.0      RBC 5.68      Hemoglobin 14.8      Hematocrit 45.2      MCV 80      MCH 26.1      MCHC 32.7      RDW 12.3      Platelets 276      Neutrophils % 89.1      Immature Granulocytes %, Automated 0.3      Lymphocytes % 3.8      Monocytes % 6.3      Eosinophils % 0.3      Basophils % 0.2      Neutrophils Absolute 12.81 (*)     Immature Granulocytes Absolute, Automated 0.05      Lymphocytes Absolute 0.55 (*)     Monocytes Absolute 0.90      Eosinophils Absolute 0.05      Basophils Absolute 0.03     COMPREHENSIVE METABOLIC PANEL - Abnormal    Glucose 109 (*)     Sodium 136      Potassium 3.9      Chloride 107      Bicarbonate 21      Anion Gap 12      Urea Nitrogen 11      Creatinine 0.73      eGFR >90      Calcium 9.4      Albumin 4.3       Alkaline Phosphatase 70      Total Protein 9.0 (*)     AST 19      Bilirubin, Total 0.7      ALT 20     URINALYSIS WITH REFLEX CULTURE AND MICROSCOPIC - Abnormal    Color, Urine Yellow      Appearance, Urine Ex.Turbid (*)     Specific Gravity, Urine 1.028      pH, Urine 5.0      Protein, Urine NEGATIVE      Glucose, Urine Normal      Blood, Urine NEGATIVE      Ketones, Urine TRACE (*)     Bilirubin, Urine NEGATIVE      Urobilinogen, Urine Normal      Nitrite, Urine NEGATIVE      Leukocyte Esterase, Urine NEGATIVE     LIPASE - Normal    Lipase 17      Narrative:     Venipuncture immediately after or during the administration of Metamizole may lead to falsely low results. Testing should be performed immediately prior to Metamizole dosing.   LACTATE - Normal    Lactate 1.1      Narrative:     Venipuncture immediately after or during the administration of Metamizole may lead to falsely low results. Testing should be performed immediately prior to Metamizole dosing.   URINALYSIS WITH REFLEX CULTURE AND MICROSCOPIC    Narrative:     The following orders were created for panel order Urinalysis with Reflex Culture and Microscopic.  Procedure                               Abnormality         Status                     ---------                               -----------         ------                     Urinalysis with Reflex C...[[id number]]  Abnormal            Final result               Extra Urine Gray Tube[[id number]]                            In process                   Please view results for these tests on the individual orders.   DRUG SCREEN,URINE   EXTRA URINE GRAY TUBE     No orders to display         ED Course & Medical Decision Making     Medications   sodium chloride 0.9 % bolus 1,000 mL (1,000 mL intravenous New Bag 5/18/25 0789)   ondansetron (Zofran) injection 4 mg (4 mg intravenous Given 5/18/25 0786)     Heart Rate:  []   Temperature:  [37.2 °C (98.9 °F)]   Respirations:  [16]   BP: (127-155)/(76-93)  "  Height:  [195.6 cm (6' 5\")]   Weight:  [159 kg (350 lb)]   Pulse Ox:  [98 %]    ED Course as of 05/18/25 0920   Sun May 18, 2025   0820 Reevaluated the patient at this time.  Discussed plan of care going forward.  Patient is at this time his nausea feels better but he still is a limit of abdominal cramping pain.  Once again patient's abdominal exam was benign.  Patient has a plan of care going forward.  Patient will try p.o. challenge at this time.  If he is able to tolerate p.o. nothing further will be done.  If patient cannot tolerate p.o. we will add another antinausea agent and do imaging of the abdomen.  Patient is agreeable to the plan of care at this time.  I informed him we are currently waiting for his urinalysis once we have the results of this we can potentially discuss disposition. [CJ]   0835 Patient vitals have responded appropriately to fluids at this time with heart rate 63 bpm. [CJ]   0915 Reevaluated patient further time.  Patient's urinalysis did not show an acute abnormality.  3rd abdominal examination still shows benign abdomen.  Patient was able to tolerate p.o. here in the emergency department.  We discussed plan of care going forward.  Patient will be discharged at this time to follow-up with primary care provider in 1 to 2 days or return to the emergency department immediately with any worsening symptoms.  Patient will have nausea medication sent over to his pharmacy.  Patient is agreeable with the plan of care of discharge at this time.  Once again he was encouraged to return here with any worsening symptoms. [CJ]      ED Course User Index  [CJ] Hong Jim PA-C         Diagnoses as of 05/18/25 0920   Nausea and vomiting, unspecified vomiting type       Procedures   Procedures    Diagnosis     1. Nausea and vomiting, unspecified vomiting type        Disposition   Discharged    ED Prescriptions       Medication Sig Dispense Start Date End Date Auth. Provider    ondansetron (Zofran) 4 mg " tablet Take 1 tablet (4 mg) by mouth every 6 hours for 3 days. 12 tablet 5/18/2025 5/21/2025 Hong Jim PA-C            Disclaimer: This note was dictated by speech recognition. Minor errors in transcription may be present. Please call if questions.         [1]   Past Medical History:  Diagnosis Date    Allergic     Anemia     Headache     Hypertension     Mild anemia 04/14/2023    Personal history of diseases of the blood and blood-forming organs and certain disorders involving the immune mechanism     History of sickle cell trait    Personal history of diseases of the skin and subcutaneous tissue 03/07/2017    History of keloid of skin    Personal history of other diseases of urinary system 08/22/2016    History of hematuria    Tonsillitis 12/07/2023   [2] No past surgical history on file.  [3]   Social History  Tobacco Use    Smoking status: Never    Smokeless tobacco: Never   Vaping Use    Vaping status: Some Days    Substances: CBD    Devices: Disposable   Substance Use Topics    Alcohol use: Not Currently     Comment: Socially.    Drug use: Yes     Types: Marijuana   [4]   Family History  Problem Relation Name Age of Onset    Hypertension Maternal Grandmother Malaika Rivero     Diabetes Maternal Grandmother Malaika Rivero    [5] No Known Allergies       Hong Jim PA-C  05/18/25 0968

## 2025-07-02 DIAGNOSIS — R74.01 TRANSAMINITIS: ICD-10-CM

## 2025-07-03 ENCOUNTER — APPOINTMENT (OUTPATIENT)
Dept: PRIMARY CARE | Facility: CLINIC | Age: 25
End: 2025-07-03
Payer: COMMERCIAL

## 2025-07-18 ENCOUNTER — APPOINTMENT (OUTPATIENT)
Dept: PRIMARY CARE | Facility: CLINIC | Age: 25
End: 2025-07-18
Payer: COMMERCIAL

## 2025-07-31 ENCOUNTER — APPOINTMENT (OUTPATIENT)
Dept: PRIMARY CARE | Facility: CLINIC | Age: 25
End: 2025-07-31
Payer: COMMERCIAL

## 2025-11-04 ENCOUNTER — APPOINTMENT (OUTPATIENT)
Dept: DERMATOLOGY | Facility: CLINIC | Age: 25
End: 2025-11-04
Payer: COMMERCIAL